# Patient Record
Sex: MALE | Race: WHITE | NOT HISPANIC OR LATINO | Employment: FULL TIME | URBAN - METROPOLITAN AREA
[De-identification: names, ages, dates, MRNs, and addresses within clinical notes are randomized per-mention and may not be internally consistent; named-entity substitution may affect disease eponyms.]

---

## 2017-01-03 ENCOUNTER — PATIENT MESSAGE (OUTPATIENT)
Dept: NEUROLOGY | Facility: CLINIC | Age: 42
End: 2017-01-03

## 2017-01-03 ENCOUNTER — TELEPHONE (OUTPATIENT)
Dept: NEUROLOGY | Facility: CLINIC | Age: 42
End: 2017-01-03

## 2017-01-03 NOTE — TELEPHONE ENCOUNTER
Dory sent the following message:    Dr. Hernandez,     I have recently noticed that Adderall has not been carrying as far as is used to. I was curious to get your feelings on possibly prescribing Vyvanse and increasing the dose of Sildenafil. Also, I believe that the Specialty Pharmacy was supposed to send a new PA for my Copaxone. Has it been received?

## 2017-01-03 NOTE — TELEPHONE ENCOUNTER
Dory sent the following message in response to questions:    Usually in the mid to late evening, not to mention that I've noticed my focus has not been quite as good as it has been. I usually do feel like I'm getting enough rest. I have not tried anything else, in the past, as I usually do not take other medications, besides what Dr. Hernandez has prescribed. I have insurance through [employer] and the specialty pharmacy usually sends my copaxone through the mail.     Thanks,     Eladio

## 2017-01-04 ENCOUNTER — PATIENT MESSAGE (OUTPATIENT)
Dept: NEUROLOGY | Facility: CLINIC | Age: 42
End: 2017-01-04

## 2017-01-04 DIAGNOSIS — N52.9 ERECTILE DYSFUNCTION, UNSPECIFIED ERECTILE DYSFUNCTION TYPE: Primary | ICD-10-CM

## 2017-01-04 RX ORDER — SILDENAFIL 100 MG/1
100 TABLET, FILM COATED ORAL DAILY PRN
Qty: 15 TABLET | Refills: 3 | Status: SHIPPED | OUTPATIENT
Start: 2017-01-04 | End: 2017-09-08 | Stop reason: SDUPTHER

## 2017-01-04 NOTE — TELEPHONE ENCOUNTER
Sent 2nd [employer] message requesting patient update his insurance with [employer] so Copaxone PA can be started.

## 2017-01-10 ENCOUNTER — PATIENT MESSAGE (OUTPATIENT)
Dept: NEUROLOGY | Facility: CLINIC | Age: 42
End: 2017-01-10

## 2017-01-10 ENCOUNTER — TELEPHONE (OUTPATIENT)
Dept: PHARMACY | Facility: CLINIC | Age: 42
End: 2017-01-10

## 2017-01-12 ENCOUNTER — TELEPHONE (OUTPATIENT)
Dept: PHARMACY | Facility: CLINIC | Age: 42
End: 2017-01-12

## 2017-01-13 ENCOUNTER — DOCUMENTATION ONLY (OUTPATIENT)
Dept: NEUROLOGY | Facility: CLINIC | Age: 42
End: 2017-01-13

## 2017-01-16 ENCOUNTER — PATIENT MESSAGE (OUTPATIENT)
Dept: NEUROLOGY | Facility: CLINIC | Age: 42
End: 2017-01-16

## 2017-01-16 ENCOUNTER — DOCUMENTATION ONLY (OUTPATIENT)
Dept: NEUROLOGY | Facility: CLINIC | Age: 42
End: 2017-01-16

## 2017-01-18 ENCOUNTER — TELEPHONE (OUTPATIENT)
Dept: PHARMACY | Facility: CLINIC | Age: 42
End: 2017-01-18

## 2017-01-19 ENCOUNTER — TELEPHONE (OUTPATIENT)
Dept: PHARMACY | Facility: CLINIC | Age: 42
End: 2017-01-19

## 2017-01-19 NOTE — TELEPHONE ENCOUNTER
patient called back in response to voicemail and setup shipment.he verifed he has not started any new medication.he has not developed any new drug allergies or medical conditions. he schedule his ship date for 1-23-17 for a 1-24-17 delivery. he verified his address on file is correct and would like his medication to go there.He did miss one dose about two weeks ago so i transferred the call to a  clinical pharmacist. he verified understanding of the refill process and has no additional questions at the time_ 1-19-17.

## 2017-02-15 ENCOUNTER — TELEPHONE (OUTPATIENT)
Dept: PHARMACY | Facility: CLINIC | Age: 42
End: 2017-02-15

## 2017-03-15 ENCOUNTER — TELEPHONE (OUTPATIENT)
Dept: PHARMACY | Facility: CLINIC | Age: 42
End: 2017-03-15

## 2017-03-16 DIAGNOSIS — G35 MULTIPLE SCLEROSIS: Primary | ICD-10-CM

## 2017-03-16 RX ORDER — GLATIRAMER 40 MG/ML
INJECTION, SOLUTION SUBCUTANEOUS
Qty: 36 ML | Refills: 1 | Status: SHIPPED | OUTPATIENT
Start: 2017-03-16 | End: 2017-06-27 | Stop reason: SDUPTHER

## 2017-03-17 ENCOUNTER — TELEPHONE (OUTPATIENT)
Dept: PHARMACY | Facility: CLINIC | Age: 42
End: 2017-03-17

## 2017-03-23 ENCOUNTER — OFFICE VISIT (OUTPATIENT)
Dept: NEUROLOGY | Facility: CLINIC | Age: 42
End: 2017-03-23
Payer: COMMERCIAL

## 2017-03-23 ENCOUNTER — DOCUMENTATION ONLY (OUTPATIENT)
Dept: NEUROLOGY | Facility: CLINIC | Age: 42
End: 2017-03-23

## 2017-03-23 VITALS
HEIGHT: 70 IN | HEART RATE: 84 BPM | BODY MASS INDEX: 35.65 KG/M2 | DIASTOLIC BLOOD PRESSURE: 85 MMHG | WEIGHT: 249 LBS | SYSTOLIC BLOOD PRESSURE: 134 MMHG

## 2017-03-23 DIAGNOSIS — Z71.89 COUNSELING REGARDING GOALS OF CARE: ICD-10-CM

## 2017-03-23 DIAGNOSIS — R53.83 FATIGUE, UNSPECIFIED TYPE: ICD-10-CM

## 2017-03-23 DIAGNOSIS — G35 MULTIPLE SCLEROSIS: Primary | ICD-10-CM

## 2017-03-23 DIAGNOSIS — F32.A DEPRESSION, UNSPECIFIED DEPRESSION TYPE: ICD-10-CM

## 2017-03-23 DIAGNOSIS — Z29.89 PROPHYLACTIC IMMUNOTHERAPY: ICD-10-CM

## 2017-03-23 DIAGNOSIS — R41.840 POOR CONCENTRATION: ICD-10-CM

## 2017-03-23 PROCEDURE — 99215 OFFICE O/P EST HI 40 MIN: CPT | Mod: S$GLB,,, | Performed by: CLINICAL NURSE SPECIALIST

## 2017-03-23 PROCEDURE — 99999 PR PBB SHADOW E&M-EST. PATIENT-LVL III: CPT | Mod: PBBFAC,,, | Performed by: CLINICAL NURSE SPECIALIST

## 2017-03-23 RX ORDER — MODAFINIL 100 MG/1
TABLET ORAL
Qty: 60 TABLET | Refills: 5 | Status: SHIPPED | OUTPATIENT
Start: 2017-03-23 | End: 2019-03-27

## 2017-03-23 NOTE — Clinical Note
Xenia, I am referring Dory to you for counseling. He lost his father in June and is having a really hard time dealing with everything. His wife has encouraged him to get some help, and he is open to it. 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

## 2017-03-23 NOTE — PROGRESS NOTES
"Subjective:       Patient ID: Dory Raza is a 41 y.o. male who presents today for a routine clinic visit for MS. He was last seen in September 2016. The history has been provided by the patient.     MS HPI:  · DMT: Copaxone 40mg three times a week  · Side effects from DMT? No  · Taking vitamin D3 as recommended? Yes -  Dose: 6000 units daily  · He denies any new clinical symptoms.   · He has had some life stressors with his father passing in June 2016.   · He has L'hermitte's sign some days. When he has it, it is more prominent in the morning and tapers off throughout the day.     SOCIAL HISTORY  Social History   Substance Use Topics    Smoking status: Former Smoker     Types: Cigarettes     Quit date: 2/18/2010    Smokeless tobacco: Never Used    Alcohol use 0.0 oz/week     0 Standard drinks or equivalent per week      Comment: socially     Living arrangements - the patient lives with his wife and 3 daughters, ages 16, 11, and 10.   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     MS ROS:  · Fatigue: Yes - He has tried Co-Q-10. He still feels like his focus is poor. He gets tired around 8pm at night.   · Sleep Disturbance: No  · Bladder Dysfunction: No  · Bowel Dysfunction: No  · Spasticity: No  · Visual Symptoms: Yes - He has a floater in his field of vision. It is small, crescent-shaped. He only sees it when he looks up and to the left. He sometimes has some focus issues when he blinks.   · Cognitive: Yes - He has to keep to do lists and alarms on his phone for reminders. He forgot his daughter at the bus stop recently.   · Mood Disorder: Yes - He has had a tough time handling the death of his father.   · Gait Disturbance: No. Very infrequently, he has to "check his steps."   · Falls: No  · Hand Dysfunction: No  · Pain: No  · Sexual Dysfunction: Viagra is helpful.   · Skin Breakdown: No  · Tremors: No  · Dysphagia:  No  · Dysarthria:  No  · Heat sensitivity:  Yes - He notices more sensitivity to " hot water when he runs his hands under water.   · Any un-met adaptive needs? No  · Copay Assist?  Yes - $0 for Copaxone        Objective:        1. 25 foot timed walk: 3.09 seconds today without assist     Neurologic Exam     Mental Status   Oriented to person, place, and time.   Attention: normal. Concentration: normal.   Speech: speech is normal   Level of consciousness: alert  Knowledge: good.   Normal comprehension.     Cranial Nerves     CN III, IV, VI   Pupils are equal, round, and reactive to light.  Extraocular motions are normal.   Right pupil: Shape: regular. Reactivity: brisk.   Left pupil: Shape: regular. Reactivity: brisk.   CN III: no CN III palsy  CN VI: no CN VI palsy  Nystagmus: none     CN V   Right facial sensation deficit: none  Left facial sensation deficit: none    CN VII   Right facial weakness: none  Left facial weakness: none    CN VIII   Hearing: intact    CN IX, X   Palate: symmetric    CN XI   Right sternocleidomastoid strength: normal  Left sternocleidomastoid strength: normal  Right trapezius strength: normal  Left trapezius strength: normal    CN XII   Tongue deviation: none    Motor Exam   Muscle bulk: normal  Overall muscle tone: normal  Right arm tone: normal  Left arm tone: normal  Right arm pronator drift: absent  Left arm pronator drift: absent  Right leg tone: normal  Left leg tone: normal    Strength   Right neck flexion: 5/5  Left neck flexion: 5/5  Right neck extension: 5/5  Left neck extension: 5/5  Right deltoid: 5/5  Left deltoid: 5/5  Right biceps: 5/5  Left biceps: 5/5  Right triceps: 5/5  Left triceps: 5/5  Right wrist flexion: 5/5  Left wrist flexion: 5/5  Right wrist extension: 5/5  Left wrist extension: 5/5  Right interossei: 5/5  Left interossei: 5/5  Right iliopsoas: 5/5  Left iliopsoas: 5/5  Right quadriceps: 5/5  Left quadriceps: 5/5  Right hamstrin/5  Left hamstrin/5  Right anterior tibial: 5/5  Left anterior tibial: 5/5  Right gastroc: 5/5  Left  gastroc: 5/5    Sensory Exam   Right arm vibration: normal  Left arm vibration: normal  Right leg vibration: normal  Left leg vibration: normal    Gait, Coordination, and Reflexes     Gait  Gait: normal    Coordination   Romberg: negative  Finger to nose coordination: normal  Tandem walking coordination: normal    Tremor   Resting tremor: absent    Reflexes   Right brachioradialis: 2+  Left brachioradialis: 2+  Right biceps: 2+  Left biceps: 2+  Right patellar: 2+  Left patellar: 2+  Right achilles: 2+  Left achilles: 2+  Right plantar: normal  Left plantar: normal            Labs:     Lab Results   Component Value Date    [id number] 60 03/28/2016    [id number] 70 09/25/2015         Diagnosis/Assessment/Plan:    1. Multiple Sclerosis  · Assessment: Dory is clinically stable today on Copaxone. He is struggling with impaired focus, and he is experiencing depression since the loss of his father. I suspect that some of the focus issues are related to mood. He is open to counseling.   · Imaging: MRI brain in September 2017.   · Disease Modifying Therapies: Continue Copaxone. Patient is concerned that Copaxone may be causing weight gain, and we have seen this with other patients. We briefly discussed other DMT options, including interferons. He would like to focus on diet and exercise for now, and we will revisit again at 6 month follow-up. Continue Vitamin D. Will check level today and make recommendation on dose if needed.     2. MS Symptom Assessment / Management  · Fatigue/Cognition: Will start Provigil 100mg in the morning at 100mg at noon if needed to increase focus and concentration.   · Visual Symptoms: He has floater, which is not new.   · Mood Disorder: I have referred the patient for counseling with Xenia Miranda LCSW.   · Sexual Dysfunction: Continue Viagra.     Over 50% of this 40 minute visit was spent in direct face to face counseling of the patient about MS and the management of his symptoms. The  patient agrees with the plan of care. He will follow up with Dr. Hernandez in 6 months after brain MRI.         There are no diagnoses linked to this encounter.

## 2017-03-24 PROBLEM — G35 MULTIPLE SCLEROSIS: Status: ACTIVE | Noted: 2017-03-24

## 2017-03-24 RX ORDER — GLATIRAMER ACETATE 40 MG/ML
INJECTION, SOLUTION SUBCUTANEOUS
Qty: 36 ML | Refills: 0 | Status: SHIPPED | OUTPATIENT
Start: 2017-03-24 | End: 2017-11-09 | Stop reason: SDUPTHER

## 2017-04-04 ENCOUNTER — TELEPHONE (OUTPATIENT)
Dept: NEUROLOGY | Facility: CLINIC | Age: 42
End: 2017-04-04

## 2017-04-04 NOTE — TELEPHONE ENCOUNTER
Pt phoned back and was approved for 5 EAP sessions by Abhijit.  Certification # [id number].  I am looking for guidance on billing/documenting, etc and will then follow up with pt to schedule.

## 2017-04-04 NOTE — TELEPHONE ENCOUNTER
----- Message from RUSSEL Moreau, CNS sent at 3/24/2017  1:31 PM CDT -----  Xenia, I am referring Dory to you for counseling. He lost his father in June and is having a really hard time dealing with everything. His wife has encouraged him to get some help, and he is open to it. 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

## 2017-04-04 NOTE — TELEPHONE ENCOUNTER
Spoke with pt by phone regarding referral for counseling.  He is still interested in services.  I explained the [employer] Assistance [employer] benefit to pt, in the event he would like to take advantage of the 5 free counseling sessions provided by the plan.  Explained that I may not be covered under that plan, and so he's free to work with another therapist or call me back to schedule through his health insurance benefit.  Pt was appreciative of the information and will call [employer] regarding the EAP benefit, then follow up with this writer.

## 2017-04-10 ENCOUNTER — TELEPHONE (OUTPATIENT)
Dept: PHARMACY | Facility: CLINIC | Age: 42
End: 2017-04-10

## 2017-04-10 NOTE — TELEPHONE ENCOUNTER
Followed up with patient regarding EAP benefit and advised that he will need to see a therapist within Outpatient Psychiatry.  Pt will consider this option vs seeing this writer through his insurance.

## 2017-05-11 ENCOUNTER — TELEPHONE (OUTPATIENT)
Dept: PHARMACY | Facility: CLINIC | Age: 42
End: 2017-05-11

## 2017-06-06 ENCOUNTER — TELEPHONE (OUTPATIENT)
Dept: PHARMACY | Facility: CLINIC | Age: 42
End: 2017-06-06

## 2017-06-06 NOTE — TELEPHONE ENCOUNTER
Spoke to pt for Copaxone refill. Verified 2 patient identifiers. Reviewed general adherence. Patient has 5 injections remaining and injects every MWF. No missed doses. No new medications, allergies, health conditions or side effects reported at this time. Address confirmed. Will ship 6/12 for 6/13 delivery via [employer]. $0 Copay. Patient voiced understanding.    Mirian Hicks, Pharm.D  Specialty Pharmacy Clinical Pharmacist  [facility]  Phone: [phone]

## 2017-06-27 ENCOUNTER — OFFICE VISIT (OUTPATIENT)
Dept: OPTOMETRY | Facility: CLINIC | Age: 42
End: 2017-06-27
Payer: COMMERCIAL

## 2017-06-27 DIAGNOSIS — H10.13 ACUTE ATOPIC CONJUNCTIVITIS OF BOTH EYES: Primary | ICD-10-CM

## 2017-06-27 PROCEDURE — 92002 INTRM OPH EXAM NEW PATIENT: CPT | Mod: S$GLB,,, | Performed by: OPTOMETRIST

## 2017-06-27 PROCEDURE — 99999 PR PBB SHADOW E&M-EST. PATIENT-LVL II: CPT | Mod: PBBFAC,,, | Performed by: OPTOMETRIST

## 2017-06-27 RX ORDER — NEOMYCIN SULFATE, POLYMYXIN B SULFATE AND DEXAMETHASONE 3.5; 10000; 1 MG/ML; [USP'U]/ML; MG/ML
SUSPENSION/ DROPS OPHTHALMIC
Qty: 5 ML | Refills: 0 | Status: SHIPPED | OUTPATIENT
Start: 2017-06-27 | End: 2017-07-04

## 2017-06-27 NOTE — PROGRESS NOTES
HPI     Eye Problem    Additional comments: OS>OD itching , red,  edema chemosis FBS x Monday//           Comments   OS>OD itching , red,  edema chemosis , pt has pic, crusty, edema, FBS x   Monday//  FBS Mon does not feel now did have pain with that// no pain   now//    Agree above  Itching with fbs and conj chemosis yesterday during the day  Today still w/ redness in conj, OS >OD  No fever / sore throat, no illness, no exposure         Last edited by MIGUEL ÁNGEL Melo, OD on 6/27/2017 10:01 AM. (History)        ROS     Positive for: Eyes    Negative for: Constitutional, Gastrointestinal, Neurological, Skin,   Genitourinary, Musculoskeletal, HENT, Endocrine, Cardiovascular,   Respiratory, Psychiatric, Allergic/Imm, Heme/Lymph    Last edited by MIGUEL ÁNGEL Melo, OD on 6/27/2017 10:01 AM. (History)        Assessment /Plan     For exam results, see Encounter Report.    Acute atopic conjunctivitis of both eyes  -     neomycin-polymyxin-dexamethasone (MAXITROL) 3.5mg/mL-10,000 unit/mL-0.1 % DrpS; I gtt OU qid x 4 days, then bid x 3 days  Dispense: 5 mL; Refill: 0      ? Etiology allergic vs acute viral conj OS>OD  Rx maxitrol as directed  Add ATs tid+ for comfort  Call 48 hours if not improved or worsening, prn  Hand washing / hygiene discussed

## 2017-07-03 ENCOUNTER — TELEPHONE (OUTPATIENT)
Dept: PHARMACY | Facility: CLINIC | Age: 42
End: 2017-07-03

## 2017-07-31 ENCOUNTER — TELEPHONE (OUTPATIENT)
Dept: PHARMACY | Facility: CLINIC | Age: 42
End: 2017-07-31

## 2017-08-25 ENCOUNTER — TELEPHONE (OUTPATIENT)
Dept: PHARMACY | Facility: CLINIC | Age: 42
End: 2017-08-25

## 2017-09-01 ENCOUNTER — HOSPITAL ENCOUNTER (OUTPATIENT)
Dept: RADIOLOGY | Facility: HOSPITAL | Age: 42
Discharge: HOME | End: 2017-09-01
Attending: CLINICAL NURSE SPECIALIST
Payer: COMMERCIAL

## 2017-09-01 DIAGNOSIS — G35 MULTIPLE SCLEROSIS: ICD-10-CM

## 2017-09-01 PROCEDURE — A9585 GADOBUTROL INJECTION: HCPCS | Mod: PO | Performed by: CLINICAL NURSE SPECIALIST

## 2017-09-01 PROCEDURE — 70553 MRI BRAIN STEM W/O & W/DYE: CPT | Mod: TC,PO

## 2017-09-01 PROCEDURE — 70553 MRI BRAIN STEM W/O & W/DYE: CPT | Mod: 26,,, | Performed by: RADIOLOGY

## 2017-09-01 PROCEDURE — 25500020 PHARM REV CODE 255: Mod: PO | Performed by: CLINICAL NURSE SPECIALIST

## 2017-09-01 RX ORDER — GADOBUTROL 604.72 MG/ML
10 INJECTION INTRAVENOUS
Status: COMPLETED | OUTPATIENT
Start: 2017-09-01 | End: 2017-09-01

## 2017-09-01 RX ADMIN — GADOBUTROL 10 ML: 604.72 INJECTION INTRAVENOUS at 09:09

## 2017-09-08 ENCOUNTER — OFFICE VISIT (OUTPATIENT)
Dept: NEUROLOGY | Facility: CLINIC | Age: 42
End: 2017-09-08
Payer: COMMERCIAL

## 2017-09-08 VITALS
BODY MASS INDEX: 36.08 KG/M2 | WEIGHT: 252 LBS | HEIGHT: 70 IN | SYSTOLIC BLOOD PRESSURE: 132 MMHG | HEART RATE: 80 BPM | DIASTOLIC BLOOD PRESSURE: 83 MMHG

## 2017-09-08 DIAGNOSIS — G47.33 OSA (OBSTRUCTIVE SLEEP APNEA): Primary | ICD-10-CM

## 2017-09-08 DIAGNOSIS — Z29.89 PROPHYLACTIC IMMUNOTHERAPY: ICD-10-CM

## 2017-09-08 DIAGNOSIS — R90.89 ABNORMAL FINDING ON MRI OF BRAIN: ICD-10-CM

## 2017-09-08 DIAGNOSIS — Z71.89 COUNSELING REGARDING GOALS OF CARE: ICD-10-CM

## 2017-09-08 DIAGNOSIS — N52.9 ERECTILE DYSFUNCTION, UNSPECIFIED ERECTILE DYSFUNCTION TYPE: ICD-10-CM

## 2017-09-08 DIAGNOSIS — G35 MS (MULTIPLE SCLEROSIS): ICD-10-CM

## 2017-09-08 PROCEDURE — 99999 PR PBB SHADOW E&M-EST. PATIENT-LVL II: CPT | Mod: PBBFAC,,, | Performed by: PSYCHIATRY & NEUROLOGY

## 2017-09-08 PROCEDURE — 99215 OFFICE O/P EST HI 40 MIN: CPT | Mod: S$GLB,,, | Performed by: PSYCHIATRY & NEUROLOGY

## 2017-09-08 PROCEDURE — 3008F BODY MASS INDEX DOCD: CPT | Mod: S$GLB,,, | Performed by: PSYCHIATRY & NEUROLOGY

## 2017-09-08 RX ORDER — SILDENAFIL 100 MG/1
100 TABLET, FILM COATED ORAL DAILY PRN
Qty: 15 TABLET | Refills: 3 | Status: SHIPPED | OUTPATIENT
Start: 2017-09-08 | End: 2018-09-17 | Stop reason: SDUPTHER

## 2017-09-08 NOTE — Clinical Note
Kindly give pt a call; he's decided not to do EAP program; ready for counseling with you; would be great for next newly diagnosed group as well; thanks

## 2017-09-08 NOTE — PROGRESS NOTES
"Subjective:       Patient ID: Dory Raza is a 42 y.o. male who presents today for a routine clinic visit for MS.    MS HPI:  · DMT: Copaxone  · Side effects from DMT? No  · Taking vitamin D3 as recommended? Yes -  Dose: 6,000 IU daily  · Overall stable;   · Pt states that his wife expresses concern about his snoring; she also has concern about swallowing issues, but the patient does not feel concerned about this.  Pt states is wife says that pt is "edgy".       SOCIAL HISTORY  Social History   Substance Use Topics    Smoking status: Former Smoker     Types: Cigarettes     Quit date: 2/18/2010    Smokeless tobacco: Never Used    Alcohol use 0.0 oz/week      Comment: socially     Living arrangements - the patient live with his wife and 3 daughters.  Employment  [employer] Nurse    MS ROS:  · Fatigue: Yes - he has been very busy lately;  Has intermittent fatigue; takes Modafinil 100mg BID, and does not feel it does much;   · Sleep Disturbance: Yes - lots of snoring lately;  Admits to weight gain;   · Bladder Dysfunction: No  · Bowel Dysfunction: No  · Spasticity: No  · Visual Symptoms: No  · Cognitive: No  · Mood Disorder: Yes - he plans to see Xenia Miranda LCSW; defers any medication for mood; feels he is just overly stressed;   · Gait Disturbance: No  · Falls: No  · Hand Dysfunction: No  · Pain: No  · Sexual Dysfunction: Needs refill on Viagra  · Skin Breakdown: No  · Tremors: No  · Dysphagia:  Yes - rare  · Dysarthria:  No    Objective:    25 foot timed walk: 3.1 seconds without assist;   Neurologic Exam      MENTAL STATUS: grossly intact  CRANIAL NERVE EXAM: There is no internuclear ophthalmoplegia. Extraocular   muscles are intact.  No facial   asymmetry.There is no dysarthria.   MOTOR EXAM: Normal bulk and tone throughout UE and LE bilaterally. Rapid sequential movements are normal. Strength is 5/5 in all groups   in the lower extremities and upper extremities.   REFLEXES: Symmetric and 2+ throughout " in all 4 extremities.   SENSORY EXAM: Normal to vibration t/o  COORDINATION: Normal finger-to-nose exam.   GAIT: Narrow based and stable.      Imaging:   MRI Brain Sept 2017 stable; no new or active lesions    Labs:     Lab Results   Component Value Date    [id number] 51 03/23/2017    [id number] 60 03/28/2016    [id number] 70 09/25/2015       Diagnosis/Assessment/Plan:    1. Multiple Sclerosis  · Assessment: Pt is clinically and radiographically stable on Copaxone  · Imaging: MRI brain planned Sept 2018  · Disease Modifying Therapies:  Continue Copaxone and high dose D3    2. MS Symptom Assessment / Management  · Fatigue: continue Provigil  · Sleep Disturbance: sleep study; advised weight loss; continue Provigil;   · Sexual dysfunction: Viagra refilled  · Mood: he will see Xenia ZAMBRANOW for counseling  · No other changes to regimen described in ROS above    Over 50% of this 40 minute visit was spent in direct face to face counseling of the patient about his MS and managmement of his various sx    F/u 6 mo AP        GRICELDA (obstructive sleep apnea)  -     Polysomnogram (CPAP will be added if patient meets diagnostic criteria.); Future    Erectile dysfunction, unspecified erectile dysfunction type  -     sildenafil (VIAGRA) 100 MG tablet; Take 1 tablet (100 mg total) by mouth daily as needed for Erectile Dysfunction.  Dispense: 15 tablet; Refill: 3

## 2017-09-12 ENCOUNTER — DOCUMENTATION ONLY (OUTPATIENT)
Dept: NEUROLOGY | Facility: CLINIC | Age: 42
End: 2017-09-12

## 2017-09-14 ENCOUNTER — TELEPHONE (OUTPATIENT)
Dept: SLEEP MEDICINE | Facility: OTHER | Age: 42
End: 2017-09-14

## 2017-09-19 ENCOUNTER — TELEPHONE (OUTPATIENT)
Dept: PHARMACY | Facility: CLINIC | Age: 42
End: 2017-09-19

## 2017-10-04 ENCOUNTER — TELEPHONE (OUTPATIENT)
Dept: NEUROLOGY | Facility: CLINIC | Age: 42
End: 2017-10-04

## 2017-10-04 NOTE — TELEPHONE ENCOUNTER
Phoned pt to follow up on counseling referral and support group.  Left voicemail and sent portal message.

## 2017-10-09 ENCOUNTER — TELEPHONE (OUTPATIENT)
Dept: NEUROLOGY | Facility: CLINIC | Age: 42
End: 2017-10-09

## 2017-10-09 ENCOUNTER — PATIENT MESSAGE (OUTPATIENT)
Dept: NEUROLOGY | Facility: CLINIC | Age: 42
End: 2017-10-09

## 2017-10-09 NOTE — TELEPHONE ENCOUNTER
Followed up with pt and scheduled for consult on 10/11/17.  Discussed the newly diagnosed support group, too and added him to the wait list.

## 2017-10-11 ENCOUNTER — INITIAL CONSULT (OUTPATIENT)
Dept: NEUROLOGY | Facility: CLINIC | Age: 42
End: 2017-10-11
Payer: COMMERCIAL

## 2017-10-11 DIAGNOSIS — F43.23 PERSISTENT ADJUSTMENT DISORDER WITH MIXED ANXIETY AND DEPRESSED MOOD: Primary | ICD-10-CM

## 2017-10-11 PROCEDURE — 90791 PSYCH DIAGNOSTIC EVALUATION: CPT | Mod: S$GLB,,, | Performed by: SOCIAL WORKER
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
## 2017-10-13 ENCOUNTER — TELEPHONE (OUTPATIENT)
Dept: PHARMACY | Facility: CLINIC | Age: 42
End: 2017-10-13

## 2017-10-16 ENCOUNTER — HOSPITAL ENCOUNTER (OUTPATIENT)
Dept: SLEEP MEDICINE | Facility: OTHER | Age: 42
Discharge: HOME | End: 2017-10-16
Attending: PSYCHIATRY & NEUROLOGY
Payer: COMMERCIAL

## 2017-10-16 DIAGNOSIS — G47.33 OSA (OBSTRUCTIVE SLEEP APNEA): ICD-10-CM

## 2017-10-16 DIAGNOSIS — R06.81 CENTRAL APNEA: ICD-10-CM

## 2017-10-16 PROCEDURE — 95811 POLYSOM 6/>YRS CPAP 4/> PARM: CPT

## 2017-10-16 PROCEDURE — 95811 POLYSOM 6/>YRS CPAP 4/> PARM: CPT | Mod: 26,,, | Performed by: PSYCHIATRY & NEUROLOGY

## 2017-10-17 ENCOUNTER — OFFICE VISIT (OUTPATIENT)
Dept: NEUROLOGY | Facility: CLINIC | Age: 42
End: 2017-10-17
Payer: COMMERCIAL

## 2017-10-17 DIAGNOSIS — F43.23 PERSISTENT ADJUSTMENT DISORDER WITH MIXED ANXIETY AND DEPRESSED MOOD: Primary | ICD-10-CM

## 2017-10-17 PROCEDURE — 90837 PSYTX W PT 60 MINUTES: CPT | Mod: S$GLB,,, | Performed by: SOCIAL WORKER

## 2017-10-17 NOTE — PROGRESS NOTES
"Individual Psychotherapy (PhD/LCSW)    10/17/2017    Site:  [facility]         Therapeutic Intervention: Met with patient.  Outpatient - Insight oriented psychotherapy 60 min - CPT code 68534 and Outpatient - Supportive psychotherapy 60 min - CPT Code 21274    Chief complaint/reason for encounter: depression, interpersonal and sexual problems     Interval history and content of current session: Eladio reports he's been doing fairly well since we last met.  Completed sleep study last night and commented, "they should call it an awake study".  He's waiting for his results.  Shared that he was able to spend some time with his wife over the past week and did speak with her about couples counseling but she "wants me to focus on me right now".  Eladio shared his goal for our sessions is to "cope with this (MS) better" and communicate more effectively with his wife.  He also wants to work harder at making time for himself (fishing, exercise, etc) and his children.  He repeatedly shares that he doesn't want to regret not making the most of this time in the event that he does suffer disability in the future.    Eladio abruptly became tearful when speaking about his father's death, so we spent some time exploring his grief then returned to his goals.  Eladio identifies many barriers to achieving his goals, especially those that involve better self-care.  He shares he doesn't have time, but when we concretely explores his weekly schedule he admits that he could incorporate exercise or outings.  It seems the greater barrier for him is twofold: his sense of guilt as "I should be taking care of others first" and his concern that his wife will be angry with him even though she has encouraged his self-care.  Eladio describes a long history of feeling guilty and experiencing low self-worth though he's not sure from where this originated.      Treatment plan:  · Target symptoms: depression, adjustment, grief  · Why chosen therapy is " appropriate versus another modality: relevant to diagnosis  · Outcome monitoring methods: self-report, feedback from family, checklist/rating scale  · Therapeutic intervention type: insight oriented psychotherapy, supportive psychotherapy    Risk parameters:  Patient reports no suicidal ideation  Patient reports no homicidal ideation  Patient reports no self-injurious behavior  Patient reports no violent behavior    Verbal deficits: None    Patient's response to intervention:  The patient's response to intervention is accepting.    Progress toward goals and other mental status changes:  The patient's progress toward goals is limited.    Diagnosis:     ICD-10-CM ICD-9-CM   1. Persistent adjustment disorder with mixed anxiety and depressed mood F43.23 309.28   Rule out Major Depressive Disorder vs Persistent Depressive Disorder     Plan:  individual psychotherapy and consult psychiatrist for medication evaluation   Eladio will explore gym membership as a first step toward resuming exercise routine.    Return to clinic: 1 week    Length of Service (minutes): 60

## 2017-10-17 NOTE — PROGRESS NOTES
"All night sleep study was performed on Dory Raza on 10/16/2017.  Procedure and CPAP was explained prior to start of study; questions and concerns addressed.    EKG appeared to be NSR.  Low sat 77%.    Patient did meet criteria for PAP treatment.  Before CPAP, some snoring noted and most significant respiratory events observed during REM.  During Treatment portion of study, heated humidity, C-Flex and chin strap was used with medium Eson nasal mask.  CPAP 4 - 14 cmH2O was explored; patient appeared to tolerate mask and pressures well.  No c/o discomfort nor difficulty from patient during study.    Soon after starting PAP, pulse ox signal was lost.  The pulse ox probe was changed; the pulse ox cord and black connector on patient sensor box as well as the sensor box was changed; switched the headbox for different one; study stopped and resumed; and the computer was restarted in attempt to remedy the problem.  No success in regaining pulse ox signal so patient was placed in a different room to continue study.    Questional optimal pressure due to continued arousals and ? mouth leaks, but side REM observed on CPAP 14; supine REM not observed.  Patient reaction to PAP - "I don't like it. Its uncomfortable."    Reminder sheet was given to patient at the end of study.  "

## 2017-10-25 ENCOUNTER — OFFICE VISIT (OUTPATIENT)
Dept: NEUROLOGY | Facility: CLINIC | Age: 42
End: 2017-10-25
Payer: COMMERCIAL

## 2017-10-25 DIAGNOSIS — F43.23 PERSISTENT ADJUSTMENT DISORDER WITH MIXED ANXIETY AND DEPRESSED MOOD: Primary | ICD-10-CM

## 2017-10-25 PROCEDURE — 90837 PSYTX W PT 60 MINUTES: CPT | Mod: S$GLB,,, | Performed by: SOCIAL WORKER

## 2017-10-25 NOTE — PROGRESS NOTES
"Individual Psychotherapy (PhD/LCSW)    10/25/2017    Site:  [facility]         Therapeutic Intervention: Met with patient.  Outpatient - Insight oriented psychotherapy 60 min - CPT code 06963 and Outpatient - Supportive psychotherapy 60 min - CPT Code 73332    Chief complaint/reason for encounter: depression, interpersonal and sexual problems     Interval history and content of current session:   Eladio reports that he and his wife did complete homework of looking into gyms over the past week.  They have a few more to visit and will then make a decision.  Eladio felt a sense of accomplishment at following through with this task.  Eladio continues to report a sense of guilt when he considers "putting myself first" to engage in exercise or hobbies.  Suggested that using the language of putting oneself first may be a bit too polarizing as it suggests, to Eladio, that he is abandoning his duties as a father or .  Instead, we discussed adding exercise and hobbies as activities that would not only boost his health/well-being but would also 1) leave him feeling less stressed and more available to his family and 2) allow him to model healthy behaviors to his children.  He acknowledged that this might be a healthier way for him to think about these activities and he will continue to pursue this goal.  We focused additional session time on communication challenges that he and his wife experience.  Specifically, per Eladios' perspective, his accepting blame in order to avoid conflict and his wife's habit of holding grudges.  Provided eduction on active listening and will continue to explore these dynamics in future sessions.      Treatment plan:  · Target symptoms: depression, adjustment, grief  · Why chosen therapy is appropriate versus another modality: relevant to diagnosis  · Outcome monitoring methods: self-report, feedback from family, checklist/rating scale  · Therapeutic intervention type: insight oriented " psychotherapy, supportive psychotherapy    Risk parameters:  Patient reports no suicidal ideation  Patient reports no homicidal ideation  Patient reports no self-injurious behavior  Patient reports no violent behavior    Verbal deficits: None    Patient's response to intervention:  The patient's response to intervention is accepting.    Progress toward goals and other mental status changes:  The patient's progress toward goals is limited.    Diagnosis:     ICD-10-CM ICD-9-CM   1. Persistent adjustment disorder with mixed anxiety and depressed mood F43.23 309.28   Rule out Major Depressive Disorder vs Persistent Depressive Disorder     Plan:  individual psychotherapy and consult psychiatrist for medication evaluation   Eladio will choose a gym to enroll in, so that he can resume is exercise routine.      Return to clinic: 1 week    Length of Service (minutes): 60

## 2017-10-31 ENCOUNTER — PATIENT MESSAGE (OUTPATIENT)
Dept: NEUROLOGY | Facility: CLINIC | Age: 42
End: 2017-10-31

## 2017-11-09 ENCOUNTER — TELEPHONE (OUTPATIENT)
Dept: PHARMACY | Facility: CLINIC | Age: 42
End: 2017-11-09

## 2017-11-09 ENCOUNTER — OFFICE VISIT (OUTPATIENT)
Dept: NEUROLOGY | Facility: CLINIC | Age: 42
End: 2017-11-09
Payer: COMMERCIAL

## 2017-11-09 DIAGNOSIS — F43.23 PERSISTENT ADJUSTMENT DISORDER WITH MIXED ANXIETY AND DEPRESSED MOOD: Primary | ICD-10-CM

## 2017-11-09 PROCEDURE — 90837 PSYTX W PT 60 MINUTES: CPT | Mod: S$GLB,,, | Performed by: SOCIAL WORKER

## 2017-11-09 NOTE — PROGRESS NOTES
"Individual Psychotherapy (PhD/LCSW)    11/9/2017    Site:  [facility]         Therapeutic Intervention: Met with patient.  Outpatient - Insight oriented psychotherapy 60 min - CPT code 24712 and Outpatient - Supportive psychotherapy 60 min - CPT Code 67456    Chief complaint/reason for encounter: depression, anxiety, interpersonal and sexual problems     Interval history and content of current session:   Eladio presented to session reporting things have been "good".  Describes relationship with wife as "going well" right now and states "when it's good it's really good".  However, with further exploration of Eladio revealed distressing worry and guilt around his MS diagnosis and the perceived burden he has placed/will place on his family.  Eladio admits that MS does not greatly impact his life, presently, but he seems convinced that it will disable him in the future, so he worries about his family's financial stability and how they will feel about caring for him.  He also has distorted thoughts about his children's health, sharing as an example that his middle child has low vitamin D and his initial reaction was one of guilt, fear and sadness because "vitamin D plays a role in MS and if she get's MS it's because of me".  Similarly, Eladio expresses guilt over the purchase of his current home (done after his MS diagnosis) because his family may not be able to afford the home if he becomes disabled.  He was tearful during this discussion.    Provided education on the interaction between beliefs/thoughts, emotions and behaviors.  Introduced Eladio to worksheet on cognitive distortions and a thought record.  Explored some of Eladio's frequent cognitive distortions (personalization, catastrophizing, jumping to conclusions, disqualifying the positive) and used the thought record to examine the incident that occurred when he learned of his daughter's low vitamin D level.  Eladio expressed understanding of the concepts and will " continue to use the chart until our next session.      Treatment plan:  · Target symptoms: depression, anxiety , adjustment  · Why chosen therapy is appropriate versus another modality: relevant to diagnosis  · Outcome monitoring methods: self-report, feedback from family, checklist/rating scale  · Therapeutic intervention type: insight oriented psychotherapy, supportive psychotherapy, cognitive therapy    Risk parameters:  Patient reports no suicidal ideation  Patient reports no homicidal ideation  Patient reports no self-injurious behavior  Patient reports no violent behavior    Verbal deficits: None    Patient's response to intervention:  The patient's response to intervention is accepting.    Progress toward goals and other mental status changes:  The patient's progress toward goals is fair .    Diagnosis:     ICD-10-CM ICD-9-CM   1. Persistent adjustment disorder with mixed anxiety and depressed mood F43.23 309.28     Plan:  individual psychotherapy and consult psychiatrist for medication evaluation (considering referral)  Eladio will choose a gym to enroll in, so that he can resume his exercise routine.    Eladio will use thought record to track/examine cognitive distortions.      Return to clinic: 1 week    Length of Service (minutes): 60

## 2017-11-10 RX ORDER — GLATIRAMER ACETATE 40 MG/ML
INJECTION, SOLUTION SUBCUTANEOUS
Qty: 36 ML | Refills: 0 | Status: SHIPPED | OUTPATIENT
Start: 2017-11-10 | End: 2018-01-30 | Stop reason: SDUPTHER

## 2017-11-15 ENCOUNTER — PATIENT MESSAGE (OUTPATIENT)
Dept: NEUROLOGY | Facility: CLINIC | Age: 42
End: 2017-11-15

## 2017-11-20 ENCOUNTER — PATIENT MESSAGE (OUTPATIENT)
Dept: NEUROLOGY | Facility: CLINIC | Age: 42
End: 2017-11-20

## 2017-11-20 DIAGNOSIS — G47.33 OSA (OBSTRUCTIVE SLEEP APNEA): Primary | ICD-10-CM

## 2017-11-21 NOTE — TELEPHONE ENCOUNTER
Spoke to patient and informed him that sleep study shows GRICELDA.   Pt states that he questions validity of the study due to lots of technical difficulties.  He's reluctant to go for CPAP titration, but open to referral to sleep medicine clinic.  Will order/schedule.

## 2017-11-30 ENCOUNTER — PATIENT MESSAGE (OUTPATIENT)
Dept: NEUROLOGY | Facility: CLINIC | Age: 42
End: 2017-11-30

## 2017-12-05 ENCOUNTER — TELEPHONE (OUTPATIENT)
Dept: PHARMACY | Facility: CLINIC | Age: 42
End: 2017-12-05

## 2017-12-06 ENCOUNTER — OFFICE VISIT (OUTPATIENT)
Dept: NEUROLOGY | Facility: CLINIC | Age: 42
End: 2017-12-06
Payer: COMMERCIAL

## 2017-12-06 DIAGNOSIS — F43.23 PERSISTENT ADJUSTMENT DISORDER WITH MIXED ANXIETY AND DEPRESSED MOOD: Primary | ICD-10-CM

## 2017-12-06 PROCEDURE — 90837 PSYTX W PT 60 MINUTES: CPT | Mod: S$GLB,,, | Performed by: SOCIAL WORKER

## 2017-12-06 NOTE — PROGRESS NOTES
"Individual Psychotherapy (PhD/LCSW)    12/6/2017    Site:  [facility]         Therapeutic Intervention: Met with patient.  Outpatient - Insight oriented psychotherapy 60 min - CPT code 72258 and Outpatient - Supportive psychotherapy 60 min - CPT Code 46254    Chief complaint/reason for encounter: depression (guilt, low self-worth)    Interval history and content of current session:   Eladio arrived to session casually dressed.  He states things at home continue to be well and he and his wife and children have been getting along.  He was accepted into graduate school program at [employer] and has a promotion meeting with his employer, [employer].  Eladio initially states that he hasn't had to use his thought record or list of unhelpful thoughts because "none of those thoughts have come up".  However, when asked what he'd like to focus on today he shared that he's experiencing some guilt around starting graduate school.  Explored the unhelpful thoughts that center around "being selfish" and "puting my family in further debt".  Challenged these thoughts with other facts/counter-thoughts: increased income for family once he's finished the program, he has the support of his family, gives him more career options/mobility, sets a positive example for his children re: work ethic, pursuing passions.  Eladio labels himself as analytical and so has interest in getting to the root of his low self-worth and persistent sense of guilt.  However, upon further exploration it was difficult for him to identify any significant events from childhood that contributed even though he clearly remembers experiencing these thoughts/feelings as a child.  I encouraged use of the thought record and challenging unhelpful thoughts and we will continue to explore these issues in future sessions.    Treatment plan:  · Target symptoms: depression, anxiety , adjustment  · Why chosen therapy is appropriate versus another modality: relevant to " diagnosis  · Outcome monitoring methods: self-report, checklist/rating scale  · Therapeutic intervention type: insight oriented psychotherapy, supportive psychotherapy, cognitive therapy    Risk parameters:  Patient reports no suicidal ideation  Patient reports no homicidal ideation  Patient reports no self-injurious behavior  Patient reports no violent behavior    Verbal deficits: None    Patient's response to intervention:  The patient's response to intervention is accepting.    Progress toward goals and other mental status changes:  The patient's progress toward goals is good.    Diagnosis:     ICD-10-CM ICD-9-CM   1. Persistent adjustment disorder with mixed anxiety and depressed mood F43.23 309.28        Plan:  individual psychotherapy   Eladio will choose a gym to enroll in, so that he can resume his exercise routine.    Eladio will use thought record to track/examine cognitive distortions.      Return to clinic: 2 weeks    Length of Service (minutes): 60

## 2017-12-19 ENCOUNTER — OFFICE VISIT (OUTPATIENT)
Dept: NEUROLOGY | Facility: CLINIC | Age: 42
End: 2017-12-19
Payer: COMMERCIAL

## 2017-12-19 DIAGNOSIS — F43.21 GRIEF: ICD-10-CM

## 2017-12-19 DIAGNOSIS — F43.23 PERSISTENT ADJUSTMENT DISORDER WITH MIXED ANXIETY AND DEPRESSED MOOD: Primary | ICD-10-CM

## 2017-12-19 PROCEDURE — 90837 PSYTX W PT 60 MINUTES: CPT | Mod: S$GLB,,, | Performed by: SOCIAL WORKER

## 2017-12-19 NOTE — PROGRESS NOTES
"Individual Psychotherapy (PhD/LCSW)    12/19/2017    Site:  [facility]         Therapeutic Intervention: Met with patient.  Outpatient - Insight oriented psychotherapy 60 min - CPT code 20339 and Outpatient - Supportive psychotherapy 60 min - CPT Code 48863    Chief complaint/reason for encounter: depression (guilt, low self-worth)    Interval history and content of current session:   Eladio arrived a few minutes early to session, casually dressed.  Denies any stressors at home.  Starts graduate school in January and is no longer experiencing the guilt that he felt last week.      Eladio arrived to session casually dressed.  He states things at home continue to be well and he and his wife and children have been getting along.  Focused today on pt's persistent sense of low self-worth and tendency to feel guilty.  He shared that he's thought about this since our last session and remains unsure of the root cause.  He shared more about his childhood, today, and through this discussion he recalled feeling that he was never good enough or worthy of his father's love and attention.  He felt this despite the fact that his father never belittled him or held him to unattainable standards, but rather because his father was emotionally guarded and because Eladio blamed himself for this distance; "it's because I was not like him".  Eladio was tearful as he talked about his father and how their relationship deepened as they both aged and as his father sought help for war-related emotional difficulties.  By the end of the conversation Eladio was able to verbalize that perhaps his low-self worth was not actually his fault but was a result of this dynamic from his childhood.  He was also able to identify times where his father clearly expressed his appreciation for and admiration of Eladio.      Treatment plan:  · Target symptoms: depression, grief  · Why chosen therapy is appropriate versus another modality: relevant to " diagnosis  · Outcome monitoring methods: self-report, checklist/rating scale  · Therapeutic intervention type: insight oriented psychotherapy, supportive psychotherapy    Risk parameters:  Patient reports no suicidal ideation  Patient reports no homicidal ideation  Patient reports no self-injurious behavior  Patient reports no violent behavior    Verbal deficits: None    Patient's response to intervention:  The patient's response to intervention is accepting.    Progress toward goals and other mental status changes:  The patient's progress toward goals is good.    Diagnosis:     ICD-10-CM ICD-9-CM   1. Persistent adjustment disorder with mixed anxiety and depressed mood F43.23 309.28   2. Grief F43.20 309.0        Plan:  individual psychotherapy     Return to clinic: 2 weeks    Length of Service (minutes): 60

## 2018-01-02 ENCOUNTER — TELEPHONE (OUTPATIENT)
Dept: PHARMACY | Facility: CLINIC | Age: 43
End: 2018-01-02

## 2018-01-31 RX ORDER — GLATIRAMER ACETATE 40 MG/ML
INJECTION, SOLUTION SUBCUTANEOUS
Qty: 36 ML | Refills: 0 | Status: SHIPPED | OUTPATIENT
Start: 2018-01-31 | End: 2018-04-23 | Stop reason: SDUPTHER

## 2018-02-01 ENCOUNTER — TELEPHONE (OUTPATIENT)
Dept: PHARMACY | Facility: CLINIC | Age: 43
End: 2018-02-01

## 2018-02-02 ENCOUNTER — TELEPHONE (OUTPATIENT)
Dept: PHARMACY | Facility: CLINIC | Age: 43
End: 2018-02-02

## 2018-02-02 NOTE — TELEPHONE ENCOUNTER
DOCUMENTATION ONLY:  Prior authorization for Copaxone approved from 02/01/2018 to 01/31/2019    Case Id: [id number]    Co-pay: $250    Patient Assistance is already on file. Copay is $0  JLP

## 2018-02-02 NOTE — TELEPHONE ENCOUNTER
Spoke to patient for Copaxone refill. Verified 2 patient identifiers. Reviewed general adherence. Patient has 4 injections remaining and injects every MWF.    No missed doses. No new medications, allergies, health conditions or side effects reported at this time. Address confirmed. Will ship Copaxone on 2/7 for 2/8 delivery via [employer]. $0 Copay- 004. Patient voiced understanding.    Mirian Hicks, Pharm.D  Specialty Pharmacy Clinical Pharmacist  [facility]  Phone: [phone]

## 2018-02-28 ENCOUNTER — TELEPHONE (OUTPATIENT)
Dept: PHARMACY | Facility: CLINIC | Age: 43
End: 2018-02-28

## 2018-03-07 ENCOUNTER — PATIENT MESSAGE (OUTPATIENT)
Dept: NEUROLOGY | Facility: CLINIC | Age: 43
End: 2018-03-07

## 2018-03-26 ENCOUNTER — TELEPHONE (OUTPATIENT)
Dept: PHARMACY | Facility: CLINIC | Age: 43
End: 2018-03-26

## 2018-03-27 ENCOUNTER — PATIENT MESSAGE (OUTPATIENT)
Dept: NEUROLOGY | Facility: CLINIC | Age: 43
End: 2018-03-27

## 2018-04-09 ENCOUNTER — PROCEDURE VISIT (OUTPATIENT)
Dept: OPHTHALMOLOGY | Facility: CLINIC | Age: 43
End: 2018-04-09
Payer: COMMERCIAL

## 2018-04-09 VITALS — HEART RATE: 89 BPM | DIASTOLIC BLOOD PRESSURE: 89 MMHG | SYSTOLIC BLOOD PRESSURE: 146 MMHG

## 2018-04-09 DIAGNOSIS — H01.009 BLEPHARITIS, UNSPECIFIED LATERALITY, UNSPECIFIED TYPE: ICD-10-CM

## 2018-04-09 DIAGNOSIS — H57.9 LESION OF EYE: Primary | ICD-10-CM

## 2018-04-09 PROCEDURE — 92285 EXTERNAL OCULAR PHOTOGRAPHY: CPT | Mod: S$GLB,,, | Performed by: OPHTHALMOLOGY

## 2018-04-09 PROCEDURE — 11440 EXC FACE-MM B9+MARG 0.5 CM/<: CPT | Mod: S$GLB,,, | Performed by: OPHTHALMOLOGY

## 2018-04-09 PROCEDURE — 88304 TISSUE EXAM BY PATHOLOGIST: CPT | Performed by: PATHOLOGY

## 2018-04-09 PROCEDURE — 92014 COMPRE OPH EXAM EST PT 1/>: CPT | Mod: 57,S$GLB,, | Performed by: OPHTHALMOLOGY

## 2018-04-09 RX ORDER — NEOMYCIN SULFATE, POLYMYXIN B SULFATE AND DEXAMETHASONE 3.5; 10000; 1 MG/ML; [USP'U]/ML; MG/ML
1 SUSPENSION/ DROPS OPHTHALMIC
Qty: 5 ML | Refills: 0 | Status: SHIPPED | OUTPATIENT
Start: 2018-04-09 | End: 2018-04-18

## 2018-04-09 NOTE — PROGRESS NOTES
HPI     Eye Problem    Additional comments: Lesion removal OU           Comments   Mr. Hendricks is here today for a lesion removal OU He states he is doing well,   no problems or complaints. He is not using any GTTS or SUSHANT He denies any   eye pain or irritation.        Last edited by Cecy Matta, PCT on 4/9/2018  2:11 PM. (History)            Assessment /Plan     For exam results, see Encounter Report.    Lesion of eye  -     External/Slit Lamp Photography    Blepharitis, unspecified laterality, unspecified type      Patient here for evaluation of eyelid skin lesions, both eyes. No ocular history, does not recall having a dilated examination.     Skin lesions are causing irritation. Patient has family history of skin cancer, mother and grandfather.     Patient's grandfather had wet AMD, fundus examination today wnl    Recommend removal of lesions and sending for pathology.     Procedure Note    Attending: Melvi Meza  Resident: Amari Sim  Pre-op Dx: Eyelid lesions bilateral   Post-op Dx: same  Local: 2% lidocaine with epinephrine with 0.5% marcaine and 4% NaHCO3 and vitrase  Specimens: lid lesions (>15), right and left upper and lower eyelids  Complications: None  Blood Loss: minimal    The patient was prepped and draped in the usual sterile manner for ophthalmic plastic surgery.  A corneal shield was placed in the right palpebral fissure. 1 cc of local anesthesia was given to the right and left eyelids. Surgical forceps were used to elevate the lesions and sharp Gwen scissors were used to excise the lesion from its base. This was repeated multiple times on the right upper and lower eyelids (>10). The corneal shield was removed and the above steps were repeated in their entirety for the left eye. The tissue was sent to pathology.  High-temp cautery was used to obtain hemostasis. The corneal shield was removed. Maxitrol ointment was applied  to the wound. The patient tolerated the procedure well. There were no  complications.     Post-operative instructions were given to the patient.     Return PRN    I have reviewed and concur with the resident's history, physical, assessment, and plan.  I have personally interviewed and examined the patient.

## 2018-04-18 ENCOUNTER — OFFICE VISIT (OUTPATIENT)
Dept: NEUROLOGY | Facility: CLINIC | Age: 43
End: 2018-04-18
Payer: COMMERCIAL

## 2018-04-18 ENCOUNTER — LAB VISIT (OUTPATIENT)
Dept: LAB | Facility: HOSPITAL | Age: 43
End: 2018-04-18
Attending: PSYCHIATRY & NEUROLOGY
Payer: COMMERCIAL

## 2018-04-18 VITALS
HEART RATE: 94 BPM | BODY MASS INDEX: 36.22 KG/M2 | HEIGHT: 70 IN | WEIGHT: 253 LBS | DIASTOLIC BLOOD PRESSURE: 80 MMHG | SYSTOLIC BLOOD PRESSURE: 139 MMHG

## 2018-04-18 DIAGNOSIS — E55.9 VITAMIN D DEFICIENCY: ICD-10-CM

## 2018-04-18 DIAGNOSIS — G35 MS (MULTIPLE SCLEROSIS): Primary | ICD-10-CM

## 2018-04-18 DIAGNOSIS — Z29.89 PROPHYLACTIC IMMUNOTHERAPY: ICD-10-CM

## 2018-04-18 DIAGNOSIS — Z71.89 COUNSELING REGARDING GOALS OF CARE: ICD-10-CM

## 2018-04-18 DIAGNOSIS — G35 MS (MULTIPLE SCLEROSIS): ICD-10-CM

## 2018-04-18 LAB — 25(OH)D3+25(OH)D2 SERPL-MCNC: 61 NG/ML

## 2018-04-18 PROCEDURE — 99999 PR PBB SHADOW E&M-EST. PATIENT-LVL III: CPT | Mod: PBBFAC,,, | Performed by: PSYCHIATRY & NEUROLOGY

## 2018-04-18 PROCEDURE — 82306 VITAMIN D 25 HYDROXY: CPT

## 2018-04-18 PROCEDURE — 36415 COLL VENOUS BLD VENIPUNCTURE: CPT

## 2018-04-18 PROCEDURE — 99215 OFFICE O/P EST HI 40 MIN: CPT | Mod: S$GLB,,, | Performed by: PSYCHIATRY & NEUROLOGY

## 2018-04-18 NOTE — PROGRESS NOTES
"Subjective:       Patient ID: Dory Raza is a 43 y.o. male who presents today for a routine clinic visit for MS.      MS HPI:  · DMT: Copaxone  · Side effects from DMT? No  · Taking vitamin D3 as recommended? Yes - 6,000 IU/day    · He had sleep study, but does not think that the exam was accurate b/c he was so uncomfortable and could not sleep; we agreed that he'd see a sleep MD, but he has not been able to do this yet; he plans on it; states snoring is better;   · Having issues with vision focus at times--about to see ophtho    SOCIAL HISTORY  Social History   Substance Use Topics    Smoking status: Former Smoker     Types: Cigarettes     Quit date: 2/18/2010    Smokeless tobacco: Never Used    Alcohol use 0.0 oz/week      Comment: socially     Living arrangements - the patient lives with their family.  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;  · Sleep Disturbance: snoring is slightly better as in HPI  · Bladder Dysfunction: No  · Bowel Dysfunction: No  · Spasticity: No  · Visual Symptoms: Yes - as in HPI  · Cognitive: Yes - some issues with attention; back in school to become a NP  · Mood Disorder: Yes - pt feels okay; states his wife feels "he's edgy"; planning to see Xenia again soon;   · Gait Disturbance: No  · Falls: No  · Hand Dysfunction: No  · Pain: No  · Sexual Dysfunction: stable on Viagra  · Skin Breakdown: No  · Tremors: No  · Dysphagia:  No  · Dysarthria:  No  · Heat sensitivity:  He's not sure, but thinks so;   · Any un-met adaptive needs? No  · Copay Assist?  Yes - $0      Objective:        25 foot timed walk: 3.1seconds without assist; was 3.1s without assist  Neurologic Exam      MENTAL STATUS: grossly intact  CRANIAL NERVE EXAM: There is no internuclear ophthalmoplegia. Extraocular   muscles are intact.  No facial   asymmetry.There is no dysarthria.   MOTOR EXAM: " Normal bulk and tone throughout UE and LE bilaterally. Rapid sequential movements are normal. Strength is 5/5 in all groups   in the lower extremities and upper extremities.   REFLEXES: Symmetric and 2+ throughout in all 4 extremities.   SENSORY EXAM: Normal to vibration t/o  COORDINATION: Normal finger-to-nose exam.   GAIT: Narrow based and stable.      Imaging:     Results for orders placed during the hospital encounter of 09/01/17   MRI Brain W WO Contrast    Impression      A solitary focus of flair signal abnormality is noted perpendicular to the posterior horn of the right lateral ventricle near the centrum semi-ovale with no detrimental change since the prior. This solitary lesion has a characteristic location and orientation for multiple sclerosis and a cervical spine focus was noted on a 9/8/15. No diffusion positivity or enhancement is noted on this exam          Electronically signed by: Ivan Su MD  Date:     09/01/17  Time:    10:23      Results for orders placed during the hospital encounter of 09/08/15   MRI Cervical Spine W WO Cont    Impression Stable lesions in the right periventricular white matter and cervical cord without evidence for postcontrast enhancement.  Given patient's clinical history and location of these lesions findings could represent demyelinating disease such as multiple   sclerosis.  Overall no significant change compared to 10/3/2014.  No new enhancing plaques are seen to suggest active disease.  ______________________________________     Electronically signed by resident: Nicholas El  Date:     09/09/15  Time:    13:31          As the supervising and teaching physician, I personally reviewed the images and resident's interpretation and I agree with the findings.        Electronically signed by: GUDELIA LONDONO MD  Date:     09/09/15  Time:    17:22      No results found for this or any previous visit.      Labs:     Lab Results   Component Value Date    [id number] 61  04/18/2018    [id number] 51 03/23/2017    [id number] 60 03/28/2016     No results found for: JCVINDEX, JCVANTIBODY  No results found for: AG0UOQAB, ABSOLUTECD3, WU1VLSAE, ABSOLUTECD8, LX5TUWAF, ABSOLUTECD4, LABCD48  No results found for: WBC, RBC, HGB, HCT, MCV, MCH, MCHC, RDW, PLT, MPV, GRAN, LYMPH, MONO, EOS, BASO, EOSINOPHIL, BASOPHIL  Sodium   Date Value Ref Range Status   12/14/2010 140 136 - 145 mMol/l Final     Potassium   Date Value Ref Range Status   12/14/2010 4.0 3.5 - 5.1 mMol/l Final     Chloride   Date Value Ref Range Status   12/14/2010 104 95 - 110 mMol/l Final     CO2   Date Value Ref Range Status   12/14/2010 24 23.0 - 29.0 mEq/L Final     Glucose   Date Value Ref Range Status   12/14/2010 90 70 - 110 mg/dl Final     BUN, Bld   Date Value Ref Range Status   12/14/2010 14 6 - 20 mg/dl Final     Creatinine   Date Value Ref Range Status   12/14/2010 0.9 0.5 - 1.4 mg/dl Final     Calcium   Date Value Ref Range Status   12/14/2010 9.7 8.7 - 10.5 mg/dl Final     Total Protein   Date Value Ref Range Status   01/14/2011 7.0 6.0 - 8.4 g/dL Final     Albumin   Date Value Ref Range Status   01/14/2011 4.4 3.5 - 5.2 g/dl Final     Total Bilirubin   Date Value Ref Range Status   01/14/2011 1.1 (H) 0.1 - 1.0 mg/dl Final     Comment:     For infants and newborns, interpretation of results should be based  on gestational age, weight and in agreement with clinical  observations.  .  Premature Infant recommended reference ranges:  Up to 24 hours.............<8.0 mg/dl  Up to 48 hours............<12.0 mg/dl  3-5 days..................<15.0 mg/dl  6-29 days.................<15.0 mg/dl     Alkaline Phosphatase   Date Value Ref Range Status   01/14/2011 87 55 - 135 U/L Final     AST   Date Value Ref Range Status   01/14/2011 23 10 - 40 U/L Final     ALT   Date Value Ref Range Status   01/14/2011 48 (H) 10 - 44 U/L Final     Anion Gap   Date Value Ref Range Status   12/14/2010 17 10 - 20 mmol/L Final     eGFR if     Date Value Ref Range Status   12/14/2010 >60 >60 mL/min Final     Comment:     Estimated glomerular filtration rate (eGFR) is normalized to an  average body surface area of 1.73 square meters.  The calculation  used to obtain the eGFR is the adjusted MDRD equation, which factors  patient sex, age, race, and creatinine result.  Since race is unknown  in our information system, the eGFR values for -American  and Non--American patients are given for each creatinine  result.     eGFR if non    Date Value Ref Range Status   12/14/2010 >60 >60 mL/min Final       Diagnosis/Assessment/Plan:    1. Multiple Sclerosis  · Assessment: Pt is clinically stable on Copaxone and high dose D3  · Imaging: annual MRI brain planned Sept 2018  · Disease Modifying Therapies: continue Copaxone and high dose D3    2. MS Symptom Assessment / Management  · No other changes to regimen described in ROS above      F/u 6 months with Katrin Sesay CNS    Over 50% of this 40 minute visit was spent in direct face to face counseling of the patient about MS, DMT considerations, and MS symptom management.         MS (multiple sclerosis)  -     Vitamin D; Future  -     MRI Brain W WO Contrast; Future; Expected date: 04/18/2018    Vitamin D deficiency  -     Vitamin D; Future

## 2018-04-24 RX ORDER — GLATIRAMER 40 MG/ML
40 INJECTION, SOLUTION SUBCUTANEOUS
Qty: 36 ML | Refills: 0 | Status: SHIPPED | OUTPATIENT
Start: 2018-05-07 | End: 2018-07-17

## 2018-05-08 ENCOUNTER — TELEPHONE (OUTPATIENT)
Dept: OPHTHALMOLOGY | Facility: CLINIC | Age: 43
End: 2018-05-08

## 2018-05-28 ENCOUNTER — TELEPHONE (OUTPATIENT)
Dept: PHARMACY | Facility: CLINIC | Age: 43
End: 2018-05-28

## 2018-06-21 ENCOUNTER — TELEPHONE (OUTPATIENT)
Dept: PHARMACY | Facility: CLINIC | Age: 43
End: 2018-06-21

## 2018-07-17 DIAGNOSIS — G35 MULTIPLE SCLEROSIS: Primary | ICD-10-CM

## 2018-07-17 RX ORDER — GLATIRAMER 40 MG/ML
40 INJECTION, SOLUTION SUBCUTANEOUS
Qty: 36 ML | Refills: 1 | Status: SHIPPED | OUTPATIENT
Start: 2018-07-18 | End: 2019-01-03

## 2018-07-19 ENCOUNTER — TELEPHONE (OUTPATIENT)
Dept: PHARMACY | Facility: CLINIC | Age: 43
End: 2018-07-19

## 2018-07-26 ENCOUNTER — PATIENT MESSAGE (OUTPATIENT)
Dept: ADMINISTRATIVE | Facility: OTHER | Age: 43
End: 2018-07-26

## 2018-08-21 ENCOUNTER — TELEPHONE (OUTPATIENT)
Dept: PHARMACY | Facility: CLINIC | Age: 43
End: 2018-08-21

## 2018-09-12 ENCOUNTER — TELEPHONE (OUTPATIENT)
Dept: PHARMACY | Facility: CLINIC | Age: 43
End: 2018-09-12

## 2018-09-17 DIAGNOSIS — N52.9 ERECTILE DYSFUNCTION, UNSPECIFIED ERECTILE DYSFUNCTION TYPE: ICD-10-CM

## 2018-09-17 RX ORDER — SILDENAFIL 100 MG/1
100 TABLET, FILM COATED ORAL DAILY PRN
Qty: 15 TABLET | Refills: 2 | Status: SHIPPED | OUTPATIENT
Start: 2018-09-17 | End: 2018-11-29 | Stop reason: SDUPTHER

## 2018-09-18 ENCOUNTER — HOSPITAL ENCOUNTER (OUTPATIENT)
Dept: RADIOLOGY | Facility: HOSPITAL | Age: 43
Discharge: HOME | End: 2018-09-18
Attending: PSYCHIATRY & NEUROLOGY
Payer: COMMERCIAL

## 2018-09-18 DIAGNOSIS — G35 MS (MULTIPLE SCLEROSIS): ICD-10-CM

## 2018-09-18 PROCEDURE — 70553 MRI BRAIN STEM W/O & W/DYE: CPT | Mod: 26,,, | Performed by: RADIOLOGY

## 2018-09-18 PROCEDURE — 70553 MRI BRAIN STEM W/O & W/DYE: CPT | Mod: TC,PO

## 2018-09-18 PROCEDURE — A9585 GADOBUTROL INJECTION: HCPCS | Mod: PO | Performed by: PSYCHIATRY & NEUROLOGY

## 2018-09-18 PROCEDURE — 25500020 PHARM REV CODE 255: Mod: PO | Performed by: PSYCHIATRY & NEUROLOGY

## 2018-09-18 RX ORDER — GADOBUTROL 604.72 MG/ML
10 INJECTION INTRAVENOUS
Status: COMPLETED | OUTPATIENT
Start: 2018-09-18 | End: 2018-09-18

## 2018-09-18 RX ADMIN — GADOBUTROL 10 ML: 604.72 INJECTION INTRAVENOUS at 10:09

## 2018-09-20 ENCOUNTER — PATIENT MESSAGE (OUTPATIENT)
Dept: NEUROLOGY | Facility: CLINIC | Age: 43
End: 2018-09-20

## 2018-09-27 ENCOUNTER — OFFICE VISIT (OUTPATIENT)
Dept: NEUROLOGY | Facility: CLINIC | Age: 43
End: 2018-09-27
Payer: COMMERCIAL

## 2018-09-27 VITALS
WEIGHT: 241.31 LBS | SYSTOLIC BLOOD PRESSURE: 139 MMHG | HEIGHT: 70 IN | DIASTOLIC BLOOD PRESSURE: 85 MMHG | HEART RATE: 73 BPM | BODY MASS INDEX: 34.55 KG/M2

## 2018-09-27 DIAGNOSIS — Z71.89 COUNSELING REGARDING GOALS OF CARE: ICD-10-CM

## 2018-09-27 DIAGNOSIS — G35 MULTIPLE SCLEROSIS: Primary | ICD-10-CM

## 2018-09-27 DIAGNOSIS — Z29.89 PROPHYLACTIC IMMUNOTHERAPY: ICD-10-CM

## 2018-09-27 DIAGNOSIS — R53.83 FATIGUE, UNSPECIFIED TYPE: ICD-10-CM

## 2018-09-27 PROCEDURE — 99214 OFFICE O/P EST MOD 30 MIN: CPT | Mod: S$GLB,,, | Performed by: CLINICAL NURSE SPECIALIST

## 2018-09-27 PROCEDURE — 99999 PR PBB SHADOW E&M-EST. PATIENT-LVL III: CPT | Mod: PBBFAC,,, | Performed by: CLINICAL NURSE SPECIALIST

## 2018-09-27 PROCEDURE — 3008F BODY MASS INDEX DOCD: CPT | Mod: CPTII,S$GLB,, | Performed by: CLINICAL NURSE SPECIALIST

## 2018-09-27 NOTE — Clinical Note
Dory is struggling with fatigue and lack of attention and focus, especially in the late afternoon and evening. 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 He has been on Adderall 40mg in the past, has taken Co-Q-10, and is currently taking Modafinil, which he does not feel is helpful. He asked about Vyvanse. It seems to be that he has tried many of the other options. Are you opposed to trying this in him?

## 2018-09-27 NOTE — PROGRESS NOTES
"Subjective:       Patient ID: Dory Raza is a 43 y.o. male who presents today for a routine clinic visit for MS.  The history has been provided by the patient. He was last seen by Dr. Hernandez in 2018.     MS HPI:  · DMT: Copaxone   · Side effects from DMT? No  · Taking vitamin D3 as recommended? Yes -  Dose: 5000 units   · He did a sleep study, and he reports that it was "the most uncomfortable experience of my entire life."   · He has lost 10-12lbs, and his wife reports that his snoring has gone down considerably. He is exercising a little, but is more focused on watching what he eats.   · His energy level is about the same. It is worse later in the evening and into the night. He is having trouble with focus in the evening. His focus is "very short-lived." He was on Adderall for a while, but the effect tapered off. He did take up to 40mg at some point.     SOCIAL HISTORY  Social History     Tobacco Use    Smoking status: Former Smoker     Types: Cigarettes     Last attempt to quit: 2010     Years since quittin.6    Smokeless tobacco: Never Used   Substance Use Topics    Alcohol use: Yes     Alcohol/week: 0.0 oz     Comment: socially    Drug use: No     Living arrangements - the patient lives with his family.  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   · Sleep Disturbance: Snoring is improved.   · Bladder Dysfunction: No  · Bowel Dysfunction: No  · Spasticity: No; minor fasciculations, but nothing major   · Visual Symptoms: Yes - He is not sure if he has age-related changes or not, but he has to hold papers farther away when reading now. He needs an appt with his eye doctor.   · Cognitive: Yes - As above, he has some issues with attention. He plans things meticulously. He feels like he has a harder time thinking of words sometimes, and his vocabulary is not as " "expansive as it used to be.   · Mood Disorder: Yes - He denies depression or anxiety. His wife says he is "edgy" from time to time.   · Gait Disturbance: No  · Falls: No  · Hand Dysfunction: No  · Pain: No  · Sexual Dysfunction: Viagra helps.   · Skin Breakdown: No  · Tremors: No  · Dysphagia:  No  · Dysarthria:  No  · Heat sensitivity:  He is not heat sensitive.   · Any un-met adaptive needs? No  · Copay Assist?  Yes - $0          Objective:        1. 25 foot timed walk: 2.95 seconds today without assist; was 3.1 seconds at last visit without assist   Neurologic Exam     Mental Status   Oriented to person, place, and time.   Attention: normal. Concentration: normal.   Speech: speech is normal   Level of consciousness: alert  Knowledge: good.   Normal comprehension.     Cranial Nerves     CN II   Visual acuity: (20/25 OD, 20/20 OS with glasses )    CN III, IV, VI   Pupils are equal, round, and reactive to light.  Extraocular motions are normal.   Right pupil: Shape: regular. Reactivity: brisk.   Left pupil: Shape: regular. Reactivity: brisk.   CN III: no CN III palsy  CN VI: no CN VI palsy  Nystagmus: none     CN V   Right facial sensation deficit: none  Left facial sensation deficit: none    CN VII   Right facial weakness: none  Left facial weakness: none    CN VIII   Hearing: intact    CN IX, X   Palate: symmetric    CN XI   Right sternocleidomastoid strength: normal  Left sternocleidomastoid strength: normal  Right trapezius strength: normal  Left trapezius strength: normal    CN XII   Tongue deviation: none    Motor Exam   Muscle bulk: normal  Overall muscle tone: normal  Right arm tone: normal  Left arm tone: normal  Right leg tone: normal  Left leg tone: normal    Strength   Right neck flexion: 5/5  Left neck flexion: 5/5  Right neck extension: 5/5  Left neck extension: 5/5  Right deltoid: 5/5  Left deltoid: 5/5  Right biceps: 5/5  Left biceps: 5/5  Right triceps: 5/5  Left triceps: 5/5  Right wrist flexion: " 5/5  Left wrist flexion: 5/5  Right wrist extension: 5/5  Left wrist extension: 5/5  Right interossei: 5/5  Left interossei: 5/5  Right iliopsoas: 5/5  Left iliopsoas: 5/5  Right quadriceps: 5/5  Left quadriceps: 5/5  Right hamstrin/5  Left hamstrin/5  Right anterior tibial: 5/5  Left anterior tibial: 5/5  Right gastroc: 5/5  Left gastroc: 5/5    Sensory Exam   Right arm vibration: normal  Left arm vibration: normal  Right leg vibration: normal  Left leg vibration: normal    Gait, Coordination, and Reflexes     Gait  Gait: normal    Coordination   Romberg: negative  Finger to nose coordination: normal  Heel to shin coordination: normal  Tandem walking coordination: normal    Tremor   Resting tremor: absent    Reflexes   Right brachioradialis: 2+  Left brachioradialis: 2+  Right biceps: 2+  Left biceps: 2+  Right triceps: 2+  Left triceps: 2+  Right patellar: 2+  Left patellar: 2+  Right achilles: 2+  Left achilles: 2+  Right plantar: normal  Left plantar: normal  He is able to walk on toes and heels and hop on each foot ten times.   Normal rapid sequential movements in upper and lower extremities.          Imaging:     Results for orders placed during the hospital encounter of 18   MRI Brain W WO Contrast    Impression 1. There is a stable appearance of the brain with a single right parietal white matter lesion without associated enhancement.  There are no additional regions of signal abnormality in the brain.  There is no pathologic enhancement.  There is no acute infarction.  The single lesion is nonspecific but consistent with the provided diagnosis of multiple sclerosis and is unchanged.      Electronically signed by: Abdirashid Barnes MD  Date:    2018  Time:    11:02     Images reviewed with the patient.   Labs:     Lab Results   Component Value Date    [id number] 61 2018    [id number] 51 2017    [id number] 60 2016       Diagnosis/Assessment/Plan:    1. Multiple  Sclerosis  · Assessment: Eladio is radiologically and clinically stable today on Copaxone.   · Imaging: MRI brain in September 2019  · Disease Modifying Therapies: Continue Copaxone and Vitamin D. Will check Vitamin D level at next visit.     2. MS Symptom Assessment / Management  · Fatigue: Will discuss change in Rx with Dr. Hernandez. He is interested in Vyvanse.   · Sleep Disturbance: Encourage continued weight loss, which may continue to reduce snoring.   · Cognitive: Will monitor.   · Sexual Dysfunction: Continue Viagra as needed.      Over 50% of this 30 minute visit was spent in direct face to face counseling of the patient about MS, DMT considerations, and MS symptom management. The patient agrees with the plan of care. I will see him back in 6 months.     Katrin Sesay, AGCNS-BC, MSCN        There are no diagnoses linked to this encounter.

## 2018-09-28 ENCOUNTER — PATIENT MESSAGE (OUTPATIENT)
Dept: NEUROLOGY | Facility: CLINIC | Age: 43
End: 2018-09-28

## 2018-09-28 ENCOUNTER — TELEPHONE (OUTPATIENT)
Dept: NEUROLOGY | Facility: CLINIC | Age: 43
End: 2018-09-28

## 2018-09-28 DIAGNOSIS — G35 MULTIPLE SCLEROSIS: Primary | ICD-10-CM

## 2018-09-28 DIAGNOSIS — R53.83 FATIGUE, UNSPECIFIED TYPE: ICD-10-CM

## 2018-09-28 DIAGNOSIS — Z86.59 HISTORY OF ATTENTION DEFICIT DISORDER: ICD-10-CM

## 2018-09-28 RX ORDER — LISDEXAMFETAMINE DIMESYLATE 30 MG/1
30 CAPSULE ORAL EVERY MORNING
Qty: 30 CAPSULE | Refills: 0 | Status: SHIPPED | OUTPATIENT
Start: 2018-09-28 | End: 2018-11-02 | Stop reason: SDUPTHER

## 2018-09-28 NOTE — TELEPHONE ENCOUNTER
----- Message from Ale Hernandez MD sent at 9/27/2018  5:37 PM CDT -----  No; can try  ----- Message -----  From: RUSSEL Moreau, CNS  Sent: 9/27/2018  11:23 AM  To: Ale Hernandez MD    Dory is struggling with fatigue and lack of attention and focus, especially in the late afternoon and evening. 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 He has been on Adderall 40mg in the past, has taken Co-Q-10, and is currently taking Modafinil, which he does not feel is helpful. He asked about Vyvanse. It seems to be that he has tried many of the other options. Are you opposed to trying this in him?

## 2018-10-09 ENCOUNTER — TELEPHONE (OUTPATIENT)
Dept: PHARMACY | Facility: CLINIC | Age: 43
End: 2018-10-09

## 2018-11-02 DIAGNOSIS — Z86.59 HISTORY OF ATTENTION DEFICIT DISORDER: ICD-10-CM

## 2018-11-02 DIAGNOSIS — R53.83 FATIGUE, UNSPECIFIED TYPE: ICD-10-CM

## 2018-11-02 DIAGNOSIS — G35 MULTIPLE SCLEROSIS: ICD-10-CM

## 2018-11-02 RX ORDER — LISDEXAMFETAMINE DIMESYLATE 30 MG/1
30 CAPSULE ORAL EVERY MORNING
Qty: 90 CAPSULE | Refills: 0 | Status: SHIPPED | OUTPATIENT
Start: 2018-11-02 | End: 2019-02-20 | Stop reason: SDUPTHER

## 2018-11-08 ENCOUNTER — TELEPHONE (OUTPATIENT)
Dept: PHARMACY | Facility: CLINIC | Age: 43
End: 2018-11-08
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
## 2018-11-29 DIAGNOSIS — N52.9 ERECTILE DYSFUNCTION, UNSPECIFIED ERECTILE DYSFUNCTION TYPE: ICD-10-CM

## 2018-11-30 RX ORDER — SILDENAFIL 100 MG/1
100 TABLET, FILM COATED ORAL DAILY PRN
Qty: 15 TABLET | Refills: 2 | Status: SHIPPED | OUTPATIENT
Start: 2018-11-30 | End: 2019-10-01 | Stop reason: SDUPTHER

## 2018-12-04 ENCOUNTER — TELEPHONE (OUTPATIENT)
Dept: PHARMACY | Facility: CLINIC | Age: 43
End: 2018-12-04

## 2019-01-03 DIAGNOSIS — G35 MULTIPLE SCLEROSIS: ICD-10-CM

## 2019-01-03 RX ORDER — GLATIRAMER 40 MG/ML
40 INJECTION, SOLUTION SUBCUTANEOUS
Qty: 36 ML | Refills: 1 | Status: CANCELLED | OUTPATIENT
Start: 2019-01-04

## 2019-01-07 ENCOUNTER — TELEPHONE (OUTPATIENT)
Dept: PHARMACY | Facility: CLINIC | Age: 44
End: 2019-01-07

## 2019-01-07 DIAGNOSIS — G35 MULTIPLE SCLEROSIS: ICD-10-CM

## 2019-01-07 RX ORDER — GLATIRAMER 40 MG/ML
40 INJECTION, SOLUTION SUBCUTANEOUS
Qty: 36 ML | Refills: 1 | Status: SHIPPED | OUTPATIENT
Start: 2019-01-07 | End: 2019-06-24

## 2019-01-14 NOTE — TELEPHONE ENCOUNTER
DOCUMENTATION ONLY:  Prior Authorization for Glatiramer approved from 01/07/19 to 01/06/20    Case Id: [id number]    Co-pay: $0    Patient Assistance IS NOT required  GALDINO
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
## 2019-02-07 ENCOUNTER — TELEPHONE (OUTPATIENT)
Dept: PHARMACY | Facility: CLINIC | Age: 44
End: 2019-02-07

## 2019-02-07 NOTE — TELEPHONE ENCOUNTER
Patient reached regard specialty medication refill for Copaxone 40mg/mL $0/004- Patient scheduled to have medication ship out on Tues 2/12 to arrive on Wed 2/13 address confirmed. Patient informed no new medications, conditions or allergies since last talked to [facility]. Patient have about 4 injections remaining & no missed dose. Patient declined questions for the clinical pharmacist. Patient voiced understanding.

## 2019-02-20 DIAGNOSIS — R53.83 FATIGUE, UNSPECIFIED TYPE: ICD-10-CM

## 2019-02-20 DIAGNOSIS — Z86.59 HISTORY OF ATTENTION DEFICIT DISORDER: ICD-10-CM

## 2019-02-20 DIAGNOSIS — G35 MULTIPLE SCLEROSIS: ICD-10-CM

## 2019-02-21 RX ORDER — LISDEXAMFETAMINE DIMESYLATE 30 MG/1
30 CAPSULE ORAL EVERY MORNING
Qty: 90 CAPSULE | Refills: 0 | Status: SHIPPED | OUTPATIENT
Start: 2019-02-21 | End: 2019-07-01 | Stop reason: SDUPTHER

## 2019-03-06 ENCOUNTER — TELEPHONE (OUTPATIENT)
Dept: PHARMACY | Facility: CLINIC | Age: 44
End: 2019-03-06

## 2019-03-15 ENCOUNTER — TELEPHONE (OUTPATIENT)
Dept: NEUROLOGY | Facility: CLINIC | Age: 44
End: 2019-03-15

## 2019-03-15 NOTE — TELEPHONE ENCOUNTER
----- Message from Shikha Jaquez sent at 3/15/2019 11:46 AM CDT -----  Contact: Pt  Needs Advice    Reason for call: Pt states he was informed by the pharmacy that a Vyvanse is needing a prior auth or medication change. Would like to discuss this matter        Communication Preference: # [phone]    Additional Information:

## 2019-03-27 ENCOUNTER — OFFICE VISIT (OUTPATIENT)
Dept: NEUROLOGY | Facility: CLINIC | Age: 44
End: 2019-03-27
Payer: COMMERCIAL

## 2019-03-27 ENCOUNTER — OCCUPATIONAL HEALTH (OUTPATIENT)
Dept: URGENT CARE | Facility: CLINIC | Age: 44
End: 2019-03-27

## 2019-03-27 ENCOUNTER — LAB VISIT (OUTPATIENT)
Dept: LAB | Facility: HOSPITAL | Age: 44
End: 2019-03-27
Payer: COMMERCIAL

## 2019-03-27 VITALS
DIASTOLIC BLOOD PRESSURE: 85 MMHG | HEIGHT: 70 IN | WEIGHT: 235.56 LBS | HEART RATE: 80 BPM | SYSTOLIC BLOOD PRESSURE: 129 MMHG | BODY MASS INDEX: 33.72 KG/M2

## 2019-03-27 DIAGNOSIS — Z71.89 COUNSELING REGARDING GOALS OF CARE: ICD-10-CM

## 2019-03-27 DIAGNOSIS — R53.83 FATIGUE, UNSPECIFIED TYPE: ICD-10-CM

## 2019-03-27 DIAGNOSIS — G35 MULTIPLE SCLEROSIS: ICD-10-CM

## 2019-03-27 DIAGNOSIS — G35 MULTIPLE SCLEROSIS: Primary | ICD-10-CM

## 2019-03-27 DIAGNOSIS — Z02.1 PRE-EMPLOYMENT EXAMINATION: Primary | ICD-10-CM

## 2019-03-27 DIAGNOSIS — Z29.89 PROPHYLACTIC IMMUNOTHERAPY: ICD-10-CM

## 2019-03-27 LAB — 25(OH)D3+25(OH)D2 SERPL-MCNC: 80 NG/ML (ref 30–96)

## 2019-03-27 PROCEDURE — 92552 AUDIOGRAM OCC MED: ICD-10-PCS | Mod: S$GLB,,, | Performed by: NURSE PRACTITIONER

## 2019-03-27 PROCEDURE — 99002 N95 MASK FIT: ICD-10-PCS | Mod: S$GLB,,, | Performed by: NURSE PRACTITIONER

## 2019-03-27 PROCEDURE — 99214 PR OFFICE/OUTPT VISIT, EST, LEVL IV, 30-39 MIN: ICD-10-PCS | Mod: S$GLB,,, | Performed by: CLINICAL NURSE SPECIALIST

## 2019-03-27 PROCEDURE — 92552 PURE TONE AUDIOMETRY AIR: CPT | Mod: S$GLB,,, | Performed by: NURSE PRACTITIONER

## 2019-03-27 PROCEDURE — 3008F BODY MASS INDEX DOCD: CPT | Mod: CPTII,S$GLB,, | Performed by: CLINICAL NURSE SPECIALIST

## 2019-03-27 PROCEDURE — 99214 OFFICE O/P EST MOD 30 MIN: CPT | Mod: S$GLB,,, | Performed by: CLINICAL NURSE SPECIALIST

## 2019-03-27 PROCEDURE — 99080 OSHA QUESTIONNAIRE: ICD-10-PCS | Mod: S$GLB,,, | Performed by: NURSE PRACTITIONER

## 2019-03-27 PROCEDURE — 99080 SPECIAL REPORTS OR FORMS: CPT | Mod: S$GLB,,, | Performed by: NURSE PRACTITIONER

## 2019-03-27 PROCEDURE — 82306 VITAMIN D 25 HYDROXY: CPT

## 2019-03-27 PROCEDURE — 36415 COLL VENOUS BLD VENIPUNCTURE: CPT

## 2019-03-27 PROCEDURE — 99999 PR PBB SHADOW E&M-EST. PATIENT-LVL III: CPT | Mod: PBBFAC,,, | Performed by: CLINICAL NURSE SPECIALIST

## 2019-03-27 PROCEDURE — 3008F PR BODY MASS INDEX (BMI) DOCUMENTED: ICD-10-PCS | Mod: CPTII,S$GLB,, | Performed by: CLINICAL NURSE SPECIALIST

## 2019-03-27 PROCEDURE — 99002 DEVICE HANDLING PHYS/QHP: CPT | Mod: S$GLB,,, | Performed by: NURSE PRACTITIONER

## 2019-03-27 PROCEDURE — 99999 PR PBB SHADOW E&M-EST. PATIENT-LVL III: ICD-10-PCS | Mod: PBBFAC,,, | Performed by: CLINICAL NURSE SPECIALIST

## 2019-03-27 NOTE — PROGRESS NOTES
Subjective:       Patient ID: Dory Raza is a 43 y.o. male who presents today for a fit-in clinic visit for MS.  The history has been provided by the patient. He was last seen in 2018.     MS HPI:  · DMT: glatiramer 40mg three times a week   · Side effects from DMT? No   Taking vitamin D3 as recommended? Yes -  Dose: 5000 units daily   He denies any new or different symptoms.   He is doing modified keto/intermittent fasting diet. He has lost 6 lbs since the last visit.       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 He has noticed some age-related changes.   · Cognitive: Yes - Stable. He is more on track if he takes Vyvanse. He has no issues with concentration while at work.   · Mood Disorder: No - He denies depression or anxiety.   · Gait Disturbance: No; stumbles from time to time--not new   · Falls: No  · Hand Dysfunction: No  · Pain: No  · Sexual Dysfunction: Viagra helps.    · Skin Breakdown: No  · Tremors: No  · Dysphagia:  No  · Dysarthria:  No  · Heat sensitivity:  He is not heat sensitive.   · Any un-met adaptive needs? No  · Copay Assist?  Yes - $0       Objective:        1. 25 foot timed walk: 2.76 seconds today without assist; was 2.95 seconds at last visit without assist   Neurologic Exam     Mental Status   Oriented to person, place, and time.    Attention: normal. Concentration: normal.   Speech: speech is normal   Level of consciousness: alert  Knowledge: good.   Normal comprehension.     Cranial Nerves     CN II   Visual acuity: normal with correction    CN III, IV, VI   Pupils are equal, round, and reactive to light.  Extraocular motions are normal.   Right pupil: Shape: regular. Reactivity: brisk.   Left pupil: Shape: regular. Reactivity: brisk.   CN III: no CN III palsy  CN VI: no CN VI palsy  Nystagmus: none     CN V   Right facial sensation deficit: none  Left facial sensation deficit: none    CN VII   Right facial weakness: none  Left facial weakness: none    CN VIII   Hearing: intact    CN IX, X   Palate: symmetric    CN XI   Right sternocleidomastoid strength: normal  Left sternocleidomastoid strength: normal  Right trapezius strength: normal  Left trapezius strength: normal    CN XII   Tongue deviation: none    Motor Exam   Muscle bulk: normal  Overall muscle tone: normal  Right arm tone: normal  Left arm tone: normal  Right leg tone: normal  Left leg tone: normal    Strength   Right neck flexion: 5/5  Left neck flexion: 5/5  Right neck extension: 5/5  Left neck extension: 5/5  Right deltoid: 5/5  Left deltoid: 5/5  Right biceps: 5/5  Left biceps: 5/5  Right triceps: 5/5  Left triceps: 5/5  Right wrist flexion: 5/5  Left wrist flexion: 5/5  Right wrist extension: 5/5  Left wrist extension: 5/5  Right interossei: 5/5  Left interossei: 5/5  Right iliopsoas: 5/5  Left iliopsoas: 5/5  Right quadriceps: 5/5  Left quadriceps: 5/5  Right hamstrin/5  Left hamstrin/5  Right anterior tibial: 5/5  Left anterior tibial: 5/5  Right gastroc: 5/5  Left gastroc: 5/5    Sensory Exam   Right arm vibration: normal  Left arm vibration: normal  Right leg vibration: normal  Left leg vibration: normal    Gait, Coordination, and Reflexes     Gait  Gait: normal    Coordination   Romberg: negative  Finger to nose coordination: normal  Heel to shin coordination:  normal  Tandem walking coordination: normal    Tremor   Resting tremor: absent    Reflexes   Right brachioradialis: 2+  Left brachioradialis: 2+  Right biceps: 2+  Left biceps: 2+  Right triceps: 2+  Left triceps: 2+  Right patellar: 2+  Left patellar: 2+  Right achilles: 2+  Left achilles: 2+  Right plantar: normal  Left plantar: normal  He is able to walk on toes and heels and hop on each foot ten times.   Normal rapid sequential movements in upper and lower extremities.          Imaging:     No new imaging to review today.   Labs:     Lab Results   Component Value Date    [id number] 61 04/18/2018    [id number] 51 03/23/2017    [id number] 60 03/28/2016     Diagnosis/Assessment/Plan:    1. Multiple Sclerosis  · Assessment: Eladio is clinically stable on Copaxone.   · Imaging: MRI brain without contrast in September 2019   · Disease Modifying Therapies: Continue Copaxone and Vitamin D. Will check Vitamin D level today.     2. MS Symptom Assessment / Management  · Fatigue: Continue Vyvanse.     Over 50% of this 25 minute visit was spent in direct face to face counseling of the patient about MS, DMT considerations, and MS symptom management. The patient agrees with the plan of care. He will follow up with Dr. Hernandez in 6 months.     Katrin Sesay, [facility] CenterNS-BC, MSCN        There are no diagnoses linked to this encounter.

## 2019-04-02 ENCOUNTER — TELEPHONE (OUTPATIENT)
Dept: PHARMACY | Facility: CLINIC | Age: 44
End: 2019-04-02

## 2019-04-29 ENCOUNTER — TELEPHONE (OUTPATIENT)
Dept: PHARMACY | Facility: CLINIC | Age: 44
End: 2019-04-29

## 2019-05-28 ENCOUNTER — TELEPHONE (OUTPATIENT)
Dept: PHARMACY | Facility: CLINIC | Age: 44
End: 2019-05-28

## 2019-06-12 ENCOUNTER — OFFICE VISIT (OUTPATIENT)
Dept: OPTOMETRY | Facility: CLINIC | Age: 44
End: 2019-06-12
Payer: COMMERCIAL

## 2019-06-12 DIAGNOSIS — H52.4 MYOPIA WITH ASTIGMATISM AND PRESBYOPIA, BILATERAL: ICD-10-CM

## 2019-06-12 DIAGNOSIS — H52.13 MYOPIA WITH ASTIGMATISM AND PRESBYOPIA, BILATERAL: ICD-10-CM

## 2019-06-12 DIAGNOSIS — H52.203 MYOPIA WITH ASTIGMATISM AND PRESBYOPIA, BILATERAL: ICD-10-CM

## 2019-06-12 DIAGNOSIS — H43.393 VITREOUS FLOATERS, BILATERAL: ICD-10-CM

## 2019-06-12 DIAGNOSIS — G35 MULTIPLE SCLEROSIS: Primary | ICD-10-CM

## 2019-06-12 PROCEDURE — 92014 PR EYE EXAM, EST PATIENT,COMPREHESV: ICD-10-PCS | Mod: S$GLB,,, | Performed by: OPTOMETRIST

## 2019-06-12 PROCEDURE — 92014 COMPRE OPH EXAM EST PT 1/>: CPT | Mod: S$GLB,,, | Performed by: OPTOMETRIST

## 2019-06-12 PROCEDURE — 92015 DETERMINE REFRACTIVE STATE: CPT | Mod: S$GLB,,, | Performed by: OPTOMETRIST

## 2019-06-12 PROCEDURE — 99999 PR PBB SHADOW E&M-EST. PATIENT-LVL III: CPT | Mod: PBBFAC,,, | Performed by: OPTOMETRIST

## 2019-06-12 PROCEDURE — 92015 PR REFRACTION: ICD-10-PCS | Mod: S$GLB,,, | Performed by: OPTOMETRIST

## 2019-06-12 PROCEDURE — 99999 PR PBB SHADOW E&M-EST. PATIENT-LVL III: ICD-10-PCS | Mod: PBBFAC,,, | Performed by: OPTOMETRIST

## 2019-06-12 NOTE — PROGRESS NOTES
HPI     HPI    Pt here today for yearly eye exam.      Would patient like a refraction today? Yes. Pt has noticed near vision is   blurry with current  glasses. Pt states current rx is 2 years. Pt states   has hard time focusing with current glasses.      (-)drops  (-)flashes  (+) hx of floaters  (-)diplopia     (-)Diabetes     OCULAR HISTORY  Last Eye Exam: 2 years  (-)eye surgery              Last edited by Matthew Starks on 6/12/2019  1:56 PM. (History)        ROS     Positive for: Eyes    Negative for: Constitutional, Gastrointestinal, Neurological, Skin,   Genitourinary, Musculoskeletal, HENT, Endocrine, Cardiovascular,   Respiratory, Psychiatric, Allergic/Imm, Heme/Lymph    Last edited by MIGUEL ÁNGEL Melo, OD on 6/12/2019  2:08 PM. (History)        Assessment /Plan     For exam results, see Encounter Report.    Multiple sclerosis    Vitreous floaters, bilateral    Myopia with astigmatism and presbyopia, bilateral      1. No ocular manifestations  2. RD precautions given  3. Updated specs rx, gave copy, fill prn    Discussed and educated patient on current findings /plan.  RTC 1 year, prn if any changes / issues

## 2019-06-20 ENCOUNTER — TELEPHONE (OUTPATIENT)
Dept: PHARMACY | Facility: CLINIC | Age: 44
End: 2019-06-20

## 2019-06-24 DIAGNOSIS — G35 MULTIPLE SCLEROSIS: ICD-10-CM

## 2019-06-24 RX ORDER — GLATIRAMER 40 MG/ML
40 INJECTION, SOLUTION SUBCUTANEOUS
Qty: 36 ML | Refills: 1 | Status: CANCELLED | OUTPATIENT
Start: 2019-06-24

## 2019-07-01 DIAGNOSIS — G35 MULTIPLE SCLEROSIS: ICD-10-CM

## 2019-07-01 DIAGNOSIS — Z86.59 HISTORY OF ATTENTION DEFICIT DISORDER: ICD-10-CM

## 2019-07-01 DIAGNOSIS — R53.83 FATIGUE, UNSPECIFIED TYPE: ICD-10-CM

## 2019-07-02 RX ORDER — LISDEXAMFETAMINE DIMESYLATE 30 MG/1
30 CAPSULE ORAL EVERY MORNING
Qty: 90 CAPSULE | Refills: 0 | Status: SHIPPED | OUTPATIENT
Start: 2019-07-02 | End: 2019-12-09 | Stop reason: SDUPTHER

## 2019-07-05 DIAGNOSIS — G35 MULTIPLE SCLEROSIS: ICD-10-CM

## 2019-07-05 RX ORDER — GLATIRAMER 40 MG/ML
40 INJECTION, SOLUTION SUBCUTANEOUS
Qty: 36 ML | Refills: 1 | Status: SHIPPED | OUTPATIENT
Start: 2019-07-05 | End: 2020-01-02

## 2019-07-05 RX ORDER — GLATIRAMER 40 MG/ML
40 INJECTION, SOLUTION SUBCUTANEOUS
Qty: 36 ML | Refills: 1 | Status: CANCELLED | OUTPATIENT
Start: 2019-07-05

## 2019-08-05 ENCOUNTER — TELEPHONE (OUTPATIENT)
Dept: PHARMACY | Facility: CLINIC | Age: 44
End: 2019-08-05

## 2019-09-05 ENCOUNTER — HOSPITAL ENCOUNTER (OUTPATIENT)
Dept: RADIOLOGY | Facility: HOSPITAL | Age: 44
Discharge: HOME | End: 2019-09-05
Attending: CLINICAL NURSE SPECIALIST
Payer: COMMERCIAL

## 2019-09-05 DIAGNOSIS — G35 MULTIPLE SCLEROSIS: ICD-10-CM

## 2019-09-05 PROCEDURE — 70551 MRI BRAIN STEM W/O DYE: CPT | Mod: 26,,, | Performed by: RADIOLOGY

## 2019-09-05 PROCEDURE — 70551 MRI BRAIN STEM W/O DYE: CPT | Mod: TC,PO

## 2019-09-05 PROCEDURE — 70551 MRI BRAIN DEMYELINATING WITHOUT CONTRAST: ICD-10-PCS | Mod: 26,,, | Performed by: RADIOLOGY

## 2019-09-09 ENCOUNTER — TELEPHONE (OUTPATIENT)
Dept: PHARMACY | Facility: CLINIC | Age: 44
End: 2019-09-09

## 2019-09-24 ENCOUNTER — OFFICE VISIT (OUTPATIENT)
Dept: NEUROLOGY | Facility: CLINIC | Age: 44
End: 2019-09-24
Payer: COMMERCIAL

## 2019-09-24 VITALS
HEART RATE: 76 BPM | SYSTOLIC BLOOD PRESSURE: 131 MMHG | WEIGHT: 234.38 LBS | DIASTOLIC BLOOD PRESSURE: 84 MMHG | HEIGHT: 70 IN | BODY MASS INDEX: 33.55 KG/M2

## 2019-09-24 DIAGNOSIS — G35 MULTIPLE SCLEROSIS: Primary | ICD-10-CM

## 2019-09-24 DIAGNOSIS — R90.89 ABNORMAL FINDING ON MRI OF BRAIN: ICD-10-CM

## 2019-09-24 DIAGNOSIS — Z71.89 COUNSELING REGARDING GOALS OF CARE: ICD-10-CM

## 2019-09-24 DIAGNOSIS — Z29.89 PROPHYLACTIC IMMUNOTHERAPY: ICD-10-CM

## 2019-09-24 PROCEDURE — 3008F BODY MASS INDEX DOCD: CPT | Mod: CPTII,S$GLB,, | Performed by: PSYCHIATRY & NEUROLOGY

## 2019-09-24 PROCEDURE — 99999 PR PBB SHADOW E&M-EST. PATIENT-LVL III: CPT | Mod: PBBFAC,,, | Performed by: PSYCHIATRY & NEUROLOGY

## 2019-09-24 PROCEDURE — 99999 PR PBB SHADOW E&M-EST. PATIENT-LVL III: ICD-10-PCS | Mod: PBBFAC,,, | Performed by: PSYCHIATRY & NEUROLOGY

## 2019-09-24 PROCEDURE — 99214 OFFICE O/P EST MOD 30 MIN: CPT | Mod: S$GLB,,, | Performed by: PSYCHIATRY & NEUROLOGY

## 2019-09-24 PROCEDURE — 3008F PR BODY MASS INDEX (BMI) DOCUMENTED: ICD-10-PCS | Mod: CPTII,S$GLB,, | Performed by: PSYCHIATRY & NEUROLOGY

## 2019-09-24 PROCEDURE — 99214 PR OFFICE/OUTPT VISIT, EST, LEVL IV, 30-39 MIN: ICD-10-PCS | Mod: S$GLB,,, | Performed by: PSYCHIATRY & NEUROLOGY

## 2019-09-24 NOTE — PROGRESS NOTES
Subjective:       Patient ID: Dory Raza is a 44 y.o. male who presents today for a routine clinic visit for MS.      MS HPI:  · DMT: Glatiramer TIW  · Side effects from DMT? No  · Taking vitamin D3 as recommended? Yes -  6,000 IU/day   · Overall feeling stable;     SOCIAL HISTORY  Social History     Tobacco Use    Smoking status: Former Smoker     Types: Cigarettes     Last attempt to quit: 2010     Years since quittin.6    Smokeless tobacco: Never Used   Substance Use Topics    Alcohol use: Yes     Alcohol/week: 0.0 standard drinks     Comment: socially    Drug use: No     Living arrangements - the patient lives with their family.  Employment : full time flight nurse; in school full time [employer] finishing undergrad; plans to go to NP school.       MS REVIEW OF SYMPTOMS 2019   Do you feel abnormally tired on most days? No   Do you feel you generally sleep well? Yes   Do you have difficulty controlling your bladder?  No   Do you have difficulty controlling your bowels?  No   Do you have frequent muscle cramps, tightness or spasms in your limbs?  No   Do you have new visual symptoms?  No   Do you have worsening difficulty with your memory or thinking? No  Feels vocabulary is not what it used to be   Do you have worsening symptoms of anxiety or depression?  No   For patients who walk, Do you have more difficulty walking?  No   Have you fallen since your last visit?  No   For patients who use wheelchairs: Do you have any skin wounds or breakdown? Not Applicable   Do you have difficulty using your hands?  No   Do you have shooting or burning pain? No   Do you have difficulty with sexual function?  Yes   If you are sexually active, are you using birth control? Y/N  N/A Yes   Do you often choke when swallowing liquids or solid food?  No   Do you experience worsening symptoms when overheated? No   Do you need any new equipment such as a wheelchair, walker or shower chair? No   Do you receive co-pay  financial assistance for your principal MS medicine? Yes   Would you be interested in participating in an MS research trial in the future? Yes   Do you feel you have adequate family/friend support?  Yes   Do you have health insurance?   Yes   Are you currently employed? Yes   Do you receive SSDI/SSI?  Not Applicable   Do you use marijuana or cannabis products? No   Have you been diagnosed with a urinary tract infection since your last visit here? No   Have you been diagnosed with a respiratory tract infection since your last visit here? No   Have you been to the emergency room since your last visit here? No   Have you been hospitalized since your last visit here?  No     FSS SCORE & INTERPRETATION 9/22/2019   FSS SCORE  11   FSS SCORE INTERPRETATION May not be suffering from fatigue     MS ZOEY-D SCORE & INTERPRETATION 9/22/2019   ZOEY-D SCORE  8   ZOEY-D INTERPRETATION  No indication of Depression     MS OG-7 SCORE & INTERPRETATION 9/22/2019   OG-7 SCORE  1   OG-7 SCORE INTERPRETATION Normal     PEQ MS MOS PAIN EFFECTS SCORE & INTERPRETATION 9/22/2019   PES SCORE 6   PES SCORE INTERPRETATION Scores can range from 6-30.  Items are scaled so that higher scores indicate a greater impact of pain on a patients mood and behavior.     PEQ MS SEXUAL SATISFACTION SCORE & INTERPRETATION 9/22/2019   SSS SCORE  14   SSS SCORE INTERPRETATION Scores can range from 4-24.  Higher scores indicate greater problems with sexual satisfaction.     MS BLADDER CONTROL SCORE & INTERPRETATION 9/22/2019   BLCS SCORE 0   BLCS SCORE INTERPRETATION  Scores can range from 0-22, with higher scores indicating greater bladder control problems.     MS BOWEL CONTROL SCORE & INTERPRETATION 9/22/2019   BWCS SCORE 0   BWCS SCORE INTERPRETATION Scores can range from 0-26, with higher scores indicating greater bowel control problems.     PEQ MS IMPACT OF VISUAL IMPAIRMENT SCORE & INTERPRETATION 9/22/2019   GREGORIO SCALE SCORE  0   GREGORIO SCORE INTERPRETATION  Scores can range from 0-15, with higher scores indicating greater impact of visual problems on daily activites.     MS PDQ SCORE & INTERPRETATION 9/22/2019   PDQ RETROSPECTIVE MEMORY SUBSCALE 1   PDQ ATTENTION/CONCENTRATION SUBSCALE 8   PDQ PROSPECTIVE MEMORY SUBSCALE 1   PDQ PLANNING/ORGANIZATION SUBSCALE 3   PDQ TOTAL SCORE 13   PDQ SCORE INTERPRETATION Scores can range from 0-80, with higher scores indicating greater perceived cognitive impairment.     MSSS SCORE & INTERPRETATION 9/22/2019   MSSS TANGIBLE SUPPORT SUBSCALE 68.75   MSSS EMOTIONAL/INFORMATIONAL SUPPORT SUBSCALE 75   MSSS AFFECTIONATE SUPPORT SUBSCALE 75   MSSS POSITIVE SOCIAL INTERACTION SUBSCALE 75   MSSS TOTAL SCORE 73.44   MSSS SCORE INTERPRETATION Scores can range from 0-100, with higher scores indicating greater perceived support.         Objective:      25 foot timed walk: 3.3s without assist  Neurologic Exam   MENTAL STATUS: grossly intact  CRANIAL NERVE EXAM: There is no internuclear ophthalmoplegia. Extraocular   muscles are intact.  No facial   asymmetry.There is no dysarthria.   MOTOR EXAM: Normal bulk and tone throughout UE and LE bilaterally. Rapid sequential movements are normal. Strength is 5/5 in all groups   in the lower extremities and upper extremities.   REFLEXES: Symmetric and 2+ throughout in all 4 extremities.   SENSORY EXAM: Normal to vibration t/o  COORDINATION: Normal finger-to-nose exam.   GAIT: Narrow based and stable.            Imaging:     Results for orders placed during the hospital encounter of 09/05/19   MRI Brain Demyelinating Without Contrast    Impression A white matter focus perpendicular to the right lateral ventricle and the right parietal lobe unchanged since the prior in this patient with a given history of multiple sclerosis.  No convincing detrimental changes are noted since the prior examination.      Electronically signed by: Ivan Su MD  Date:    09/05/2019  Time:    10:38       Labs:     Lab  Results   Component Value Date    IMVQXNGX62SL 80 03/27/2019    [id number] 61 04/18/2018    [id number] 51 03/23/2017       Sodium   Date Value Ref Range Status   12/14/2010 140 136 - 145 mMol/l Final     Potassium   Date Value Ref Range Status   12/14/2010 4.0 3.5 - 5.1 mMol/l Final     Chloride   Date Value Ref Range Status   12/14/2010 104 95 - 110 mMol/l Final     CO2   Date Value Ref Range Status   12/14/2010 24 23.0 - 29.0 mEq/L Final     Glucose   Date Value Ref Range Status   12/14/2010 90 70 - 110 mg/dl Final     BUN, Bld   Date Value Ref Range Status   12/14/2010 14 6 - 20 mg/dl Final     Creatinine   Date Value Ref Range Status   12/14/2010 0.9 0.5 - 1.4 mg/dl Final     Calcium   Date Value Ref Range Status   12/14/2010 9.7 8.7 - 10.5 mg/dl Final     Total Protein   Date Value Ref Range Status   01/14/2011 7.0 6.0 - 8.4 g/dL Final     Albumin   Date Value Ref Range Status   01/14/2011 4.4 3.5 - 5.2 g/dl Final     Total Bilirubin   Date Value Ref Range Status   01/14/2011 1.1 (H) 0.1 - 1.0 mg/dl Final     Comment:     For infants and newborns, interpretation of results should be based  on gestational age, weight and in agreement with clinical  observations.  .  Premature Infant recommended reference ranges:  Up to 24 hours.............<8.0 mg/dl  Up to 48 hours............<12.0 mg/dl  3-5 days..................<15.0 mg/dl  6-29 days.................<15.0 mg/dl     Alkaline Phosphatase   Date Value Ref Range Status   01/14/2011 87 55 - 135 U/L Final     AST   Date Value Ref Range Status   01/14/2011 23 10 - 40 U/L Final     ALT   Date Value Ref Range Status   01/14/2011 48 (H) 10 - 44 U/L Final     Anion Gap   Date Value Ref Range Status   12/14/2010 17 10 - 20 mmol/L Final     eGFR if    Date Value Ref Range Status   12/14/2010 >60 >60 mL/min Final     Comment:     Estimated glomerular filtration rate (eGFR) is normalized to an  average body surface area of 1.73 square meters.  The  calculation  used to obtain the eGFR is the adjusted MDRD equation, which factors  patient sex, age, race, and creatinine result.  Since race is unknown  in our information system, the eGFR values for -American  and Non--American patients are given for each creatinine  result.     eGFR if non    Date Value Ref Range Status   12/14/2010 >60 >60 mL/min Final         Diagnosis/Assessment/Plan:    1. Multiple Sclerosis  · Assessment: MING  · Imaging: annual MRI planned Sept 2020; Mitesh Barnhart preferred;   · Disease Modifying Therapies: continue glatiramer and vit D    2. MS Symptom Assessment / Management  · Pt has no MS related sx    F/u 6 mo Katrin Peskin CNS     Our visit today lasted 25 minutes, and 100% of this time was spent face to face with the patient. Over 50% of this visit included discussion of the treatment plan/medication changes/symptom management/exam findings/imaging results/coordination of care. The patient agrees with the plan of care.         Problem List Items Addressed This Visit        1 - High    Multiple sclerosis - Primary    Current Assessment & Plan     Continue Copaxone and vit D            Unprioritized    Prophylactic immunotherapy      Other Visit Diagnoses     Abnormal finding on MRI of brain        Counseling regarding goals of care

## 2019-10-01 DIAGNOSIS — N52.9 ERECTILE DYSFUNCTION, UNSPECIFIED ERECTILE DYSFUNCTION TYPE: ICD-10-CM

## 2019-10-01 RX ORDER — SILDENAFIL 100 MG/1
100 TABLET, FILM COATED ORAL DAILY PRN
Qty: 6 TABLET | Refills: 7 | Status: SHIPPED | OUTPATIENT
Start: 2019-10-01 | End: 2020-07-14

## 2019-10-16 ENCOUNTER — TELEPHONE (OUTPATIENT)
Dept: PHARMACY | Facility: CLINIC | Age: 44
End: 2019-10-16

## 2019-10-16 NOTE — TELEPHONE ENCOUNTER
Refill and follow-up call for Copaxone. Verified to ship on Monday 10/21 for delivery on Tuesday 10/22. $0.00 copay at 004.     Darin Blair, PharmD  Clinical Pharmacist  [facility]  P: [phone]

## 2019-11-13 ENCOUNTER — TELEPHONE (OUTPATIENT)
Dept: PHARMACY | Facility: CLINIC | Age: 44
End: 2019-11-13

## 2019-12-09 ENCOUNTER — PATIENT MESSAGE (OUTPATIENT)
Dept: NEUROLOGY | Facility: CLINIC | Age: 44
End: 2019-12-09

## 2019-12-09 DIAGNOSIS — R53.83 FATIGUE, UNSPECIFIED TYPE: ICD-10-CM

## 2019-12-09 DIAGNOSIS — G35 MULTIPLE SCLEROSIS: ICD-10-CM

## 2019-12-09 DIAGNOSIS — Z86.59 HISTORY OF ATTENTION DEFICIT DISORDER: ICD-10-CM

## 2019-12-09 RX ORDER — LISDEXAMFETAMINE DIMESYLATE 30 MG/1
30 CAPSULE ORAL EVERY MORNING
Qty: 90 CAPSULE | Refills: 0 | Status: SHIPPED | OUTPATIENT
Start: 2019-12-09 | End: 2020-03-24 | Stop reason: SDUPTHER

## 2019-12-12 ENCOUNTER — TELEPHONE (OUTPATIENT)
Dept: PHARMACY | Facility: CLINIC | Age: 44
End: 2019-12-12

## 2020-01-02 DIAGNOSIS — G35 MULTIPLE SCLEROSIS: ICD-10-CM

## 2020-01-06 RX ORDER — GLATIRAMER 40 MG/ML
40 INJECTION, SOLUTION SUBCUTANEOUS
Qty: 36 ML | Refills: 1 | Status: SHIPPED | OUTPATIENT
Start: 2020-01-06 | End: 2020-07-24 | Stop reason: SDUPTHER

## 2020-01-24 ENCOUNTER — TELEPHONE (OUTPATIENT)
Dept: PHARMACY | Facility: CLINIC | Age: 45
End: 2020-01-24

## 2020-01-27 NOTE — TELEPHONE ENCOUNTER
Refill readiness for Copaxone confirmed with patient; name/ confirmed; no missed doses; no new medications; no side effects noted; address confirmed for 2/3 shipment and  delivery. $0 copay. No sharps container needed. Patient states he has 5 doses remaining, not including today and he is injecting MWF    Calin Corbett, Pharm.D.  Pharmacy Resident, PGY-1   [facility]

## 2020-02-24 ENCOUNTER — TELEPHONE (OUTPATIENT)
Dept: PHARMACY | Facility: CLINIC | Age: 45
End: 2020-02-24

## 2020-03-23 ENCOUNTER — PATIENT MESSAGE (OUTPATIENT)
Dept: NEUROLOGY | Facility: CLINIC | Age: 45
End: 2020-03-23

## 2020-03-24 ENCOUNTER — OFFICE VISIT (OUTPATIENT)
Dept: NEUROLOGY | Facility: CLINIC | Age: 45
End: 2020-03-24
Payer: COMMERCIAL

## 2020-03-24 ENCOUNTER — TELEPHONE (OUTPATIENT)
Dept: NEUROLOGY | Facility: CLINIC | Age: 45
End: 2020-03-24

## 2020-03-24 DIAGNOSIS — Z29.89 PROPHYLACTIC IMMUNOTHERAPY: ICD-10-CM

## 2020-03-24 DIAGNOSIS — G35 MULTIPLE SCLEROSIS: Primary | ICD-10-CM

## 2020-03-24 DIAGNOSIS — Z71.89 COUNSELING REGARDING GOALS OF CARE: ICD-10-CM

## 2020-03-24 DIAGNOSIS — R53.83 FATIGUE, UNSPECIFIED TYPE: ICD-10-CM

## 2020-03-24 DIAGNOSIS — Z86.59 HISTORY OF ATTENTION DEFICIT DISORDER: ICD-10-CM

## 2020-03-24 PROCEDURE — 99213 OFFICE O/P EST LOW 20 MIN: CPT | Mod: 95,,, | Performed by: CLINICAL NURSE SPECIALIST

## 2020-03-24 PROCEDURE — 99213 PR OFFICE/OUTPT VISIT, EST, LEVL III, 20-29 MIN: ICD-10-PCS | Mod: 95,,, | Performed by: CLINICAL NURSE SPECIALIST

## 2020-03-24 RX ORDER — LISDEXAMFETAMINE DIMESYLATE 30 MG/1
30 CAPSULE ORAL EVERY MORNING
Qty: 30 CAPSULE | Refills: 0 | Status: SHIPPED | OUTPATIENT
Start: 2020-03-24 | End: 2020-04-23

## 2020-03-24 RX ORDER — LISDEXAMFETAMINE DIMESYLATE 30 MG/1
30 CAPSULE ORAL EVERY MORNING
Qty: 30 CAPSULE | Refills: 0 | Status: SHIPPED | OUTPATIENT
Start: 2020-04-23 | End: 2020-05-23

## 2020-03-24 RX ORDER — LISDEXAMFETAMINE DIMESYLATE 30 MG/1
30 CAPSULE ORAL EVERY MORNING
Qty: 30 CAPSULE | Refills: 0 | Status: SHIPPED | OUTPATIENT
Start: 2020-05-23 | End: 2020-09-30 | Stop reason: SDUPTHER

## 2020-03-24 NOTE — Clinical Note
Yessica,he needs MRI brain and c-spine in 6 months, along with Vit D. I will see him for a visit a few days later. Please schedule.

## 2020-03-24 NOTE — TELEPHONE ENCOUNTER
----- Message from RUSSEL Moreau, CNS sent at 3/24/2020 10:26 AM CDT -----  Yessica,he needs MRI brain and c-spine in 6 months, along with Vit D. I will see him for a visit a few days later. Please schedule.

## 2020-03-26 ENCOUNTER — TELEPHONE (OUTPATIENT)
Dept: PHARMACY | Facility: CLINIC | Age: 45
End: 2020-03-26

## 2020-03-26 NOTE — TELEPHONE ENCOUNTER
Refill call regarding Copaxone at [facility]. Will prepare for shipment with consent of patient on  to arrive . Copay 0.00. Patient has not started any new medications including OTC drugs. Patient has not had any medication/ dose or instruction changes. No new allergies or side effects reported with this shipment. Medication is being taken as prescribed by physician and properly stored. Two patient identifiers:  and Address verified. Next injection 3/27.Patient injects on MWF and has enough medication  on hand until 4/3. No sharps needed with this fill.

## 2020-04-14 ENCOUNTER — TELEPHONE (OUTPATIENT)
Dept: PHARMACY | Facility: CLINIC | Age: 45
End: 2020-04-14

## 2020-04-14 NOTE — TELEPHONE ENCOUNTER
"Patient is doing very well overall with his MS and Copaxone therapy. He states that he has no noticeable symptoms of MS, stating that "I'm not sure what they would be if they happened". Explained that MS symptoms can manifest differently from patient to patient, being either in the form of pain and spasms in their extremities and trunk. Symptoms can also be mental, with patients describing "mind fog" or other mental issues. Patient states that the only physical symptoms that he has noticed was when he tucks his head to his chin he will sometimes have some tingling on the outside of his legs. As for mental, patient does say that he notices that his vocabulary is not quite as expansive as it used to be. 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 Otherwise he does not notice any issues that affect his day to day. He has no pain associated with his MS (0/10). He states that he usually administers with his autoinjector and he does not have any injection site reactions. He has not started any new medications. He rates his QoL a 8/10. Will pend next follow up in 6 months.    Chin Dai, PharmD  Clinical Pharmacist  [facility]  P: [phone]    "

## 2020-04-21 DIAGNOSIS — Z01.84 ANTIBODY RESPONSE EXAMINATION: ICD-10-CM

## 2020-04-23 ENCOUNTER — TELEPHONE (OUTPATIENT)
Dept: PHARMACY | Facility: CLINIC | Age: 45
End: 2020-04-23

## 2020-04-23 NOTE — TELEPHONE ENCOUNTER
Refill call regarding Copaxone from [facility]. Patient reached and informed of copay of $0 @004. Patients next dose is scheduled for 5/4 shipping out 4/30 for 5/1 arrival with patients consent.

## 2020-04-28 ENCOUNTER — LAB VISIT (OUTPATIENT)
Dept: LAB | Facility: HOSPITAL | Age: 45
End: 2020-04-28
Attending: INTERNAL MEDICINE
Payer: COMMERCIAL

## 2020-04-28 DIAGNOSIS — Z01.84 ANTIBODY RESPONSE EXAMINATION: ICD-10-CM

## 2020-04-28 LAB — SARS-COV-2 IGG SERPL QL IA: NEGATIVE

## 2020-04-28 PROCEDURE — 86769 SARS-COV-2 COVID-19 ANTIBODY: CPT

## 2020-04-28 PROCEDURE — 36415 COLL VENOUS BLD VENIPUNCTURE: CPT | Mod: PO

## 2020-05-25 ENCOUNTER — TELEPHONE (OUTPATIENT)
Dept: PHARMACY | Facility: CLINIC | Age: 45
End: 2020-05-25

## 2020-05-25 NOTE — TELEPHONE ENCOUNTER
Contacted patient to see if he was ready to refill Copaxone 40 mg/mL #12/28. The patient injects every M-W-F, he has 4 doses on hand he injected his dose today.  Ship 06/01 for 06/02 delivery.  Verified address.  Copay $0.00 in 004.  Patient has not started any new medications including OTC drugs. Patient has not had any medication/ dose or instruction changes. No new allergies or side effects reported with this shipment. Medication is being taken as prescribed by physician and properly stored.  Declined [facility] . -JAVIER

## 2020-06-26 ENCOUNTER — TELEPHONE (OUTPATIENT)
Dept: PHARMACY | Facility: CLINIC | Age: 45
End: 2020-06-26

## 2020-06-26 NOTE — TELEPHONE ENCOUNTER
Refill call regarding Copaxone at [facility]. Will prepare for shipment with consent of patient on  to arrive 7/3. Copay 0.00. Patient has not started any new medications including OTC drugs. Patient has not had any medication/ dose or instruction changes. No new allergies or side effects reported with this shipment. Medication is being taken as prescribed by physician and properly stored. Two patient identifiers:  and Address verified. Patient has no questions or concerns for [facility]. Patient have 4 injections on hand with no sharps needed.

## 2020-07-24 ENCOUNTER — TELEPHONE (OUTPATIENT)
Dept: PHARMACY | Facility: CLINIC | Age: 45
End: 2020-07-24

## 2020-07-24 DIAGNOSIS — G35 MULTIPLE SCLEROSIS: ICD-10-CM

## 2020-07-24 RX ORDER — GLATIRAMER 40 MG/ML
40 INJECTION, SOLUTION SUBCUTANEOUS
Qty: 36 ML | Refills: 1 | Status: SHIPPED | OUTPATIENT
Start: 2020-07-24 | End: 2021-02-05 | Stop reason: SDUPTHER

## 2020-07-24 NOTE — TELEPHONE ENCOUNTER
Sent doctor office a refill authorization request for Copaxone. Will follow up with patient tomorrow to get a count on medication. This will give doctor office time to authorize refill.

## 2020-08-18 ENCOUNTER — OCCUPATIONAL HEALTH (OUTPATIENT)
Dept: URGENT CARE | Facility: CLINIC | Age: 45
End: 2020-08-18

## 2020-08-18 DIAGNOSIS — Z02.1 PRE-EMPLOYMENT EXAMINATION: Primary | ICD-10-CM

## 2020-08-18 PROCEDURE — 92552 AUDIOGRAM OCC MED: ICD-10-PCS | Mod: S$GLB,,, | Performed by: NURSE PRACTITIONER

## 2020-08-18 PROCEDURE — 92552 PURE TONE AUDIOMETRY AIR: CPT | Mod: S$GLB,,, | Performed by: NURSE PRACTITIONER

## 2020-08-25 ENCOUNTER — TELEPHONE (OUTPATIENT)
Dept: PHARMACY | Facility: CLINIC | Age: 45
End: 2020-08-25

## 2020-09-04 ENCOUNTER — TELEPHONE (OUTPATIENT)
Dept: PHARMACY | Facility: CLINIC | Age: 45
End: 2020-09-04

## 2020-09-24 ENCOUNTER — HOSPITAL ENCOUNTER (OUTPATIENT)
Dept: RADIOLOGY | Facility: HOSPITAL | Age: 45
Discharge: HOME | End: 2020-09-24
Attending: CLINICAL NURSE SPECIALIST
Payer: COMMERCIAL

## 2020-09-24 DIAGNOSIS — G35 MULTIPLE SCLEROSIS: ICD-10-CM

## 2020-09-24 PROCEDURE — 72141 MRI NECK SPINE W/O DYE: CPT | Mod: 26,,, | Performed by: RADIOLOGY

## 2020-09-24 PROCEDURE — 70551 MRI BRAIN STEM W/O DYE: CPT | Mod: 26,,, | Performed by: RADIOLOGY

## 2020-09-24 PROCEDURE — 70551 MRI BRAIN STEM W/O DYE: CPT | Mod: TC,PO

## 2020-09-24 PROCEDURE — 72141 MRI NECK SPINE W/O DYE: CPT | Mod: TC,PO

## 2020-09-24 PROCEDURE — 70551 MRI BRAIN DEMYELINATING WITHOUT CONTRAST: ICD-10-PCS | Mod: 26,,, | Performed by: RADIOLOGY

## 2020-09-24 PROCEDURE — 72141 MRI CERVICAL SPINE DEMYELINATING WITHOUT CONTRAST: ICD-10-PCS | Mod: 26,,, | Performed by: RADIOLOGY

## 2020-09-30 ENCOUNTER — PATIENT MESSAGE (OUTPATIENT)
Dept: NEUROLOGY | Facility: CLINIC | Age: 45
End: 2020-09-30

## 2020-09-30 ENCOUNTER — OFFICE VISIT (OUTPATIENT)
Dept: NEUROLOGY | Facility: CLINIC | Age: 45
End: 2020-09-30
Payer: COMMERCIAL

## 2020-09-30 VITALS
DIASTOLIC BLOOD PRESSURE: 86 MMHG | BODY MASS INDEX: 35.62 KG/M2 | WEIGHT: 248.81 LBS | TEMPERATURE: 97 F | HEIGHT: 70 IN | SYSTOLIC BLOOD PRESSURE: 137 MMHG | HEART RATE: 89 BPM

## 2020-09-30 DIAGNOSIS — Z86.59 HISTORY OF ATTENTION DEFICIT DISORDER: ICD-10-CM

## 2020-09-30 DIAGNOSIS — Z29.89 PROPHYLACTIC IMMUNOTHERAPY: ICD-10-CM

## 2020-09-30 DIAGNOSIS — G35 MULTIPLE SCLEROSIS: Primary | ICD-10-CM

## 2020-09-30 DIAGNOSIS — R53.83 FATIGUE, UNSPECIFIED TYPE: ICD-10-CM

## 2020-09-30 DIAGNOSIS — Z71.89 COUNSELING REGARDING GOALS OF CARE: ICD-10-CM

## 2020-09-30 PROCEDURE — 3008F PR BODY MASS INDEX (BMI) DOCUMENTED: ICD-10-PCS | Mod: CPTII,S$GLB,, | Performed by: CLINICAL NURSE SPECIALIST

## 2020-09-30 PROCEDURE — 99999 PR PBB SHADOW E&M-EST. PATIENT-LVL III: ICD-10-PCS | Mod: PBBFAC,,, | Performed by: CLINICAL NURSE SPECIALIST

## 2020-09-30 PROCEDURE — 3008F BODY MASS INDEX DOCD: CPT | Mod: CPTII,S$GLB,, | Performed by: CLINICAL NURSE SPECIALIST

## 2020-09-30 PROCEDURE — 99214 OFFICE O/P EST MOD 30 MIN: CPT | Mod: S$GLB,,, | Performed by: CLINICAL NURSE SPECIALIST

## 2020-09-30 PROCEDURE — 99999 PR PBB SHADOW E&M-EST. PATIENT-LVL III: CPT | Mod: PBBFAC,,, | Performed by: CLINICAL NURSE SPECIALIST

## 2020-09-30 PROCEDURE — 99214 PR OFFICE/OUTPT VISIT, EST, LEVL IV, 30-39 MIN: ICD-10-PCS | Mod: S$GLB,,, | Performed by: CLINICAL NURSE SPECIALIST

## 2020-09-30 RX ORDER — LISDEXAMFETAMINE DIMESYLATE 30 MG/1
30 CAPSULE ORAL EVERY MORNING
Qty: 30 CAPSULE | Refills: 0 | Status: SHIPPED | OUTPATIENT
Start: 2020-10-30 | End: 2020-11-28

## 2020-09-30 RX ORDER — LISDEXAMFETAMINE DIMESYLATE 30 MG/1
30 CAPSULE ORAL EVERY MORNING
Qty: 29 CAPSULE | Refills: 0 | Status: SHIPPED | OUTPATIENT
Start: 2020-11-29 | End: 2020-12-22 | Stop reason: SDUPTHER

## 2020-09-30 RX ORDER — LISDEXAMFETAMINE DIMESYLATE 30 MG/1
30 CAPSULE ORAL EVERY MORNING
Qty: 30 CAPSULE | Refills: 0 | Status: SHIPPED | OUTPATIENT
Start: 2020-09-30 | End: 2020-10-29

## 2020-09-30 NOTE — PROGRESS NOTES
Subjective:          Patient ID: Dory Raza is a 45 y.o. male who presents today for a routine clinic visit for MS.  He was last seen in March 2020 in a virtual visit. The history has been provided by the patient.     MS HPI:  · DMT: glatiramer acetate 40mg three times a week   · Side effects from DMT? No  · Taking vitamin D3 as recommended? Yes -  Dose: 5000 units M-F and 10,000 units on weekends   · He denies any significant new or different symptoms. He feels like he may have a harder time finding words than he used to. He used to have an expansive vocabulary, so this is more noticeable to him.   · He is not exercising as much as he would like. He thinks he may have gained a few pounds.     Medications:  Current Outpatient Medications   Medication Sig    cholecalciferol, vitamin D3, (VITAMIN D3) 2,000 unit Cap Take 1 capsule by mouth once daily. Take 6000 IU daily.    glatiramer (COPAXONE, GLATOPA) 40 mg/mL injection INJECT 40 MG INTO THE SKIN THREE TIMES A WEEK.    lisdexamfetamine (VYVANSE) 30 MG capsule Take 1 capsule (30 mg total) by mouth every morning.    multivitamin capsule Take 1 capsule by mouth once daily.    sildenafiL (VIAGRA) 100 MG tablet TAKE 1 TABLET (100 MG TOTAL) BY MOUTH DAILY AS NEEDED FOR ERECTILE DYSFUNCTION.       SOCIAL HISTORY  Social History     Tobacco Use    Smoking status: Former Smoker     Types: Cigarettes     Quit date: 2/18/2010     Years since quitting: 10.6    Smokeless tobacco: Never Used   Substance Use Topics    Alcohol use: Yes     Alcohol/week: 0.0 standard drinks     Comment: socially    Drug use: No     Living arrangements - the patient lives with his family. 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    ROS:  REVIEW OF SYMPTOMS 9/29/2020   Do you feel abnormally tired on most days? No--Vyvanse is helpful   Do you feel you generally sleep well? Yes   Do you have difficulty  controlling your bladder?  No--denies UTIs    Do you have difficulty controlling your bowels?  No   Do you have frequent muscle cramps, tightness or spasms in your limbs?  No   Do you have new visual symptoms?  No--wears glasses    Do you have worsening difficulty with your memory or thinking? No--vocabulary, as above   Do you have worsening symptoms of anxiety or depression?  No   For patients who walk, Do you have more difficulty walking?  Not Applicable   Have you fallen since your last visit?  No   For patients who use wheelchairs: Do you have any skin wounds or breakdown? Not Applicable   Do you have difficulty using your hands?  No; has a weird pain at the left elbow with flexion or extension; he does not feel like his strength is affected    Do you have shooting or burning pain? No   Do you have difficulty with sexual function?  Yes   If you are sexually active, are you using birth control? Y/N  N/A Not Applicable   Do you often choke when swallowing liquids or solid food?  No   Do you experience worsening symptoms when overheated? No   Do you need any new equipment such as a wheelchair, walker or shower chair? No   Do you receive co-pay financial assistance for your principal MS medicine? Yes   Would you be interested in participating in an MS research trial in the future? Yes   For patients on Gilenya, Tecfidera, Aubagio, Rituxan, Ocrevus, Tysabri, Lemtrada or Methotrexate, are you aware that you should NOT receive live virus vaccines?  Not Applicable   Do you feel you have adequate family/friend support?  Yes   Do you have health insurance?   Yes   Are you currently employed? Yes   Do you receive SSDI/SSI?  Not Applicable   Do you use marijuana or cannabis products? No   Have you been diagnosed with a urinary tract infection since your last visit here? No   Have you been diagnosed with a respiratory tract infection since your last visit here? No   Have you been to the emergency room since your last visit  here? No   Have you been hospitalized since your last visit here?  No            Objective:        1. 25 foot timed walk: 3.4; was 3.3 seconds in 2019    Neurologic Exam     Mental Status   Oriented to person, place, and time.   Attention: normal. Concentration: normal.   Speech: speech is normal   Level of consciousness: alert  Knowledge: good.   Normal comprehension.     Cranial Nerves     CN III, IV, VI   Pupils are equal, round, and reactive to light.  Extraocular motions are normal.   Right pupil: Shape: regular. Reactivity: brisk.   Left pupil: Shape: regular. Reactivity: brisk.   CN III: no CN III palsy  CN VI: no CN VI palsy  Nystagmus: none     CN V   Right facial sensation deficit: none  Left facial sensation deficit: none    CN VII   Right facial weakness: none  Left facial weakness: none    CN VIII   Hearing: intact    CN IX, X   Palate: symmetric    CN XI   Right sternocleidomastoid strength: normal  Left sternocleidomastoid strength: normal  Right trapezius strength: normal  Left trapezius strength: normal    CN XII   Tongue deviation: none    Motor Exam   Muscle bulk: normal  Overall muscle tone: normal  Right arm tone: normal  Left arm tone: normal  Right leg tone: normal  Left leg tone: normal    Strength   Right neck flexion: 5/5  Left neck flexion: 5/5  Right neck extension: 5/5  Left neck extension: 5/5  Right deltoid: 5/5  Left deltoid: 5/5  Right biceps: 5/5  Left biceps: 5/5  Right triceps: 5/5  Left triceps: 5/5  Right wrist flexion: 5/5  Left wrist flexion: 5/5  Right wrist extension: 5/5  Left wrist extension: 5/5  Right interossei: 5/5  Left interossei: 5/5  Right iliopsoas: 5/5  Left iliopsoas: 5/5  Right quadriceps: 5/5  Left quadriceps: 5/5  Right hamstrin/5  Left hamstrin/5  Right anterior tibial: 5/5  Left anterior tibial: 5/5  Right gastroc: 5/5  Left gastroc: 5/5    Sensory Exam   Right arm vibration: normal  Left arm vibration: normal  Right leg vibration: decreased from  toes (perhaps slight decrease in toes)  Left leg vibration: decreased from toes    Gait, Coordination, and Reflexes     Gait  Gait: normal    Coordination   Romberg: negative  Finger to nose coordination: normal  Heel to shin coordination: normal  Tandem walking coordination: normal    Tremor   Resting tremor: absent    Reflexes   Right brachioradialis: 2+  Left brachioradialis: 2+  Right biceps: 2+  Left biceps: 2+  Right triceps: 2+  Left triceps: 2+  Right patellar: 2+  Left patellar: 2+  Right achilles: 2+  Left achilles: 2+  He is able to hop on each foot ten times.   Normal rapid sequential movements in upper and lower extremities.      Imaging:     Results for orders placed during the hospital encounter of 09/24/20   MRI Brain Demyelinating Without Contrast    Impression 1. There is a stable single focus of FLAIR and T2 hyperintense signal located along the posterior right lateral ventricle periventricular white matter.  This has been present on multiple prior studies and is unchanged.  There are no new regions of signal abnormality in the brain.  There is no restricted diffusion.  Again, the single lesion is somewhat nonspecific but is consistent with the provided diagnosis of multiple sclerosis.  There is no evidence of interval progression of disease or active demyelination.      Electronically signed by: Abdirashid Barnes MD  Date:    09/24/2020  Time:    10:34     Results for orders placed during the hospital encounter of 09/24/20   MRI Cervical Spine Demyelinating Without Contrast    Impression 1. The axial images are motion degraded.  This somewhat limits evaluation of foraminal stenosis.  Otherwise, there is no fracture or malalignment.  There is no spinal stenosis or cord compression.  2. There are stable regions of abnormal signal intensity in the cord.  Again, the largest lesion is seen in the posterior central cord at the level of C3-4.  There is a 2nd punctate focus of abnormal signal in the  right posterior cord at the level of inferior C3.  These findings are consistent with the provided diagnosis of multiple sclerosis without obvious interval progression.  3. There is multilevel degenerative change.  There is however no significant spinal stenosis at any level.  4. There is mild-to-moderate left foraminal narrowing at the C3-4 level.  There is stable moderate left foraminal stenosis at the C4-5 level.  5. There may be mild bilateral foraminal narrowing at the C6-7 level.  Please see above discussion.      Electronically signed by: Abdirashid Barnes MD  Date:    09/24/2020  Time:    10:45   Images reviewed with the patient.   Labs:     Lab Results   Component Value Date    AKXOJECZ09LB 48 09/24/2020    [id number] 80 03/27/2019    [id number] 61 04/18/2018     Sodium   Date Value Ref Range Status   12/14/2010 140 136 - 145 mMol/l Final     Potassium   Date Value Ref Range Status   12/14/2010 4.0 3.5 - 5.1 mMol/l Final     Chloride   Date Value Ref Range Status   12/14/2010 104 95 - 110 mMol/l Final     CO2   Date Value Ref Range Status   12/14/2010 24 23.0 - 29.0 mEq/L Final     Glucose   Date Value Ref Range Status   12/14/2010 90 70 - 110 mg/dl Final     BUN, Bld   Date Value Ref Range Status   12/14/2010 14 6 - 20 mg/dl Final     Creatinine   Date Value Ref Range Status   12/14/2010 0.9 0.5 - 1.4 mg/dl Final     Calcium   Date Value Ref Range Status   12/14/2010 9.7 8.7 - 10.5 mg/dl Final     Total Protein   Date Value Ref Range Status   01/14/2011 7.0 6.0 - 8.4 g/dL Final     Albumin   Date Value Ref Range Status   01/14/2011 4.4 3.5 - 5.2 g/dl Final     Total Bilirubin   Date Value Ref Range Status   01/14/2011 1.1 (H) 0.1 - 1.0 mg/dl Final     Comment:     For infants and newborns, interpretation of results should be based  on gestational age, weight and in agreement with clinical  observations.  .  Premature Infant recommended reference ranges:  Up to 24 hours.............<8.0 mg/dl  Up to 48  hours............<12.0 mg/dl  3-5 days..................<15.0 mg/dl  6-29 days.................<15.0 mg/dl     Alkaline Phosphatase   Date Value Ref Range Status   01/14/2011 87 55 - 135 U/L Final     AST   Date Value Ref Range Status   01/14/2011 23 10 - 40 U/L Final     ALT   Date Value Ref Range Status   01/14/2011 48 (H) 10 - 44 U/L Final     Anion Gap   Date Value Ref Range Status   12/14/2010 17 10 - 20 mmol/L Final     eGFR if    Date Value Ref Range Status   12/14/2010 >60 >60 mL/min Final     Comment:     Estimated glomerular filtration rate (eGFR) is normalized to an  average body surface area of 1.73 square meters.  The calculation  used to obtain the eGFR is the adjusted MDRD equation, which factors  patient sex, age, race, and creatinine result.  Since race is unknown  in our information system, the eGFR values for -American  and Non--American patients are given for each creatinine  result.     eGFR if non    Date Value Ref Range Status   12/14/2010 >60 >60 mL/min Final     MS Impression and Plan:     NEURO MULTIPLE SCLEROSIS IMPRESSION:   MS Status:     Number of relapses in the past year?:  0    Clinical Progression:  Clinically Stable    MRI Progression:  Stable  Plan:     DMT:  No change in management    DMT comment:  Continue glatiramer and Vitamin D.    Symptom Management:  Implement change in symptom management ( )    Implement Change in Symptom Management:  Fatigue (Vyvanse prescriptions for October, November, and December sent to pharmacy.)      Our visit today lasted 30 minutes, and 100% of this time was spent face to face with the patient. Over 50% of this visit included discussion of the treatment plan/medications/symptom management/exam findings/imaging results/coordination of care. The patient agrees with the plan of care.    RUSSEL Cowan, CNS     Problem List Items Addressed This Visit        Neurologic Problems    Multiple sclerosis -  Primary    Relevant Medications    lisdexamfetamine (VYVANSE) 30 MG capsule (Start on 11/29/2020)    lisdexamfetamine (VYVANSE) 30 MG capsule (Start on 10/30/2020)    lisdexamfetamine (VYVANSE) 30 MG capsule       Other    Prophylactic immunotherapy      Other Visit Diagnoses     Counseling regarding goals of care        Fatigue, unspecified type        Relevant Medications    lisdexamfetamine (VYVANSE) 30 MG capsule (Start on 11/29/2020)    lisdexamfetamine (VYVANSE) 30 MG capsule (Start on 10/30/2020)    lisdexamfetamine (VYVANSE) 30 MG capsule    History of attention deficit disorder        Relevant Medications    lisdexamfetamine (VYVANSE) 30 MG capsule (Start on 11/29/2020)    lisdexamfetamine (VYVANSE) 30 MG capsule (Start on 10/30/2020)    lisdexamfetamine (VYVANSE) 30 MG capsule

## 2020-10-05 ENCOUNTER — TELEPHONE (OUTPATIENT)
Dept: PHARMACY | Facility: CLINIC | Age: 45
End: 2020-10-05

## 2020-10-14 ENCOUNTER — TELEPHONE (OUTPATIENT)
Dept: PHARMACY | Facility: CLINIC | Age: 45
End: 2020-10-14

## 2020-10-14 NOTE — TELEPHONE ENCOUNTER
Clinical follow-up re-assessment for Copaxone completed on 10/14. Reviewed medical conditions, allergies, and medications with no updates. NO CI or Dis identified. Reviewed goals of therapy. Patient declined consult on Copaxone. Copaxone 40 mg TIW (MWF) is appropriate for MS diagnosis. He is in no pain and rates QoL an 8/10. He has not missed any days of work or planned activities due to MS. No ER/UC visits in the past month. No missed doses or side effects. He uses a calendar for an alarm as an adherence tool. He states his wife and his daughters are his support system with MS. Reports correct administration, storage, and disposal. Rotating injection sites between arms, thighs, hips, and stomach. No MS symptoms or relapses or exacerbations. No other questions or concerns. Encouraged to call [facility] with any questions or when beginning a new medication. Therapy appropriate to continue. Re-assessment in 180 days. CONFIRMED-NAME AND     MRI : no Changes  Routine lab monitoring not required.

## 2020-11-02 ENCOUNTER — PATIENT MESSAGE (OUTPATIENT)
Dept: PSYCHIATRY | Facility: CLINIC | Age: 45
End: 2020-11-02

## 2020-11-07 ENCOUNTER — TELEPHONE (OUTPATIENT)
Dept: PHARMACY | Facility: CLINIC | Age: 45
End: 2020-11-07

## 2020-11-07 NOTE — TELEPHONE ENCOUNTER
Refill call regarding Copaxone from [facility]. Shipping out Copaxone on  for  arrival with patients consent. Copay of $0 @ 004. Address and  confirmed.

## 2020-11-09 ENCOUNTER — SPECIALTY PHARMACY (OUTPATIENT)
Dept: PHARMACY | Facility: CLINIC | Age: 45
End: 2020-11-09

## 2020-11-11 ENCOUNTER — SPECIALTY PHARMACY (OUTPATIENT)
Dept: PHARMACY | Facility: CLINIC | Age: 45
End: 2020-11-11

## 2020-12-04 ENCOUNTER — SPECIALTY PHARMACY (OUTPATIENT)
Dept: PHARMACY | Facility: CLINIC | Age: 45
End: 2020-12-04

## 2020-12-04 NOTE — TELEPHONE ENCOUNTER
Specialty Pharmacy - Refill Coordination    Specialty Medication Orders Linked to Encounter      Most Recent Value   Medication #1  glatiramer (COPAXONE, GLATOPA) 40 mg/mL injection (Order#[id number], Rx#[id number])          Refill Questions - Documented Responses      Most Recent Value   Relationship to patient of person spoken to?  Self   HIPAA/medical authority confirmed?  Yes   Any changes in contact preferences or allowed representatives?  No   Has the patient had any insurance changes?  No   Has the patient had any changes to specialty medication, dose, or instructions?  No   Has the patient started taking any new medications, herbals, or supplements?  No   Has the patient been diagnosed with any new medical conditions?  No   Does the patient have any new allergies to medications or foods?  No   Does the patient have any concerns about side effects?  No   Can the patient store medication/sharps container properly (at the correct temperature, away from children/pets, etc.)?  Yes   Can the patient call emergency services (911) in the event of an emergency?  Yes   Does the patient have any concerns or questions about taking or administering this medication as prescribed?  No   How many doses did the patient miss in the past 4 weeks or since the last fill?  0   How many doses does the patient have on hand?  3   How many days does the patient report on hand quantity will last?  10   Does the number of doses/days supply remaining match pharmacy expected amounts?  Yes   Does the patient feel that this medication is effective?  No   During the past 4 weeks, has patient missed any activities due to condition or medication?  No   During the past 4 weeks, did patient have any of the following urgent care visits?  None   How will the patient receive the medication?  Mail   When does the patient need to receive the medication?  12/14/20   Shipping Address  Home   Address in [facility] confirmed and updated if  neccessary?  Yes   Expected Copay ($)  0   Is the patient able to afford the medication copay?  Yes   Payment Method  zero copay   Days supply of Refill  28   Would patient like to speak to a pharmacist?  No   Do you want to trigger an intervention?  No   Do you want to trigger an additional referral task?  No   Refill activity completed?  Yes   Refill activity plan  Refill scheduled   Shipment/Pickup Date:  12/10/20          Current Outpatient Medications   Medication Sig    cholecalciferol, vitamin D3, (VITAMIN D3) 2,000 unit Cap Take 1 capsule by mouth once daily. Take 6000 IU daily.    glatiramer (COPAXONE, GLATOPA) 40 mg/mL injection INJECT 40 MG INTO THE SKIN THREE TIMES A WEEK.    lisdexamfetamine (VYVANSE) 30 MG capsule Take 1 capsule (30 mg total) by mouth every morning.    multivitamin capsule Take 1 capsule by mouth once daily.    sildenafiL (VIAGRA) 100 MG tablet TAKE 1 TABLET (100 MG TOTAL) BY MOUTH DAILY AS NEEDED FOR ERECTILE DYSFUNCTION.   Last reviewed on 9/30/2020  1:16 PM by RUSSEL Moreau, CNS    Review of patient's allergies indicates:   Allergen Reactions    Proamatine [midodrine] Other (See Comments)     Vasectomy    Last reviewed on  10/14/2020 10:53 AM by Alex Zuniga      Tasks added this encounter   1/1/2021 - Refill Call (Auto Added)   Tasks due within next 3 months   No tasks due.     Tracie Hale  [facility]  [address] [place] [id number]  Phone: [phone]  Fax: [phone]

## 2020-12-22 ENCOUNTER — OFFICE VISIT (OUTPATIENT)
Dept: NEUROLOGY | Facility: CLINIC | Age: 45
End: 2020-12-22
Payer: COMMERCIAL

## 2020-12-22 ENCOUNTER — PATIENT MESSAGE (OUTPATIENT)
Dept: NEUROLOGY | Facility: CLINIC | Age: 45
End: 2020-12-22

## 2020-12-22 DIAGNOSIS — R53.83 FATIGUE, UNSPECIFIED TYPE: ICD-10-CM

## 2020-12-22 DIAGNOSIS — G35 MULTIPLE SCLEROSIS: Primary | ICD-10-CM

## 2020-12-22 DIAGNOSIS — Z86.59 HISTORY OF ATTENTION DEFICIT DISORDER: ICD-10-CM

## 2020-12-22 DIAGNOSIS — Z71.89 COUNSELING REGARDING GOALS OF CARE: ICD-10-CM

## 2020-12-22 PROCEDURE — 99212 OFFICE O/P EST SF 10 MIN: CPT | Mod: 95,,, | Performed by: CLINICAL NURSE SPECIALIST

## 2020-12-22 PROCEDURE — 99212 PR OFFICE/OUTPT VISIT, EST, LEVL II, 10-19 MIN: ICD-10-PCS | Mod: 95,,, | Performed by: CLINICAL NURSE SPECIALIST

## 2020-12-22 RX ORDER — LISDEXAMFETAMINE DIMESYLATE 30 MG/1
30 CAPSULE ORAL EVERY MORNING
Qty: 30 CAPSULE | Refills: 0 | Status: SHIPPED | OUTPATIENT
Start: 2021-01-28 | End: 2021-02-26

## 2020-12-22 RX ORDER — LISDEXAMFETAMINE DIMESYLATE 30 MG/1
30 CAPSULE ORAL EVERY MORNING
Qty: 30 CAPSULE | Refills: 0 | Status: SHIPPED | OUTPATIENT
Start: 2020-12-29 | End: 2021-01-27

## 2020-12-22 RX ORDER — LISDEXAMFETAMINE DIMESYLATE 30 MG/1
30 CAPSULE ORAL EVERY MORNING
Qty: 30 CAPSULE | Refills: 0 | Status: SHIPPED | OUTPATIENT
Start: 2021-02-27 | End: 2021-03-24

## 2020-12-22 NOTE — PROGRESS NOTES
Subjective:          Patient ID: Dory Raza is a 45 y.o. male who presents today for a routine virtual visit for his Vyvanse refill.     The patient location is: his workplace   The chief complaint leading to consultation is: MS/Vyvanse refill    Visit type: audiovisual    Face to Face time with patient: 5  10 minutes of total time spent on the encounter, which includes face to face time and non-face to face time preparing to see the patient (eg, review of tests), Obtaining and/or reviewing separately obtained history, Documenting clinical information in the electronic or other health record, Independently interpreting results (not separately reported) and communicating results to the patient/family/caregiver, or Care coordination (not separately reported).     Each patient to whom he or she provides medical services by telemedicine is:  (1) informed of the relationship between the physician and patient and the respective role of any other health care provider with respect to management of the patient; and (2) notified that he or she may decline to receive medical services by telemedicine and may withdraw from such care at any time.    MS HPI:  · DMT: glatiramer acetate  · Side effects from DMT? No  · Taking vitamin D3 as recommended? Yes   · He denies any new or different MS symptoms.   · He thinks he has left hip sciatica. He has been stretching and using TENS unit and Tylenol. He has had this in the past. He will let me know if he wants to do PT.   · He denies any negative side effects from the Vyvanse--no heart palpitations or insomnia.   · He feels like the Vyvanse dose is helpful in keeping him alert and not fatigued.   · He does take breaks from Vyvanse on off days if he can.  · He denies any issues with sleep. He denies any depression or anxiety.   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     Medications:  Current Outpatient Medications   Medication  Sig    cholecalciferol, vitamin D3, (VITAMIN D3) 2,000 unit Cap Take 1 capsule by mouth once daily. Take 6000 IU daily.    glatiramer (COPAXONE, GLATOPA) 40 mg/mL injection INJECT 40 MG INTO THE SKIN THREE TIMES A WEEK.    lisdexamfetamine (VYVANSE) 30 MG capsule Take 1 capsule (30 mg total) by mouth every morning.    multivitamin capsule Take 1 capsule by mouth once daily.    sildenafiL (VIAGRA) 100 MG tablet TAKE 1 TABLET (100 MG TOTAL) BY MOUTH DAILY AS NEEDED FOR ERECTILE DYSFUNCTION.       SOCIAL HISTORY  Social History     Tobacco Use    Smoking status: Former Smoker     Types: Cigarettes     Quit date: 2/18/2010     Years since quitting: 10.8    Smokeless tobacco: Never Used   Substance Use Topics    Alcohol use: Yes     Alcohol/week: 0.0 standard drinks     Comment: socially    Drug use: No       Living arrangements - the patient lives with his family.    ROS:  REVIEW OF SYMPTOMS 12/22/20   Do you feel abnormally tired on most days? No   Do you feel you generally sleep well? Yes--sleeping well at night    Do you have difficulty controlling your bladder?  No   Do you have difficulty controlling your bowels?  No   Do you have frequent muscle cramps, tightness or spasms in your limbs?  No   Do you have new visual symptoms?  No--wears glasses; sees eye doctor tomorrow    Do you have worsening difficulty with your memory or thinking? No--denies    Do you have worsening symptoms of anxiety or depression?  No   For patients who walk, Do you have more difficulty walking?  No   Have you fallen since your last visit?  No   For patients who use wheelchairs: Do you have any skin wounds or breakdown? Not Applicable   Do you have difficulty using your hands?  No   Do you have shooting or burning pain? No--sciatica    Do you have difficulty with sexual function?  Yes   If you are sexually active, are you using birth control? Y/N  N/A No   Do you often choke when swallowing liquids or solid food?  No   Do you  experience worsening symptoms when overheated? No       Do you receive co-pay financial assistance for your principal MS medicine? Yes           Do you feel you have adequate family/friend support?  Yes   Do you have health insurance?   Yes   Are you currently employed? Yes   Do you receive SSDI/SSI?  Not Applicable   Do you use marijuana or cannabis products? No   Have you been diagnosed with a urinary tract infection since your last visit here? No   Have you been diagnosed with a respiratory tract infection since your last visit here? No   Have you been to the emergency room since your last visit here? No   Have you been hospitalized since your last visit here?  No            Objective:        Neurologic Exam deferred today       Imaging:     Results for orders placed during the hospital encounter of 09/24/20   MRI Brain Demyelinating Without Contrast    Impression 1. There is a stable single focus of FLAIR and T2 hyperintense signal located along the posterior right lateral ventricle periventricular white matter.  This has been present on multiple prior studies and is unchanged.  There are no new regions of signal abnormality in the brain.  There is no restricted diffusion.  Again, the single lesion is somewhat nonspecific but is consistent with the provided diagnosis of multiple sclerosis.  There is no evidence of interval progression of disease or active demyelination.      Electronically signed by: Abdirashid Barnes MD  Date:    09/24/2020  Time:    10:34     Results for orders placed during the hospital encounter of 09/24/20   MRI Cervical Spine Demyelinating Without Contrast    Impression 1. The axial images are motion degraded.  This somewhat limits evaluation of foraminal stenosis.  Otherwise, there is no fracture or malalignment.  There is no spinal stenosis or cord compression.  2. There are stable regions of abnormal signal intensity in the cord.  Again, the largest lesion is seen in the posterior  central cord at the level of C3-4.  There is a 2nd punctate focus of abnormal signal in the right posterior cord at the level of inferior C3.  These findings are consistent with the provided diagnosis of multiple sclerosis without obvious interval progression.  3. There is multilevel degenerative change.  There is however no significant spinal stenosis at any level.  4. There is mild-to-moderate left foraminal narrowing at the C3-4 level.  There is stable moderate left foraminal stenosis at the C4-5 level.  5. There may be mild bilateral foraminal narrowing at the C6-7 level.  Please see above discussion.      Electronically signed by: Abdirashid Barnes MD  Date:    09/24/2020  Time:    10:45       Labs:     Lab Results   Component Value Date    [id number] 48 09/24/2020    [id number] 80 03/27/2019    [id number] 61 04/18/2018     Sodium   Date Value Ref Range Status   12/14/2010 140 136 - 145 mMol/l Final     Potassium   Date Value Ref Range Status   12/14/2010 4.0 3.5 - 5.1 mMol/l Final     Chloride   Date Value Ref Range Status   12/14/2010 104 95 - 110 mMol/l Final     CO2   Date Value Ref Range Status   12/14/2010 24 23.0 - 29.0 mEq/L Final     Glucose   Date Value Ref Range Status   12/14/2010 90 70 - 110 mg/dl Final     BUN   Date Value Ref Range Status   12/14/2010 14 6 - 20 mg/dl Final     Creatinine   Date Value Ref Range Status   12/14/2010 0.9 0.5 - 1.4 mg/dl Final     Calcium   Date Value Ref Range Status   12/14/2010 9.7 8.7 - 10.5 mg/dl Final     Total Protein   Date Value Ref Range Status   01/14/2011 7.0 6.0 - 8.4 g/dL Final     Albumin   Date Value Ref Range Status   01/14/2011 4.4 3.5 - 5.2 g/dl Final     Total Bilirubin   Date Value Ref Range Status   01/14/2011 1.1 (H) 0.1 - 1.0 mg/dl Final     Comment:     For infants and newborns, interpretation of results should be based  on gestational age, weight and in agreement with clinical  observations.  .  Premature Infant recommended reference  ranges:  Up to 24 hours.............<8.0 mg/dl  Up to 48 hours............<12.0 mg/dl  3-5 days..................<15.0 mg/dl  6-29 days.................<15.0 mg/dl     Alkaline Phosphatase   Date Value Ref Range Status   01/14/2011 87 55 - 135 U/L Final     AST   Date Value Ref Range Status   01/14/2011 23 10 - 40 U/L Final     ALT   Date Value Ref Range Status   01/14/2011 48 (H) 10 - 44 U/L Final     Anion Gap   Date Value Ref Range Status   12/14/2010 17 10 - 20 mmol/L Final     eGFR if    Date Value Ref Range Status   12/14/2010 >60 >60 mL/min Final     Comment:     Estimated glomerular filtration rate (eGFR) is normalized to an  average body surface area of 1.73 square meters.  The calculation  used to obtain the eGFR is the adjusted MDRD equation, which factors  patient sex, age, race, and creatinine result.  Since race is unknown  in our information system, the eGFR values for -American  and Non--American patients are given for each creatinine  result.     eGFR if non    Date Value Ref Range Status   12/14/2010 >60 >60 mL/min Final         Plan:      Vyvanse 30mg refilled for the next 3 months. Additional refills will be given during his March appt with Dr. Hernandez.       Katrin Sesay, APRN, CNS     Problem List Items Addressed This Visit        Neurologic Problems    Multiple sclerosis - Primary    Relevant Medications    lisdexamfetamine (VYVANSE) 30 MG capsule (Start on 2/27/2021)    lisdexamfetamine (VYVANSE) 30 MG capsule (Start on 1/28/2021)    lisdexamfetamine (VYVANSE) 30 MG capsule (Start on 12/29/2020)      Other Visit Diagnoses     Fatigue, unspecified type        Relevant Medications    lisdexamfetamine (VYVANSE) 30 MG capsule (Start on 2/27/2021)    lisdexamfetamine (VYVANSE) 30 MG capsule (Start on 1/28/2021)    lisdexamfetamine (VYVANSE) 30 MG capsule (Start on 12/29/2020)    History of attention deficit disorder        Relevant Medications     lisdexamfetamine (VYVANSE) 30 MG capsule (Start on 2/27/2021)    lisdexamfetamine (VYVANSE) 30 MG capsule (Start on 1/28/2021)    lisdexamfetamine (VYVANSE) 30 MG capsule (Start on 12/29/2020)

## 2020-12-23 ENCOUNTER — OFFICE VISIT (OUTPATIENT)
Dept: OPTOMETRY | Facility: CLINIC | Age: 45
End: 2020-12-23
Payer: COMMERCIAL

## 2020-12-23 DIAGNOSIS — H52.203 MYOPIA WITH ASTIGMATISM AND PRESBYOPIA, BILATERAL: ICD-10-CM

## 2020-12-23 DIAGNOSIS — Z01.00 EXAMINATION OF EYES AND VISION: Primary | ICD-10-CM

## 2020-12-23 DIAGNOSIS — H43.393 VITREOUS FLOATERS, BILATERAL: ICD-10-CM

## 2020-12-23 DIAGNOSIS — H52.4 MYOPIA WITH ASTIGMATISM AND PRESBYOPIA, BILATERAL: ICD-10-CM

## 2020-12-23 DIAGNOSIS — G35 MULTIPLE SCLEROSIS: ICD-10-CM

## 2020-12-23 DIAGNOSIS — H52.13 MYOPIA WITH ASTIGMATISM AND PRESBYOPIA, BILATERAL: ICD-10-CM

## 2020-12-23 PROCEDURE — 92015 PR REFRACTION: ICD-10-PCS | Mod: S$GLB,,, | Performed by: OPTOMETRIST

## 2020-12-23 PROCEDURE — 99999 PR PBB SHADOW E&M-EST. PATIENT-LVL III: CPT | Mod: PBBFAC,,, | Performed by: OPTOMETRIST

## 2020-12-23 PROCEDURE — 92015 DETERMINE REFRACTIVE STATE: CPT | Mod: S$GLB,,, | Performed by: OPTOMETRIST

## 2020-12-23 PROCEDURE — 1126F AMNT PAIN NOTED NONE PRSNT: CPT | Mod: S$GLB,,, | Performed by: OPTOMETRIST

## 2020-12-23 PROCEDURE — 92014 COMPRE OPH EXAM EST PT 1/>: CPT | Mod: S$GLB,,, | Performed by: OPTOMETRIST

## 2020-12-23 PROCEDURE — 99999 PR PBB SHADOW E&M-EST. PATIENT-LVL III: ICD-10-PCS | Mod: PBBFAC,,, | Performed by: OPTOMETRIST

## 2020-12-23 PROCEDURE — 92014 PR EYE EXAM, EST PATIENT,COMPREHESV: ICD-10-PCS | Mod: S$GLB,,, | Performed by: OPTOMETRIST

## 2020-12-23 PROCEDURE — 1126F PR PAIN SEVERITY QUANTIFIED, NO PAIN PRESENT: ICD-10-PCS | Mod: S$GLB,,, | Performed by: OPTOMETRIST

## 2020-12-23 NOTE — PATIENT INSTRUCTIONS
"DRY EYES -- BURNING OR IGNACIO SYMPTOMS:  Use Over The Counter artificial tears as needed for dry eye symptoms.   Some common brands include:  Systane, Optive, Refresh, and Thera-Tears.  These drops can be used as frequently as desired, but may be most helpful use during long periods of concentrated work.  For example, reading / working at the computer. Start with 3-4x per day.     Nighttime Ophthalmic gel or ointments are available: Refresh PM, Genteal, and Lacrilube.    Avoid drops that "get redness out" (Visine, Murine, Clear Eyes), as these may contain medication that could further irritate the eyes, especially with chronic use.    ALLERGY EYES -- ITCHING SYMPTOMS:  Over the counter medications include--Pataday, Zaditor, and Alaway.  Use as directed 1-2 drops daily for symptoms of itching / watering eyes.  These drops will not help for dry eye or exposure symptoms.    REDNESS RELIEF:  Lumify---is a good redness reliever that will not cause irritation if used chronically.         FLASHES / FLOATERS / POSTERIOR VITREOUS DETACHMENT    Call the clinic if you have any further changes in symptoms.  Including:  Increased numbers of floaters or flashing lights, dimness or darkness that moves through or stays constant in your vision, or any pain in the eye (s).    You may sometimes see small specks or clouds moving in your field of vision.  They are called FLOATERS.  You can often see them when looking at a plain background, like a blank wall or blue amanda.  Floaters are actually tiny clumps of gel or cells inside the VITREOUS, the clear jelly-like fluid that fills the inside of your eye.    While these objects look like they are in front of your eye, they are actually floating inside.  What you see are the shadows they cast on the RETINA, the nerve layer at the back of the eye that senses light and allows you to see.      POSTERIOR VITREOUS DETACHMENT    The appearance of new floaters may be alarming.  If you suddenly " develop new floaters, you should contact your eye care professional  right away.    The retina can tear if the shrinking vitreous pulls away from the wall of the eye.  This sometimes causes a small amount of bleeding in the eye that may appear as new floaters.    A torn retina is always a serious problem, since it can lead to a retinal detachment.  You should see your eye care professional as soon as possible if:     even one new floater appears suddenly;   you see sudden flashes of light;   you notice other symptoms, like the loss of side vision, or a curtain closes down in your vision        POSTERIOR VITREOUS DETACHMENT is more common for people who:     are nearsighted;   have had cataract surgery;   have had YAG laser surgery of the eye;   have had inflammation inside the eye;   are over age 60.      While some floaters may remain visible, many of them will fade over time and become less noticeable.  Even if you've had some floaters for years, you should have your eyes checked as soon as possible if you notice new ones.    FLASHING LIGHTS    When the vitreous gel rubs or pulls on the retina, you may see what look like flashing lights or lightning streaks.  These flashes can appear off and on for several weeks or months.      Some people experience flashes of light that appear as jagged lines or heat waves in both eyes, lasting 10-20 minutes.  These flashes are caused by a spasm of blood vessels in the brain, which is called a migraine.    If a headache follows these flashes, it's called a migraine headache.  If   no headache occurs, these flashes are called Ophthalmic or Ocular Migraine.

## 2020-12-23 NOTE — PROGRESS NOTES
HPI     Routine eye exam-dle-6/12/19    Pt complains of slight blurred vision at near. Needing updated glasses rx.   Denies any eye pain. Some floaters. No flashes.     Last edited by Genesis Eugene on 12/23/2020  1:39 PM. (History)        ROS     Positive for: Eyes    Negative for: Constitutional, Gastrointestinal, Neurological, Skin,   Genitourinary, Musculoskeletal, HENT, Endocrine, Cardiovascular,   Respiratory, Psychiatric, Allergic/Imm, Heme/Lymph    Last edited by MIGUEL ÁNGEL Melo, OD on 12/23/2020  2:13 PM. (History)        Assessment /Plan     For exam results, see Encounter Report.    Examination of eyes and vision    Myopia with astigmatism and presbyopia, bilateral    Vitreous floaters, bilateral    Multiple sclerosis      1. Ocular health exam OU  2. Updated specs rx, gave copy  Discussed presbyopia, near add vs svl and options  3. Mild OU, RD precautions given and reviewed. Patient knows to call/ message if any further changes in symptoms occur.  4. No new ocular manifestations    Discussed and educated patient on current findings /plan.  RTC 1 year, prn if any changes / issues

## 2021-01-04 ENCOUNTER — SPECIALTY PHARMACY (OUTPATIENT)
Dept: PHARMACY | Facility: CLINIC | Age: 46
End: 2021-01-04

## 2021-02-05 ENCOUNTER — PATIENT MESSAGE (OUTPATIENT)
Dept: NEUROLOGY | Facility: CLINIC | Age: 46
End: 2021-02-05

## 2021-02-08 DIAGNOSIS — G35 MULTIPLE SCLEROSIS: ICD-10-CM

## 2021-02-08 RX ORDER — GLATIRAMER 40 MG/ML
40 INJECTION, SOLUTION SUBCUTANEOUS
Qty: 36 ML | Refills: 1 | Status: SHIPPED | OUTPATIENT
Start: 2021-02-08 | End: 2021-07-27 | Stop reason: SDUPTHER

## 2021-02-09 ENCOUNTER — SPECIALTY PHARMACY (OUTPATIENT)
Dept: PHARMACY | Facility: CLINIC | Age: 46
End: 2021-02-09

## 2021-03-04 ENCOUNTER — PATIENT MESSAGE (OUTPATIENT)
Dept: PHARMACY | Facility: CLINIC | Age: 46
End: 2021-03-04

## 2021-03-09 ENCOUNTER — SPECIALTY PHARMACY (OUTPATIENT)
Dept: PHARMACY | Facility: CLINIC | Age: 46
End: 2021-03-09

## 2021-03-24 ENCOUNTER — OFFICE VISIT (OUTPATIENT)
Dept: NEUROLOGY | Facility: CLINIC | Age: 46
End: 2021-03-24
Payer: COMMERCIAL

## 2021-03-24 VITALS
BODY MASS INDEX: 35.9 KG/M2 | HEIGHT: 70 IN | HEART RATE: 76 BPM | DIASTOLIC BLOOD PRESSURE: 96 MMHG | TEMPERATURE: 98 F | SYSTOLIC BLOOD PRESSURE: 149 MMHG | WEIGHT: 250.75 LBS

## 2021-03-24 DIAGNOSIS — Z29.89 PROPHYLACTIC IMMUNOTHERAPY: ICD-10-CM

## 2021-03-24 DIAGNOSIS — G35 MULTIPLE SCLEROSIS: Primary | ICD-10-CM

## 2021-03-24 DIAGNOSIS — Z86.59 HISTORY OF ATTENTION DEFICIT DISORDER: ICD-10-CM

## 2021-03-24 DIAGNOSIS — N52.9 ERECTILE DYSFUNCTION, UNSPECIFIED ERECTILE DYSFUNCTION TYPE: ICD-10-CM

## 2021-03-24 DIAGNOSIS — Z71.89 COUNSELING REGARDING GOALS OF CARE: ICD-10-CM

## 2021-03-24 DIAGNOSIS — R53.83 FATIGUE, UNSPECIFIED TYPE: ICD-10-CM

## 2021-03-24 PROCEDURE — 99215 PR OFFICE/OUTPT VISIT, EST, LEVL V, 40-54 MIN: ICD-10-PCS | Mod: S$GLB,,, | Performed by: PSYCHIATRY & NEUROLOGY

## 2021-03-24 PROCEDURE — 99999 PR PBB SHADOW E&M-EST. PATIENT-LVL III: ICD-10-PCS | Mod: PBBFAC,,, | Performed by: PSYCHIATRY & NEUROLOGY

## 2021-03-24 PROCEDURE — 3008F BODY MASS INDEX DOCD: CPT | Mod: CPTII,S$GLB,, | Performed by: PSYCHIATRY & NEUROLOGY

## 2021-03-24 PROCEDURE — 1126F AMNT PAIN NOTED NONE PRSNT: CPT | Mod: S$GLB,,, | Performed by: PSYCHIATRY & NEUROLOGY

## 2021-03-24 PROCEDURE — 99215 OFFICE O/P EST HI 40 MIN: CPT | Mod: S$GLB,,, | Performed by: PSYCHIATRY & NEUROLOGY

## 2021-03-24 PROCEDURE — 3008F PR BODY MASS INDEX (BMI) DOCUMENTED: ICD-10-PCS | Mod: CPTII,S$GLB,, | Performed by: PSYCHIATRY & NEUROLOGY

## 2021-03-24 PROCEDURE — 99999 PR PBB SHADOW E&M-EST. PATIENT-LVL III: CPT | Mod: PBBFAC,,, | Performed by: PSYCHIATRY & NEUROLOGY

## 2021-03-24 PROCEDURE — 1126F PR PAIN SEVERITY QUANTIFIED, NO PAIN PRESENT: ICD-10-PCS | Mod: S$GLB,,, | Performed by: PSYCHIATRY & NEUROLOGY

## 2021-03-24 RX ORDER — LISDEXAMFETAMINE DIMESYLATE 40 MG/1
40 CAPSULE ORAL EVERY MORNING
Qty: 30 CAPSULE | Refills: 0 | Status: SHIPPED | OUTPATIENT
Start: 2021-05-28 | End: 2021-08-04 | Stop reason: SDUPTHER

## 2021-03-24 RX ORDER — LISDEXAMFETAMINE DIMESYLATE 40 MG/1
40 CAPSULE ORAL EVERY MORNING
Qty: 30 CAPSULE | Refills: 0 | Status: SHIPPED | OUTPATIENT
Start: 2021-04-28 | End: 2021-05-27

## 2021-03-24 RX ORDER — LISDEXAMFETAMINE DIMESYLATE 40 MG/1
40 CAPSULE ORAL EVERY MORNING
Qty: 30 CAPSULE | Refills: 0 | Status: SHIPPED | OUTPATIENT
Start: 2021-03-29 | End: 2021-04-27

## 2021-04-06 ENCOUNTER — SPECIALTY PHARMACY (OUTPATIENT)
Dept: PHARMACY | Facility: CLINIC | Age: 46
End: 2021-04-06

## 2021-04-13 ENCOUNTER — PATIENT MESSAGE (OUTPATIENT)
Dept: UROLOGY | Facility: CLINIC | Age: 46
End: 2021-04-13

## 2021-04-20 ENCOUNTER — OFFICE VISIT (OUTPATIENT)
Dept: UROLOGY | Facility: CLINIC | Age: 46
End: 2021-04-20
Payer: COMMERCIAL

## 2021-04-20 ENCOUNTER — LAB VISIT (OUTPATIENT)
Dept: LAB | Facility: HOSPITAL | Age: 46
End: 2021-04-20
Payer: COMMERCIAL

## 2021-04-20 ENCOUNTER — PATIENT MESSAGE (OUTPATIENT)
Dept: UROLOGY | Facility: CLINIC | Age: 46
End: 2021-04-20

## 2021-04-20 VITALS
WEIGHT: 253.75 LBS | DIASTOLIC BLOOD PRESSURE: 95 MMHG | HEIGHT: 70 IN | HEART RATE: 99 BPM | BODY MASS INDEX: 36.33 KG/M2 | SYSTOLIC BLOOD PRESSURE: 140 MMHG

## 2021-04-20 DIAGNOSIS — G35 MS (MULTIPLE SCLEROSIS): Primary | ICD-10-CM

## 2021-04-20 DIAGNOSIS — N52.9 ERECTILE DYSFUNCTION, UNSPECIFIED ERECTILE DYSFUNCTION TYPE: ICD-10-CM

## 2021-04-20 LAB — TESTOST SERPL-MCNC: 383 NG/DL (ref 304–1227)

## 2021-04-20 PROCEDURE — 3008F BODY MASS INDEX DOCD: CPT | Mod: CPTII,S$GLB,, | Performed by: NURSE PRACTITIONER

## 2021-04-20 PROCEDURE — 84403 ASSAY OF TOTAL TESTOSTERONE: CPT | Performed by: NURSE PRACTITIONER

## 2021-04-20 PROCEDURE — 99999 PR PBB SHADOW E&M-EST. PATIENT-LVL IV: ICD-10-PCS | Mod: PBBFAC,,, | Performed by: NURSE PRACTITIONER

## 2021-04-20 PROCEDURE — 1126F PR PAIN SEVERITY QUANTIFIED, NO PAIN PRESENT: ICD-10-PCS | Mod: S$GLB,,, | Performed by: NURSE PRACTITIONER

## 2021-04-20 PROCEDURE — 36415 COLL VENOUS BLD VENIPUNCTURE: CPT | Performed by: NURSE PRACTITIONER

## 2021-04-20 PROCEDURE — 99203 PR OFFICE/OUTPT VISIT, NEW, LEVL III, 30-44 MIN: ICD-10-PCS | Mod: S$GLB,,, | Performed by: NURSE PRACTITIONER

## 2021-04-20 PROCEDURE — 3008F PR BODY MASS INDEX (BMI) DOCUMENTED: ICD-10-PCS | Mod: CPTII,S$GLB,, | Performed by: NURSE PRACTITIONER

## 2021-04-20 PROCEDURE — 1126F AMNT PAIN NOTED NONE PRSNT: CPT | Mod: S$GLB,,, | Performed by: NURSE PRACTITIONER

## 2021-04-20 PROCEDURE — 99999 PR PBB SHADOW E&M-EST. PATIENT-LVL IV: CPT | Mod: PBBFAC,,, | Performed by: NURSE PRACTITIONER

## 2021-04-20 PROCEDURE — 99203 OFFICE O/P NEW LOW 30 MIN: CPT | Mod: S$GLB,,, | Performed by: NURSE PRACTITIONER

## 2021-04-20 RX ORDER — TADALAFIL 20 MG/1
20 TABLET ORAL DAILY PRN
Qty: 6 TABLET | Status: SHIPPED | OUTPATIENT
Start: 2021-04-20 | End: 2022-12-07 | Stop reason: SDUPTHER

## 2021-04-29 ENCOUNTER — PATIENT MESSAGE (OUTPATIENT)
Dept: RESEARCH | Facility: HOSPITAL | Age: 46
End: 2021-04-29

## 2021-05-04 ENCOUNTER — SPECIALTY PHARMACY (OUTPATIENT)
Dept: PHARMACY | Facility: CLINIC | Age: 46
End: 2021-05-04

## 2021-05-31 ENCOUNTER — PATIENT MESSAGE (OUTPATIENT)
Dept: PSYCHIATRY | Facility: CLINIC | Age: 46
End: 2021-05-31

## 2021-06-01 ENCOUNTER — SPECIALTY PHARMACY (OUTPATIENT)
Dept: PHARMACY | Facility: CLINIC | Age: 46
End: 2021-06-01

## 2021-06-29 ENCOUNTER — SPECIALTY PHARMACY (OUTPATIENT)
Dept: PHARMACY | Facility: CLINIC | Age: 46
End: 2021-06-29

## 2021-07-27 ENCOUNTER — PATIENT MESSAGE (OUTPATIENT)
Dept: PHARMACY | Facility: CLINIC | Age: 46
End: 2021-07-27

## 2021-07-27 DIAGNOSIS — G35 MULTIPLE SCLEROSIS: ICD-10-CM

## 2021-07-27 RX ORDER — GLATIRAMER 40 MG/ML
40 INJECTION, SOLUTION SUBCUTANEOUS
Qty: 36 ML | Refills: 1 | Status: SHIPPED | OUTPATIENT
Start: 2021-07-28 | End: 2022-01-24 | Stop reason: SDUPTHER

## 2021-07-28 ENCOUNTER — SPECIALTY PHARMACY (OUTPATIENT)
Dept: PHARMACY | Facility: CLINIC | Age: 46
End: 2021-07-28

## 2021-07-30 ENCOUNTER — IMMUNIZATION (OUTPATIENT)
Dept: FAMILY MEDICINE | Facility: CLINIC | Age: 46
End: 2021-07-30
Payer: COMMERCIAL

## 2021-07-30 DIAGNOSIS — Z23 NEED FOR VACCINATION: Primary | ICD-10-CM

## 2021-07-30 PROCEDURE — 0031A COVID-19,VECTOR-NR,RS-AD26,PF,0.5 ML DOSE VACCINE (JANSSEN): CPT | Mod: PBBFAC | Performed by: FAMILY MEDICINE

## 2021-08-04 ENCOUNTER — PATIENT MESSAGE (OUTPATIENT)
Dept: NEUROLOGY | Facility: CLINIC | Age: 46
End: 2021-08-04

## 2021-08-04 ENCOUNTER — OFFICE VISIT (OUTPATIENT)
Dept: NEUROLOGY | Facility: CLINIC | Age: 46
End: 2021-08-04
Payer: COMMERCIAL

## 2021-08-04 DIAGNOSIS — R53.83 FATIGUE, UNSPECIFIED TYPE: ICD-10-CM

## 2021-08-04 DIAGNOSIS — G35 MULTIPLE SCLEROSIS: Primary | ICD-10-CM

## 2021-08-04 DIAGNOSIS — Z86.59 HISTORY OF ATTENTION DEFICIT DISORDER: ICD-10-CM

## 2021-08-04 PROCEDURE — 99213 OFFICE O/P EST LOW 20 MIN: CPT | Mod: 95,,, | Performed by: CLINICAL NURSE SPECIALIST

## 2021-08-04 PROCEDURE — 1159F PR MEDICATION LIST DOCUMENTED IN MEDICAL RECORD: ICD-10-PCS | Mod: CPTII,,, | Performed by: CLINICAL NURSE SPECIALIST

## 2021-08-04 PROCEDURE — 1159F MED LIST DOCD IN RCRD: CPT | Mod: CPTII,,, | Performed by: CLINICAL NURSE SPECIALIST

## 2021-08-04 PROCEDURE — 99213 PR OFFICE/OUTPT VISIT, EST, LEVL III, 20-29 MIN: ICD-10-PCS | Mod: 95,,, | Performed by: CLINICAL NURSE SPECIALIST

## 2021-08-04 RX ORDER — LISDEXAMFETAMINE DIMESYLATE 40 MG/1
40 CAPSULE ORAL EVERY MORNING
Qty: 30 CAPSULE | Refills: 0 | OUTPATIENT
Start: 2021-08-04 | End: 2021-08-04

## 2021-08-04 RX ORDER — LISDEXAMFETAMINE DIMESYLATE 40 MG/1
40 CAPSULE ORAL EVERY MORNING
Qty: 30 CAPSULE | Refills: 0 | Status: SHIPPED | OUTPATIENT
Start: 2021-08-04 | End: 2021-09-03

## 2021-08-04 RX ORDER — LISDEXAMFETAMINE DIMESYLATE 40 MG/1
40 CAPSULE ORAL EVERY MORNING
Qty: 30 CAPSULE | Refills: 0 | Status: SHIPPED | OUTPATIENT
Start: 2021-10-03 | End: 2021-11-03 | Stop reason: SDUPTHER

## 2021-08-04 RX ORDER — LISDEXAMFETAMINE DIMESYLATE 40 MG/1
40 CAPSULE ORAL EVERY MORNING
Qty: 30 CAPSULE | Refills: 0 | Status: SHIPPED | OUTPATIENT
Start: 2021-09-03 | End: 2021-10-03

## 2021-08-04 RX ORDER — LISDEXAMFETAMINE DIMESYLATE 40 MG/1
40 CAPSULE ORAL EVERY MORNING
Qty: 30 CAPSULE | Refills: 0 | OUTPATIENT
Start: 2021-09-03 | End: 2021-08-04

## 2021-08-04 RX ORDER — LISDEXAMFETAMINE DIMESYLATE 40 MG/1
40 CAPSULE ORAL EVERY MORNING
Qty: 30 CAPSULE | Refills: 0 | OUTPATIENT
Start: 2021-10-03 | End: 2021-08-04

## 2021-08-25 ENCOUNTER — SPECIALTY PHARMACY (OUTPATIENT)
Dept: PHARMACY | Facility: CLINIC | Age: 46
End: 2021-08-25

## 2021-09-01 ENCOUNTER — PATIENT MESSAGE (OUTPATIENT)
Dept: PSYCHIATRY | Facility: CLINIC | Age: 46
End: 2021-09-01

## 2021-09-02 ENCOUNTER — PATIENT MESSAGE (OUTPATIENT)
Dept: PSYCHIATRY | Facility: CLINIC | Age: 46
End: 2021-09-02

## 2021-09-04 ENCOUNTER — PATIENT MESSAGE (OUTPATIENT)
Dept: NEUROLOGY | Facility: CLINIC | Age: 46
End: 2021-09-04

## 2021-09-21 ENCOUNTER — PATIENT MESSAGE (OUTPATIENT)
Dept: NEUROLOGY | Facility: CLINIC | Age: 46
End: 2021-09-21

## 2021-09-22 ENCOUNTER — PATIENT MESSAGE (OUTPATIENT)
Dept: PHARMACY | Facility: CLINIC | Age: 46
End: 2021-09-22

## 2021-09-27 ENCOUNTER — SPECIALTY PHARMACY (OUTPATIENT)
Dept: PHARMACY | Facility: CLINIC | Age: 46
End: 2021-09-27

## 2021-10-15 ENCOUNTER — HOSPITAL ENCOUNTER (OUTPATIENT)
Dept: RADIOLOGY | Facility: HOSPITAL | Age: 46
Discharge: HOME | End: 2021-10-15
Attending: PSYCHIATRY & NEUROLOGY
Payer: COMMERCIAL

## 2021-10-15 DIAGNOSIS — G35 MULTIPLE SCLEROSIS: ICD-10-CM

## 2021-10-15 PROCEDURE — 70551 MRI BRAIN DEMYELINATING WITHOUT CONTRAST: ICD-10-PCS | Mod: 26,,, | Performed by: RADIOLOGY

## 2021-10-15 PROCEDURE — 70551 MRI BRAIN STEM W/O DYE: CPT | Mod: 26,,, | Performed by: RADIOLOGY

## 2021-10-15 PROCEDURE — 70551 MRI BRAIN STEM W/O DYE: CPT | Mod: TC,PO

## 2021-10-22 ENCOUNTER — SPECIALTY PHARMACY (OUTPATIENT)
Dept: PHARMACY | Facility: CLINIC | Age: 46
End: 2021-10-22

## 2021-11-03 ENCOUNTER — OFFICE VISIT (OUTPATIENT)
Dept: NEUROLOGY | Facility: CLINIC | Age: 46
End: 2021-11-03
Payer: COMMERCIAL

## 2021-11-03 ENCOUNTER — LAB VISIT (OUTPATIENT)
Dept: LAB | Facility: HOSPITAL | Age: 46
End: 2021-11-03
Payer: COMMERCIAL

## 2021-11-03 VITALS
HEIGHT: 70 IN | BODY MASS INDEX: 26.75 KG/M2 | HEART RATE: 71 BPM | WEIGHT: 186.81 LBS | DIASTOLIC BLOOD PRESSURE: 84 MMHG | SYSTOLIC BLOOD PRESSURE: 123 MMHG

## 2021-11-03 DIAGNOSIS — Z71.89 COUNSELING REGARDING GOALS OF CARE: ICD-10-CM

## 2021-11-03 DIAGNOSIS — G35 MULTIPLE SCLEROSIS: Primary | ICD-10-CM

## 2021-11-03 DIAGNOSIS — Z29.89 PROPHYLACTIC IMMUNOTHERAPY: ICD-10-CM

## 2021-11-03 DIAGNOSIS — R53.83 FATIGUE, UNSPECIFIED TYPE: ICD-10-CM

## 2021-11-03 DIAGNOSIS — Z90.3 H/O GASTRIC SLEEVE: ICD-10-CM

## 2021-11-03 LAB
25(OH)D3+25(OH)D2 SERPL-MCNC: 109 NG/ML (ref 30–96)
ALBUMIN SERPL BCP-MCNC: 4.1 G/DL (ref 3.5–5.2)
ALP SERPL-CCNC: 95 U/L (ref 55–135)
ALT SERPL W/O P-5'-P-CCNC: 18 U/L (ref 10–44)
ANION GAP SERPL CALC-SCNC: 7 MMOL/L (ref 8–16)
AST SERPL-CCNC: 11 U/L (ref 10–40)
BASOPHILS # BLD AUTO: 0.06 K/UL (ref 0–0.2)
BASOPHILS NFR BLD: 1 % (ref 0–1.9)
BILIRUB SERPL-MCNC: 0.9 MG/DL (ref 0.1–1)
BUN SERPL-MCNC: 8 MG/DL (ref 6–20)
CALCIUM SERPL-MCNC: 9.9 MG/DL (ref 8.7–10.5)
CHLORIDE SERPL-SCNC: 104 MMOL/L (ref 95–110)
CHOLEST SERPL-MCNC: 189 MG/DL (ref 120–199)
CHOLEST/HDLC SERPL: 4 {RATIO} (ref 2–5)
CO2 SERPL-SCNC: 32 MMOL/L (ref 23–29)
CREAT SERPL-MCNC: 1 MG/DL (ref 0.5–1.4)
DIFFERENTIAL METHOD: NORMAL
EOSINOPHIL # BLD AUTO: 0.1 K/UL (ref 0–0.5)
EOSINOPHIL NFR BLD: 0.9 % (ref 0–8)
ERYTHROCYTE [DISTWIDTH] IN BLOOD BY AUTOMATED COUNT: 12.7 % (ref 11.5–14.5)
EST. GFR  (AFRICAN AMERICAN): >60 ML/MIN/1.73 M^2
EST. GFR  (NON AFRICAN AMERICAN): >60 ML/MIN/1.73 M^2
GLUCOSE SERPL-MCNC: 92 MG/DL (ref 70–110)
HCT VFR BLD AUTO: 46.6 % (ref 40–54)
HDLC SERPL-MCNC: 47 MG/DL (ref 40–75)
HDLC SERPL: 24.9 % (ref 20–50)
HGB BLD-MCNC: 15.4 G/DL (ref 14–18)
IMM GRANULOCYTES # BLD AUTO: 0.01 K/UL (ref 0–0.04)
IMM GRANULOCYTES NFR BLD AUTO: 0.2 % (ref 0–0.5)
LDLC SERPL CALC-MCNC: 118.8 MG/DL (ref 63–159)
LYMPHOCYTES # BLD AUTO: 1.7 K/UL (ref 1–4.8)
LYMPHOCYTES NFR BLD: 29.4 % (ref 18–48)
MCH RBC QN AUTO: 28.4 PG (ref 27–31)
MCHC RBC AUTO-ENTMCNC: 33 G/DL (ref 32–36)
MCV RBC AUTO: 86 FL (ref 82–98)
MONOCYTES # BLD AUTO: 0.4 K/UL (ref 0.3–1)
MONOCYTES NFR BLD: 7.1 % (ref 4–15)
NEUTROPHILS # BLD AUTO: 3.6 K/UL (ref 1.8–7.7)
NEUTROPHILS NFR BLD: 61.4 % (ref 38–73)
NONHDLC SERPL-MCNC: 142 MG/DL
NRBC BLD-RTO: 0 /100 WBC
PLATELET # BLD AUTO: 253 K/UL (ref 150–450)
PMV BLD AUTO: 10 FL (ref 9.2–12.9)
POTASSIUM SERPL-SCNC: 4.6 MMOL/L (ref 3.5–5.1)
PROT SERPL-MCNC: 6.2 G/DL (ref 6–8.4)
RBC # BLD AUTO: 5.42 M/UL (ref 4.6–6.2)
SODIUM SERPL-SCNC: 143 MMOL/L (ref 136–145)
TRIGL SERPL-MCNC: 116 MG/DL (ref 30–150)
TSH SERPL DL<=0.005 MIU/L-ACNC: 0.52 UIU/ML (ref 0.4–4)
VIT B12 SERPL-MCNC: 412 PG/ML (ref 210–950)
WBC # BLD AUTO: 5.79 K/UL (ref 3.9–12.7)

## 2021-11-03 PROCEDURE — 3008F PR BODY MASS INDEX (BMI) DOCUMENTED: ICD-10-PCS | Mod: CPTII,S$GLB,, | Performed by: CLINICAL NURSE SPECIALIST

## 2021-11-03 PROCEDURE — 3079F PR MOST RECENT DIASTOLIC BLOOD PRESSURE 80-89 MM HG: ICD-10-PCS | Mod: CPTII,S$GLB,, | Performed by: CLINICAL NURSE SPECIALIST

## 2021-11-03 PROCEDURE — 99999 PR PBB SHADOW E&M-EST. PATIENT-LVL III: ICD-10-PCS | Mod: PBBFAC,,, | Performed by: CLINICAL NURSE SPECIALIST

## 2021-11-03 PROCEDURE — 84207 ASSAY OF VITAMIN B-6: CPT | Performed by: CLINICAL NURSE SPECIALIST

## 2021-11-03 PROCEDURE — 84446 ASSAY OF VITAMIN E: CPT | Performed by: CLINICAL NURSE SPECIALIST

## 2021-11-03 PROCEDURE — 99215 PR OFFICE/OUTPT VISIT, EST, LEVL V, 40-54 MIN: ICD-10-PCS | Mod: S$GLB,,, | Performed by: CLINICAL NURSE SPECIALIST

## 2021-11-03 PROCEDURE — 99215 OFFICE O/P EST HI 40 MIN: CPT | Mod: S$GLB,,, | Performed by: CLINICAL NURSE SPECIALIST

## 2021-11-03 PROCEDURE — 3008F BODY MASS INDEX DOCD: CPT | Mod: CPTII,S$GLB,, | Performed by: CLINICAL NURSE SPECIALIST

## 2021-11-03 PROCEDURE — 82607 VITAMIN B-12: CPT | Performed by: CLINICAL NURSE SPECIALIST

## 2021-11-03 PROCEDURE — 84443 ASSAY THYROID STIM HORMONE: CPT | Performed by: CLINICAL NURSE SPECIALIST

## 2021-11-03 PROCEDURE — 99999 PR PBB SHADOW E&M-EST. PATIENT-LVL III: CPT | Mod: PBBFAC,,, | Performed by: CLINICAL NURSE SPECIALIST

## 2021-11-03 PROCEDURE — 1159F PR MEDICATION LIST DOCUMENTED IN MEDICAL RECORD: ICD-10-PCS | Mod: CPTII,S$GLB,, | Performed by: CLINICAL NURSE SPECIALIST

## 2021-11-03 PROCEDURE — 1159F MED LIST DOCD IN RCRD: CPT | Mod: CPTII,S$GLB,, | Performed by: CLINICAL NURSE SPECIALIST

## 2021-11-03 PROCEDURE — 84590 ASSAY OF VITAMIN A: CPT | Performed by: CLINICAL NURSE SPECIALIST

## 2021-11-03 PROCEDURE — 82306 VITAMIN D 25 HYDROXY: CPT | Performed by: CLINICAL NURSE SPECIALIST

## 2021-11-03 PROCEDURE — 3074F PR MOST RECENT SYSTOLIC BLOOD PRESSURE < 130 MM HG: ICD-10-PCS | Mod: CPTII,S$GLB,, | Performed by: CLINICAL NURSE SPECIALIST

## 2021-11-03 PROCEDURE — 80061 LIPID PANEL: CPT | Performed by: CLINICAL NURSE SPECIALIST

## 2021-11-03 PROCEDURE — 84425 ASSAY OF VITAMIN B-1: CPT | Performed by: CLINICAL NURSE SPECIALIST

## 2021-11-03 PROCEDURE — 80053 COMPREHEN METABOLIC PANEL: CPT | Performed by: CLINICAL NURSE SPECIALIST

## 2021-11-03 PROCEDURE — 3079F DIAST BP 80-89 MM HG: CPT | Mod: CPTII,S$GLB,, | Performed by: CLINICAL NURSE SPECIALIST

## 2021-11-03 PROCEDURE — 3074F SYST BP LT 130 MM HG: CPT | Mod: CPTII,S$GLB,, | Performed by: CLINICAL NURSE SPECIALIST

## 2021-11-03 PROCEDURE — 85025 COMPLETE CBC W/AUTO DIFF WBC: CPT | Performed by: CLINICAL NURSE SPECIALIST

## 2021-11-03 RX ORDER — LISDEXAMFETAMINE DIMESYLATE 40 MG/1
40 CAPSULE ORAL EVERY MORNING
Qty: 30 CAPSULE | Refills: 0 | Status: SHIPPED | OUTPATIENT
Start: 2021-11-03 | End: 2021-12-03

## 2021-11-03 RX ORDER — DEXTROAMPHETAMINE SACCHARATE, AMPHETAMINE ASPARTATE, DEXTROAMPHETAMINE SULFATE AND AMPHETAMINE SULFATE 7.5; 7.5; 7.5; 7.5 MG/1; MG/1; MG/1; MG/1
30 TABLET ORAL
COMMUNITY
End: 2021-11-03

## 2021-11-03 RX ORDER — LISDEXAMFETAMINE DIMESYLATE 40 MG/1
40 CAPSULE ORAL EVERY MORNING
Qty: 30 CAPSULE | Refills: 0 | Status: SHIPPED | OUTPATIENT
Start: 2022-01-02 | End: 2022-02-03 | Stop reason: SDUPTHER

## 2021-11-03 RX ORDER — LISDEXAMFETAMINE DIMESYLATE 40 MG/1
40 CAPSULE ORAL EVERY MORNING
Qty: 30 CAPSULE | Refills: 0 | Status: SHIPPED | OUTPATIENT
Start: 2021-12-03 | End: 2022-01-02

## 2021-11-08 LAB
A-TOCOPHEROL VIT E SERPL-MCNC: 1290 UG/DL (ref 500–1800)
PYRIDOXAL SERPL-MCNC: 5 UG/L (ref 5–50)
VIT A SERPL-MCNC: 44 UG/DL (ref 38–106)
VIT B1 BLD-MCNC: 74 UG/L (ref 38–122)

## 2021-11-18 ENCOUNTER — SPECIALTY PHARMACY (OUTPATIENT)
Dept: PHARMACY | Facility: CLINIC | Age: 46
End: 2021-11-18
Payer: COMMERCIAL

## 2021-12-10 ENCOUNTER — OFFICE VISIT (OUTPATIENT)
Dept: OPTOMETRY | Facility: CLINIC | Age: 46
End: 2021-12-10
Payer: COMMERCIAL

## 2021-12-10 DIAGNOSIS — H52.4 MYOPIA WITH ASTIGMATISM AND PRESBYOPIA, BILATERAL: Primary | ICD-10-CM

## 2021-12-10 DIAGNOSIS — G35 MULTIPLE SCLEROSIS: ICD-10-CM

## 2021-12-10 DIAGNOSIS — H52.13 MYOPIA WITH ASTIGMATISM AND PRESBYOPIA, BILATERAL: Primary | ICD-10-CM

## 2021-12-10 DIAGNOSIS — H52.203 MYOPIA WITH ASTIGMATISM AND PRESBYOPIA, BILATERAL: Primary | ICD-10-CM

## 2021-12-10 PROCEDURE — 92015 PR REFRACTION: ICD-10-PCS | Mod: S$GLB,,, | Performed by: OPTOMETRIST

## 2021-12-10 PROCEDURE — 92015 DETERMINE REFRACTIVE STATE: CPT | Mod: S$GLB,,, | Performed by: OPTOMETRIST

## 2021-12-10 PROCEDURE — 92014 COMPRE OPH EXAM EST PT 1/>: CPT | Mod: S$GLB,,, | Performed by: OPTOMETRIST

## 2021-12-10 PROCEDURE — 99999 PR PBB SHADOW E&M-EST. PATIENT-LVL III: ICD-10-PCS | Mod: PBBFAC,,, | Performed by: OPTOMETRIST

## 2021-12-10 PROCEDURE — 92014 PR EYE EXAM, EST PATIENT,COMPREHESV: ICD-10-PCS | Mod: S$GLB,,, | Performed by: OPTOMETRIST

## 2021-12-10 PROCEDURE — 99999 PR PBB SHADOW E&M-EST. PATIENT-LVL III: CPT | Mod: PBBFAC,,, | Performed by: OPTOMETRIST

## 2021-12-20 ENCOUNTER — SPECIALTY PHARMACY (OUTPATIENT)
Dept: PHARMACY | Facility: CLINIC | Age: 46
End: 2021-12-20
Payer: COMMERCIAL

## 2021-12-21 ENCOUNTER — PATIENT MESSAGE (OUTPATIENT)
Dept: NEUROLOGY | Facility: CLINIC | Age: 46
End: 2021-12-21
Payer: COMMERCIAL

## 2021-12-22 ENCOUNTER — TELEPHONE (OUTPATIENT)
Dept: NEUROLOGY | Facility: CLINIC | Age: 46
End: 2021-12-22
Payer: COMMERCIAL

## 2022-01-24 ENCOUNTER — SPECIALTY PHARMACY (OUTPATIENT)
Dept: PHARMACY | Facility: CLINIC | Age: 47
End: 2022-01-24
Payer: COMMERCIAL

## 2022-01-24 DIAGNOSIS — G35 MULTIPLE SCLEROSIS: ICD-10-CM

## 2022-01-24 RX ORDER — GLATIRAMER 40 MG/ML
40 INJECTION, SOLUTION SUBCUTANEOUS
Qty: 36 ML | Refills: 1 | Status: SHIPPED | OUTPATIENT
Start: 2022-01-24 | End: 2022-08-08 | Stop reason: SDUPTHER

## 2022-01-25 NOTE — TELEPHONE ENCOUNTER
Coapxone refills received. Dose appropriate. No lab monitoring required. No PA required. Releasing Rx to [facility].

## 2022-02-03 ENCOUNTER — OFFICE VISIT (OUTPATIENT)
Dept: NEUROLOGY | Facility: CLINIC | Age: 47
End: 2022-02-03
Payer: COMMERCIAL

## 2022-02-03 VITALS — WEIGHT: 175 LBS | BODY MASS INDEX: 25.05 KG/M2 | HEIGHT: 70 IN

## 2022-02-03 DIAGNOSIS — R53.83 FATIGUE, UNSPECIFIED TYPE: ICD-10-CM

## 2022-02-03 DIAGNOSIS — G35 MULTIPLE SCLEROSIS: Primary | ICD-10-CM

## 2022-02-03 DIAGNOSIS — Z71.89 COUNSELING REGARDING GOALS OF CARE: ICD-10-CM

## 2022-02-03 PROCEDURE — 1159F PR MEDICATION LIST DOCUMENTED IN MEDICAL RECORD: ICD-10-PCS | Mod: CPTII,95,, | Performed by: CLINICAL NURSE SPECIALIST

## 2022-02-03 PROCEDURE — 99212 PR OFFICE/OUTPT VISIT, EST, LEVL II, 10-19 MIN: ICD-10-PCS | Mod: 95,,, | Performed by: CLINICAL NURSE SPECIALIST

## 2022-02-03 PROCEDURE — 3008F PR BODY MASS INDEX (BMI) DOCUMENTED: ICD-10-PCS | Mod: CPTII,95,, | Performed by: CLINICAL NURSE SPECIALIST

## 2022-02-03 PROCEDURE — 3008F BODY MASS INDEX DOCD: CPT | Mod: CPTII,95,, | Performed by: CLINICAL NURSE SPECIALIST

## 2022-02-03 PROCEDURE — 99212 OFFICE O/P EST SF 10 MIN: CPT | Mod: 95,,, | Performed by: CLINICAL NURSE SPECIALIST

## 2022-02-03 PROCEDURE — 1159F MED LIST DOCD IN RCRD: CPT | Mod: CPTII,95,, | Performed by: CLINICAL NURSE SPECIALIST

## 2022-02-03 RX ORDER — LISDEXAMFETAMINE DIMESYLATE 40 MG/1
40 CAPSULE ORAL EVERY MORNING
Qty: 30 CAPSULE | Refills: 0 | Status: SHIPPED | OUTPATIENT
Start: 2022-04-04 | End: 2022-06-13 | Stop reason: SDUPTHER

## 2022-02-03 RX ORDER — LISDEXAMFETAMINE DIMESYLATE 40 MG/1
40 CAPSULE ORAL EVERY MORNING
Qty: 30 CAPSULE | Refills: 0 | Status: SHIPPED | OUTPATIENT
Start: 2022-02-03 | End: 2022-03-05

## 2022-02-03 RX ORDER — LISDEXAMFETAMINE DIMESYLATE 40 MG/1
40 CAPSULE ORAL EVERY MORNING
Qty: 30 CAPSULE | Refills: 0 | Status: SHIPPED | OUTPATIENT
Start: 2022-03-05 | End: 2022-04-04

## 2022-02-03 NOTE — PROGRESS NOTES
Subjective:          Patient ID: Dory Raza is a 46 y.o. male who presents today for a routine virtual visit for MS.  He was last seen in November 2021. The history has been provided by the patient.     The patient location is: his workplace   The chief complaint leading to consultation is: MS/fatigue     Visit type: audiovisual    Face to Face time with patient: 8 minutes   15 minutes of total time spent on the encounter, which includes face to face time and non-face to face time preparing to see the patient (eg, review of tests), Obtaining and/or reviewing separately obtained history, Documenting clinical information in the electronic or other health record, Independently interpreting results (not separately reported) and communicating results to the patient/family/caregiver, or Care coordination (not separately reported).         Each patient to whom he or she provides medical services by telemedicine is:  (1) informed of the relationship between the physician and patient and the respective role of any other health care provider with respect to management of the patient; and (2) notified that he or she may decline to receive medical services by telemedicine and may withdraw from such care at any time.    Notes:     MS HPI:  · DMT: glatiramer acetate  · Side effects from DMT? No  · Taking vitamin D3 as recommended? Yes -  Dose:   He denies any new or different neurological symptoms.   He weighs 175lb now.   He is trying to exercise.  He recently took his gym membership off of hold.    He continues to take Vyvanse 40mg. He feels like this dose is helpful. He takes breaks on weekends if needed. He denies any issues with sleep or heart palpitations.   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     Medications:  Current Outpatient Medications   Medication Sig    cholecalciferol, vitamin D3, (VITAMIN D3) 50 mcg (2,000 unit) Cap Take 1 capsule by mouth once daily. Take 6000 IU daily.    glatiramer  (COPAXONE) 40 mg/mL injection INJECT 40 MG INTO THE SKIN THREE TIMES A WEEK.    lisdexamfetamine (VYVANSE) 40 MG Cap Take 1 capsule (40 mg total) by mouth every morning.    multivitamin capsule Take 1 capsule by mouth once daily.    sildenafiL (VIAGRA) 100 MG tablet TAKE 1 TABLET (100 MG TOTAL) BY MOUTH DAILY AS NEEDED FOR ERECTILE DYSFUNCTION.    tadalafiL (CIALIS) 20 MG Tab Take 1 tablet (20 mg total) by mouth daily as needed (ED).         SOCIAL HISTORY  Social History     Tobacco Use    Smoking status: Former Smoker     Types: Cigarettes     Quit date: 2010     Years since quittin.9    Smokeless tobacco: Never Used   Substance Use Topics    Alcohol use: Yes     Alcohol/week: 0.0 standard drinks     Comment: socially    Drug use: No       Living arrangements - the patient lives with his family      Objective:        1. 25 foot timed walk:  Timed 25 Foot Walk: 3/24/2021 11/3/2021   Did patient wear an AFO? No No   Was assistive device used? No No   Time for 25 Foot Walk (seconds) 3 3   Time for 25 Foot Walk (seconds) - 2.7     Neurologic Exam    Deferred   Imaging:     Results for orders placed during the hospital encounter of 10/15/21    MRI Brain Demyelinating Without Contrast    Impression  Stable single right periventricular white matter lesion, which demonstrates no significant interval change as compared to multiple prior exams dating back to , in keeping with the patient's history of multiple sclerosis.  No new lesion or diffusion restriction to suggest active demyelination.      Electronically signed by: Channing Chase  Date:    10/15/2021  Time:    14:46    Labs:     Lab Results   Component Value Date    [id number] 109 (H) 2021    [id number] 48 2020    KEPUMHEG05PW 80 2019     Lab Results   Component Value Date    WBC 5.79 2021    RBC 5.42 2021    HGB 15.4 2021    HCT 46.6 2021    MCV 86 2021    MCH 28.4 2021    MCHC 33.0  11/03/2021    RDW 12.7 11/03/2021     11/03/2021    MPV 10.0 11/03/2021    GRAN 3.6 11/03/2021    GRAN 61.4 11/03/2021    LYMPH 1.7 11/03/2021    LYMPH 29.4 11/03/2021    MONO 0.4 11/03/2021    MONO 7.1 11/03/2021    EOS 0.1 11/03/2021    BASO 0.06 11/03/2021    EOSINOPHIL 0.9 11/03/2021    BASOPHIL 1.0 11/03/2021     Sodium   Date Value Ref Range Status   11/03/2021 143 136 - 145 mmol/L Final     Potassium   Date Value Ref Range Status   11/03/2021 4.6 3.5 - 5.1 mmol/L Final     Chloride   Date Value Ref Range Status   11/03/2021 104 95 - 110 mmol/L Final     CO2   Date Value Ref Range Status   11/03/2021 32 (H) 23 - 29 mmol/L Final     Glucose   Date Value Ref Range Status   11/03/2021 92 70 - 110 mg/dL Final     BUN   Date Value Ref Range Status   11/03/2021 8 6 - 20 mg/dL Final     Creatinine   Date Value Ref Range Status   11/03/2021 1.0 0.5 - 1.4 mg/dL Final     Calcium   Date Value Ref Range Status   11/03/2021 9.9 8.7 - 10.5 mg/dL Final     Total Protein   Date Value Ref Range Status   11/03/2021 6.2 6.0 - 8.4 g/dL Final     Albumin   Date Value Ref Range Status   11/03/2021 4.1 3.5 - 5.2 g/dL Final     Total Bilirubin   Date Value Ref Range Status   11/03/2021 0.9 0.1 - 1.0 mg/dL Final     Comment:     For infants and newborns, interpretation of results should be based  on gestational age, weight and in agreement with clinical  observations.    Premature Infant recommended reference ranges:  Up to 24 hours.............<8.0 mg/dL  Up to 48 hours............<12.0 mg/dL  3-5 days..................<15.0 mg/dL  6-29 days.................<15.0 mg/dL       Alkaline Phosphatase   Date Value Ref Range Status   11/03/2021 95 55 - 135 U/L Final     AST   Date Value Ref Range Status   11/03/2021 11 10 - 40 U/L Final     ALT   Date Value Ref Range Status   11/03/2021 18 10 - 44 U/L Final     Anion Gap   Date Value Ref Range Status   11/03/2021 7 (L) 8 - 16 mmol/L Final     eGFR if    Date Value  Ref Range Status   11/03/2021 >60.0 >60 mL/min/1.73 m^2 Final     eGFR if non    Date Value Ref Range Status   11/03/2021 >60.0 >60 mL/min/1.73 m^2 Final     Comment:     Calculation used to obtain the estimated glomerular filtration  rate (eGFR) is the CKD-EPI equation.            MS Impression and Plan:     NEURO MULTIPLE SCLEROSIS IMPRESSION:   MS Status:     Number of relapses in the past year?:  0  Plan:     DMT:  No change in management    DMT comment:  Continue glatiramer and Vitamin D.     Symptom Management:  No change in symptom management     MRI brain in October    He will follow up with Dr. Hernandez in May 2022    Vyvanse refilled x3 months. Prescriptions sent to pharmacy.         RUSSEL Cowan, CNS    Problem List Items Addressed This Visit        Neurologic Problems    Multiple sclerosis - Primary    Relevant Medications    lisdexamfetamine (VYVANSE) 40 MG Cap    lisdexamfetamine (VYVANSE) 40 MG Cap (Start on 3/5/2022)    lisdexamfetamine (VYVANSE) 40 MG Cap (Start on 4/4/2022)      Other Visit Diagnoses     Fatigue, unspecified type        Relevant Medications    lisdexamfetamine (VYVANSE) 40 MG Cap    lisdexamfetamine (VYVANSE) 40 MG Cap (Start on 3/5/2022)    lisdexamfetamine (VYVANSE) 40 MG Cap (Start on 4/4/2022)    Counseling regarding goals of care

## 2022-02-04 ENCOUNTER — TELEPHONE (OUTPATIENT)
Dept: NEUROLOGY | Facility: CLINIC | Age: 47
End: 2022-02-04
Payer: COMMERCIAL

## 2022-02-07 ENCOUNTER — SPECIALTY PHARMACY (OUTPATIENT)
Dept: PHARMACY | Facility: CLINIC | Age: 47
End: 2022-02-07
Payer: COMMERCIAL

## 2022-02-07 NOTE — TELEPHONE ENCOUNTER
Specialty Pharmacy - Refill Coordination    Specialty Medication Orders Linked to Encounter    Flowsheet Row Most Recent Value   Medication #1 glatiramer (COPAXONE) 40 mg/mL injection (Order#[id number], Rx#[id number])          Refill Questions - Documented Responses    Flowsheet Row Most Recent Value   Patient Availability and HIPAA Verification    Does patient want to proceed with activity? Yes   HIPAA/medical authority confirmed? Yes   Relationship to patient of person spoken to? Self   Refill Screening Questions    Changes to allergies? No   Changes to medications? No   New conditions since last clinic visit? No   Unplanned office visit, urgent care, ED, or hospital admission in the last 4 weeks? No   How does patient/caregiver feel medication is working? Good   Financial problems or insurance changes? No   How many doses of your specialty medications were missed in the last 4 weeks? 0   Would patient like to speak to a pharmacist? No   When does the patient need to receive the medication? 02/11/22   Refill Delivery Questions    How will the patient receive the medication? Delivery Hodan   When does the patient need to receive the medication? 02/11/22   Shipping Address Home   Address in [facility] confirmed and updated if neccessary? Yes   Expected Copay ($) 0   Is the patient able to afford the medication copay? Yes   Payment Method zero copay   Days supply of Refill 28   Supplies needed? No supplies needed   Refill activity completed? Yes   Refill activity plan Refill scheduled   Shipment/Pickup Date: 02/11/22          Current Outpatient Medications   Medication Sig    cholecalciferol, vitamin D3, (VITAMIN D3) 50 mcg (2,000 unit) Cap Take 1 capsule by mouth once daily. Take 6000 IU daily.    glatiramer (COPAXONE) 40 mg/mL injection INJECT 40 MG INTO THE SKIN THREE TIMES A WEEK.    lisdexamfetamine (VYVANSE) 40 MG Cap Take 1 capsule (40 mg total) by mouth every morning.    [START ON 3/5/2022]  lisdexamfetamine (VYVANSE) 40 MG Cap Take 1 capsule (40 mg total) by mouth every morning.    [START ON 4/4/2022] lisdexamfetamine (VYVANSE) 40 MG Cap Take 1 capsule (40 mg total) by mouth every morning.    sildenafiL (VIAGRA) 100 MG tablet TAKE 1 TABLET (100 MG TOTAL) BY MOUTH DAILY AS NEEDED FOR ERECTILE DYSFUNCTION.    tadalafiL (CIALIS) 20 MG Tab Take 1 tablet (20 mg total) by mouth daily as needed (ED).   Last reviewed on 2/3/2022  9:33 AM by Katrin Sesay, APRN, CNS    Review of patient's allergies indicates:   Allergen Reactions    Proamatine [midodrine] Other (See Comments)     Vasectomy    Last reviewed on  2/3/2022 9:33 AM by Katrin Sesay      Tasks added this encounter   3/4/2022 - Refill Call (Auto Added)   Tasks due within next 3 months   No tasks due.     Donna Salazar, PharmD  [facility]  [address] [place] [address]  Phone: [phone]  Fax: [phone]

## 2022-02-28 ENCOUNTER — PATIENT MESSAGE (OUTPATIENT)
Dept: PSYCHIATRY | Facility: CLINIC | Age: 47
End: 2022-02-28
Payer: COMMERCIAL

## 2022-03-04 ENCOUNTER — SPECIALTY PHARMACY (OUTPATIENT)
Dept: PHARMACY | Facility: CLINIC | Age: 47
End: 2022-03-04
Payer: COMMERCIAL

## 2022-03-04 NOTE — TELEPHONE ENCOUNTER
Specialty Pharmacy - Refill Coordination    Specialty Medication Orders Linked to Encounter    Flowsheet Row Most Recent Value   Medication #1 glatiramer (COPAXONE) 40 mg/mL injection (Order#[id number], Rx#[id number])          Refill Questions - Documented Responses    Flowsheet Row Most Recent Value   Patient Availability and HIPAA Verification    Does patient want to proceed with activity? Yes   HIPAA/medical authority confirmed? Yes   Relationship to patient of person spoken to? Self   Refill Screening Questions    Changes to allergies? No   Changes to medications? No   New conditions since last clinic visit? No   Unplanned office visit, urgent care, ED, or hospital admission in the last 4 weeks? No   How does patient/caregiver feel medication is working? Good   Financial problems or insurance changes? No   How many doses of your specialty medications were missed in the last 4 weeks? 0   Would patient like to speak to a pharmacist? No   When does the patient need to receive the medication? 03/14/22   Refill Delivery Questions    When does the patient need to receive the medication? 03/14/22   Shipping Address Home   Address in [facility] confirmed and updated if neccessary? Yes   Expected Copay ($) 0   Is the patient able to afford the medication copay? Yes   Payment Method zero copay   Days supply of Refill 28   Supplies needed? No supplies needed   Refill activity completed? Yes   Refill activity plan Refill scheduled   Shipment/Pickup Date: 03/10/22          Current Outpatient Medications   Medication Sig    cholecalciferol, vitamin D3, (VITAMIN D3) 50 mcg (2,000 unit) Cap Take 1 capsule by mouth once daily. Take 6000 IU daily.    glatiramer (COPAXONE) 40 mg/mL injection INJECT 40 MG INTO THE SKIN THREE TIMES A WEEK.    lisdexamfetamine (VYVANSE) 40 MG Cap Take 1 capsule (40 mg total) by mouth every morning.    [START ON 3/5/2022] lisdexamfetamine (VYVANSE) 40 MG Cap Take 1 capsule (40 mg total) by  mouth every morning.    [START ON 4/4/2022] lisdexamfetamine (VYVANSE) 40 MG Cap Take 1 capsule (40 mg total) by mouth every morning.    sildenafiL (VIAGRA) 100 MG tablet TAKE 1 TABLET (100 MG TOTAL) BY MOUTH DAILY AS NEEDED FOR ERECTILE DYSFUNCTION.    tadalafiL (CIALIS) 20 MG Tab Take 1 tablet (20 mg total) by mouth daily as needed (ED).   Last reviewed on 2/3/2022  9:33 AM by Katrin Sesay, APRN, CNS    Review of patient's allergies indicates:   Allergen Reactions    Proamatine [midodrine] Other (See Comments)     Vasectomy    Last reviewed on  2/3/2022 9:33 AM by Katrin Sesay      Tasks added this encounter   4/4/2022 - Refill Call (Auto Added)   Tasks due within next 3 months   No tasks due.     Nori Lazcano, PharmD  [facility]  [address] [place] [address]  Phone: [phone]  Fax: [phone]

## 2022-03-18 ENCOUNTER — OCCUPATIONAL HEALTH (OUTPATIENT)
Dept: URGENT CARE | Facility: CLINIC | Age: 47
End: 2022-03-18

## 2022-03-18 ENCOUNTER — OCCUPATIONAL HEALTH (OUTPATIENT)
Dept: URGENT CARE | Facility: CLINIC | Age: 47
End: 2022-03-18
Payer: COMMERCIAL

## 2022-03-18 DIAGNOSIS — Z13.9 ENCOUNTER FOR SCREENING: Primary | ICD-10-CM

## 2022-03-18 DIAGNOSIS — Z02.1 PRE-EMPLOYMENT EXAMINATION: Primary | ICD-10-CM

## 2022-03-18 PROCEDURE — 92552 AUDIOGRAM OCC MED: ICD-10-PCS | Mod: S$GLB,,, | Performed by: FAMILY MEDICINE

## 2022-03-18 PROCEDURE — 99002 MASK FIT-QUANTITATIVE: ICD-10-PCS | Mod: S$GLB,,, | Performed by: FAMILY MEDICINE

## 2022-03-18 PROCEDURE — 99002 DEVICE HANDLING PHYS/QHP: CPT | Mod: S$GLB,,, | Performed by: FAMILY MEDICINE

## 2022-03-18 PROCEDURE — 92552 PURE TONE AUDIOMETRY AIR: CPT | Mod: S$GLB,,, | Performed by: FAMILY MEDICINE

## 2022-03-18 PROCEDURE — 86480 QUANTIFERON GOLD TB: ICD-10-PCS | Mod: S$GLB,,, | Performed by: FAMILY MEDICINE

## 2022-03-18 PROCEDURE — 99080 OSHA QUESTIONNAIRE: ICD-10-PCS | Mod: S$GLB,,, | Performed by: FAMILY MEDICINE

## 2022-03-18 PROCEDURE — 99080 SPECIAL REPORTS OR FORMS: CPT | Mod: S$GLB,,, | Performed by: FAMILY MEDICINE

## 2022-03-18 PROCEDURE — 86480 TB TEST CELL IMMUN MEASURE: CPT | Mod: S$GLB,,, | Performed by: FAMILY MEDICINE

## 2022-03-23 LAB
GAMMA INTERFERON BACKGROUND BLD IA-ACNC: 0 IU/ML
M TB IFN-G BLD-IMP: NEGATIVE
M TB IFN-G CD4+ BCKGRND COR BLD-ACNC: 0.01 IU/ML
M TB IFN-G CD4+CD8+ BCKGRND COR BLD-ACNC: 0.01 IU/ML
MITOGEN IGNF BLD-ACNC: >10 IU/ML
QUANTIFERON TB GOLD (INCUBATED): NORMAL
SERVICE CMNT-IMP: NORMAL

## 2022-04-01 DIAGNOSIS — R53.83 FATIGUE, UNSPECIFIED TYPE: ICD-10-CM

## 2022-04-01 DIAGNOSIS — G35 MULTIPLE SCLEROSIS: ICD-10-CM

## 2022-04-01 RX ORDER — LISDEXAMFETAMINE DIMESYLATE 40 MG/1
40 CAPSULE ORAL EVERY MORNING
Qty: 30 CAPSULE | Refills: 0 | Status: CANCELLED | OUTPATIENT
Start: 2022-04-01 | End: 2022-05-01

## 2022-04-04 ENCOUNTER — PATIENT MESSAGE (OUTPATIENT)
Dept: NEUROLOGY | Facility: CLINIC | Age: 47
End: 2022-04-04
Payer: COMMERCIAL

## 2022-04-04 ENCOUNTER — SPECIALTY PHARMACY (OUTPATIENT)
Dept: PHARMACY | Facility: CLINIC | Age: 47
End: 2022-04-04
Payer: COMMERCIAL

## 2022-04-04 NOTE — TELEPHONE ENCOUNTER
Specialty Pharmacy - Refill Coordination    Specialty Medication Orders Linked to Encounter    Flowsheet Row Most Recent Value   Medication #1 glatiramer (COPAXONE) 40 mg/mL injection (Order#[id number], Rx#[id number])          Refill Questions - Documented Responses    Flowsheet Row Most Recent Value   Patient Availability and HIPAA Verification    Does patient want to proceed with activity? Yes   HIPAA/medical authority confirmed? Yes   Relationship to patient of person spoken to? Self   Refill Screening Questions    Changes to allergies? No   Changes to medications? No   New conditions since last clinic visit? No   Unplanned office visit, urgent care, ED, or hospital admission in the last 4 weeks? No   How does patient/caregiver feel medication is working? Good   Financial problems or insurance changes? No   How many doses of your specialty medications were missed in the last 4 weeks? 0   Would patient like to speak to a pharmacist? No   When does the patient need to receive the medication? 04/09/22   Refill Delivery Questions    How will the patient receive the medication? Delivery Hodan   When does the patient need to receive the medication? 04/09/22   Shipping Address Home   Address in [facility] confirmed and updated if neccessary? Yes   Expected Copay ($) 0   Is the patient able to afford the medication copay? Yes   Payment Method zero copay   Days supply of Refill 28   Supplies needed? No supplies needed   Refill activity completed? Yes   Refill activity plan Refill scheduled   Shipment/Pickup Date: 04/07/22          Current Outpatient Medications   Medication Sig    cholecalciferol, vitamin D3, (VITAMIN D3) 50 mcg (2,000 unit) Cap Take 1 capsule by mouth once daily. Take 6000 IU daily.    glatiramer (COPAXONE) 40 mg/mL injection INJECT 40 MG INTO THE SKIN THREE TIMES A WEEK.    lisdexamfetamine (VYVANSE) 40 MG Cap Take 1 capsule (40 mg total) by mouth every morning.    lisdexamfetamine (VYVANSE)  40 MG Cap Take 1 capsule (40 mg total) by mouth every morning.    sildenafiL (VIAGRA) 100 MG tablet TAKE 1 TABLET (100 MG TOTAL) BY MOUTH DAILY AS NEEDED FOR ERECTILE DYSFUNCTION.    tadalafiL (CIALIS) 20 MG Tab Take 1 tablet (20 mg total) by mouth daily as needed (ED).   Last reviewed on 2/3/2022  9:33 AM by Katrin Sesay, APRN, CNS    Review of patient's allergies indicates:   Allergen Reactions    Proamatine [midodrine] Other (See Comments)     Vasectomy    Last reviewed on  2/3/2022 9:33 AM by Katrin Sesay      Tasks added this encounter   4/30/2022 - Refill Call (Auto Added)   Tasks due within next 3 months   No tasks due.     Nori Lazcano, PharmD  [facility]  [address] [place] [address]  Phone: [phone]  Fax: [phone]

## 2022-04-18 ENCOUNTER — PATIENT MESSAGE (OUTPATIENT)
Dept: ADMINISTRATIVE | Facility: OTHER | Age: 47
End: 2022-04-18
Payer: COMMERCIAL

## 2022-05-02 ENCOUNTER — SPECIALTY PHARMACY (OUTPATIENT)
Dept: PHARMACY | Facility: CLINIC | Age: 47
End: 2022-05-02
Payer: COMMERCIAL

## 2022-05-02 NOTE — TELEPHONE ENCOUNTER
Outgoing call with pt regarding Copaxone refill. Pt says he has doses for this week and next week. Pt stated he did not miss any doses. Pended call out to next week 5/10  to set up refill.

## 2022-05-10 NOTE — TELEPHONE ENCOUNTER
Specialty Pharmacy - Refill Coordination    Specialty Medication Orders Linked to Encounter    Flowsheet Row Most Recent Value   Medication #1 glatiramer (COPAXONE) 40 mg/mL injection (Order#[id number], Rx#[id number])          Refill Questions - Documented Responses    Flowsheet Row Most Recent Value   Patient Availability and HIPAA Verification    Does patient want to proceed with activity? Yes   HIPAA/medical authority confirmed? Yes   Relationship to patient of person spoken to? Self   Refill Screening Questions    Changes to allergies? No   Changes to medications? No   New conditions since last clinic visit? No   Unplanned office visit, urgent care, ED, or hospital admission in the last 4 weeks? No   How does patient/caregiver feel medication is working? Good   Financial problems or insurance changes? No   How many doses of your specialty medications were missed in the last 4 weeks? 0   Would patient like to speak to a pharmacist? No   When does the patient need to receive the medication? 05/18/22   Refill Delivery Questions    How will the patient receive the medication? Delivery Hodan   When does the patient need to receive the medication? 05/18/22   Shipping Address Home   Address in [facility] confirmed and updated if neccessary? Yes   Expected Copay ($) 0   Is the patient able to afford the medication copay? Yes   Payment Method zero copay   Days supply of Refill 28   Supplies needed? No supplies needed   Refill activity completed? Yes   Refill activity plan Refill scheduled   Shipment/Pickup Date: 05/13/22          Current Outpatient Medications   Medication Sig    cholecalciferol, vitamin D3, (VITAMIN D3) 50 mcg (2,000 unit) Cap Take 1 capsule by mouth once daily. Take 6000 IU daily.    glatiramer (COPAXONE) 40 mg/mL injection INJECT 40 MG INTO THE SKIN THREE TIMES A WEEK.    lisdexamfetamine (VYVANSE) 40 MG Cap Take 1 capsule (40 mg total) by mouth every morning.    sildenafiL (VIAGRA) 100 MG  tablet TAKE 1 TABLET (100 MG TOTAL) BY MOUTH DAILY AS NEEDED FOR ERECTILE DYSFUNCTION.    tadalafiL (CIALIS) 20 MG Tab Take 1 tablet (20 mg total) by mouth daily as needed (ED).   Last reviewed on 2/3/2022  9:33 AM by Katrin Sesay, APRN, CNS    Review of patient's allergies indicates:   Allergen Reactions    Proamatine [midodrine] Other (See Comments)     Vasectomy    Last reviewed on  2/3/2022 9:33 AM by Katrin Sesay      Tasks added this encounter   6/8/2022 - Refill Call (Auto Added)   Tasks due within next 3 months   No tasks due.     Alex Zuniga, PharmD  [facility]  [address] [facility] [address]  Phone: [phone]  Fax: [phone]

## 2022-05-16 DIAGNOSIS — G35 MS (MULTIPLE SCLEROSIS): ICD-10-CM

## 2022-05-16 DIAGNOSIS — N52.9 ERECTILE DYSFUNCTION, UNSPECIFIED ERECTILE DYSFUNCTION TYPE: ICD-10-CM

## 2022-05-16 RX ORDER — TADALAFIL 20 MG/1
20 TABLET ORAL DAILY PRN
Qty: 6 TABLET | OUTPATIENT
Start: 2022-05-16 | End: 2023-05-16

## 2022-06-02 ENCOUNTER — SPECIALTY PHARMACY (OUTPATIENT)
Dept: PHARMACY | Facility: CLINIC | Age: 47
End: 2022-06-02
Payer: COMMERCIAL

## 2022-06-02 DIAGNOSIS — G35 MULTIPLE SCLEROSIS: Primary | ICD-10-CM

## 2022-06-02 NOTE — TELEPHONE ENCOUNTER
Specialty Pharmacy - Clinical Reassessment    Specialty Medication Orders Linked to Encounter    Flowsheet Row Most Recent Value   Medication #1 glatiramer (COPAXONE) 40 mg/mL injection (Order#[id number], Rx#[id number])        Patient Diagnosis   G35 - Multiple sclerosis    Specialty clinical pharmacist review completed for an annual review of reassessment. Reviewed the following areas: current med list, reports of adverse effects, adherence and progress towards therapeutic goals.    Recommendations: Per fill history, patient filled Copaxone a week late last month, but denied missed doses. Intervention opened for assigned Rph to assess adherence at next refill.     Tasks added this encounter   3/2/2023 - Clinical - Follow Up Assesement (Annual)   Tasks due within next 3 months   6/8/2022 - Refill Call (Auto Added)     Selvin Suresh, PharmD  [facility]  [address]  [facility] [address]  Phone: [phone]  Fax: [phone]

## 2022-06-06 ENCOUNTER — LAB VISIT (OUTPATIENT)
Dept: LAB | Facility: HOSPITAL | Age: 47
End: 2022-06-06
Attending: PSYCHIATRY & NEUROLOGY
Payer: COMMERCIAL

## 2022-06-06 ENCOUNTER — OFFICE VISIT (OUTPATIENT)
Dept: NEUROLOGY | Facility: CLINIC | Age: 47
End: 2022-06-06
Payer: COMMERCIAL

## 2022-06-06 ENCOUNTER — PATIENT MESSAGE (OUTPATIENT)
Dept: NEUROLOGY | Facility: CLINIC | Age: 47
End: 2022-06-06

## 2022-06-06 VITALS
HEIGHT: 70 IN | SYSTOLIC BLOOD PRESSURE: 116 MMHG | DIASTOLIC BLOOD PRESSURE: 73 MMHG | HEART RATE: 64 BPM | BODY MASS INDEX: 25.79 KG/M2 | WEIGHT: 180.13 LBS

## 2022-06-06 DIAGNOSIS — Z29.89 PROPHYLACTIC IMMUNOTHERAPY: ICD-10-CM

## 2022-06-06 DIAGNOSIS — T45.2X1D POISONING BY VITAMIN D, ACCIDENTAL OR UNINTENTIONAL, SUBSEQUENT ENCOUNTER: ICD-10-CM

## 2022-06-06 DIAGNOSIS — G35 MULTIPLE SCLEROSIS: Primary | ICD-10-CM

## 2022-06-06 DIAGNOSIS — Z71.89 COUNSELING REGARDING GOALS OF CARE: ICD-10-CM

## 2022-06-06 DIAGNOSIS — R53.83 FATIGUE, UNSPECIFIED TYPE: ICD-10-CM

## 2022-06-06 DIAGNOSIS — F43.20 ADJUSTMENT DISORDER, UNSPECIFIED TYPE: ICD-10-CM

## 2022-06-06 LAB — 25(OH)D3+25(OH)D2 SERPL-MCNC: 72 NG/ML (ref 30–96)

## 2022-06-06 PROCEDURE — 1160F RVW MEDS BY RX/DR IN RCRD: CPT | Mod: CPTII,S$GLB,, | Performed by: PSYCHIATRY & NEUROLOGY

## 2022-06-06 PROCEDURE — 3074F SYST BP LT 130 MM HG: CPT | Mod: CPTII,S$GLB,, | Performed by: PSYCHIATRY & NEUROLOGY

## 2022-06-06 PROCEDURE — 99215 OFFICE O/P EST HI 40 MIN: CPT | Mod: S$GLB,,, | Performed by: PSYCHIATRY & NEUROLOGY

## 2022-06-06 PROCEDURE — 1159F MED LIST DOCD IN RCRD: CPT | Mod: CPTII,S$GLB,, | Performed by: PSYCHIATRY & NEUROLOGY

## 2022-06-06 PROCEDURE — 36415 COLL VENOUS BLD VENIPUNCTURE: CPT | Performed by: PSYCHIATRY & NEUROLOGY

## 2022-06-06 PROCEDURE — 3078F PR MOST RECENT DIASTOLIC BLOOD PRESSURE < 80 MM HG: ICD-10-PCS | Mod: CPTII,S$GLB,, | Performed by: PSYCHIATRY & NEUROLOGY

## 2022-06-06 PROCEDURE — 3078F DIAST BP <80 MM HG: CPT | Mod: CPTII,S$GLB,, | Performed by: PSYCHIATRY & NEUROLOGY

## 2022-06-06 PROCEDURE — 99999 PR PBB SHADOW E&M-EST. PATIENT-LVL III: CPT | Mod: PBBFAC,,, | Performed by: PSYCHIATRY & NEUROLOGY

## 2022-06-06 PROCEDURE — 1160F PR REVIEW ALL MEDS BY PRESCRIBER/CLIN PHARMACIST DOCUMENTED: ICD-10-PCS | Mod: CPTII,S$GLB,, | Performed by: PSYCHIATRY & NEUROLOGY

## 2022-06-06 PROCEDURE — 3008F BODY MASS INDEX DOCD: CPT | Mod: CPTII,S$GLB,, | Performed by: PSYCHIATRY & NEUROLOGY

## 2022-06-06 PROCEDURE — 1159F PR MEDICATION LIST DOCUMENTED IN MEDICAL RECORD: ICD-10-PCS | Mod: CPTII,S$GLB,, | Performed by: PSYCHIATRY & NEUROLOGY

## 2022-06-06 PROCEDURE — 99999 PR PBB SHADOW E&M-EST. PATIENT-LVL III: ICD-10-PCS | Mod: PBBFAC,,, | Performed by: PSYCHIATRY & NEUROLOGY

## 2022-06-06 PROCEDURE — 3074F PR MOST RECENT SYSTOLIC BLOOD PRESSURE < 130 MM HG: ICD-10-PCS | Mod: CPTII,S$GLB,, | Performed by: PSYCHIATRY & NEUROLOGY

## 2022-06-06 PROCEDURE — 3008F PR BODY MASS INDEX (BMI) DOCUMENTED: ICD-10-PCS | Mod: CPTII,S$GLB,, | Performed by: PSYCHIATRY & NEUROLOGY

## 2022-06-06 PROCEDURE — 99215 PR OFFICE/OUTPT VISIT, EST, LEVL V, 40-54 MIN: ICD-10-PCS | Mod: S$GLB,,, | Performed by: PSYCHIATRY & NEUROLOGY

## 2022-06-06 PROCEDURE — 82306 VITAMIN D 25 HYDROXY: CPT | Performed by: PSYCHIATRY & NEUROLOGY

## 2022-06-06 NOTE — PROGRESS NOTES
Subjective:          Patient ID: Dory Raza is a 47 y.o. male who presents today for a routine clinic visit for MS. He last saw Katrin on February 3,2022       MS HPI:  · DMT: glatiramer acetate  · Side effects from DMT? No  · Taking vitamin D3 as recommended? Yes - 5000 IU M, F, W only   · No new neurological symptoms--> states he wouldn't know if he had MS considering how well he is doing   · Complains of sexual dysfunction--> he was referred to Dr Earnestine Poon with urology about a year ago. 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 Had a gastric sleeve  last year. He will complain about stiffness after exercising. We have went over the benefits of stretching and yoga     Medications:  Current Outpatient Medications   Medication Sig    cholecalciferol, vitamin D3, (VITAMIN D3) 50 mcg (2,000 unit) Cap Take 1 capsule by mouth once daily. Take 6000 IU daily.    glatiramer (COPAXONE) 40 mg/mL injection INJECT 40 MG INTO THE SKIN THREE TIMES A WEEK.    lisdexamfetamine (VYVANSE) 40 MG Cap Take 1 capsule (40 mg total) by mouth every morning.    sildenafiL (VIAGRA) 100 MG tablet TAKE 1 TABLET (100 MG TOTAL) BY MOUTH DAILY AS NEEDED FOR ERECTILE DYSFUNCTION.    tadalafiL (CIALIS) 20 MG Tab Take 1 tablet (20 mg total) by mouth daily as needed (ED).     No current facility-administered medications for this visit.       SOCIAL HISTORY  Social History     Tobacco Use    Smoking status: Former Smoker     Types: Cigarettes     Quit date: 2010     Years since quittin.3    Smokeless tobacco: Never Used   Substance Use Topics    Alcohol use: Yes     Alcohol/week: 0.0 standard drinks     Comment:  socially    Drug use: No       Living arrangements - the patient lives with their spouse and daughter       REVIEW OF SYMPTOMS 6/5/2022   Do you feel abnormally tired on most days? No   Do you feel you generally sleep well? Yes   Do you have difficulty controlling your bladder?  No   Do you have difficulty controlling your bowels?  No   Do you have frequent muscle cramps, tightness or spasms in your limbs?  No   Do you have new visual symptoms?  No   Do you have worsening difficulty with your memory or thinking? No   Do you have worsening symptoms of anxiety or depression?  No   For patients who walk, Do you have more difficulty walking?  No   Have you fallen since your last visit?  No   For patients who use wheelchairs: Do you have any skin wounds or breakdown? Not Applicable   Do you have difficulty using your hands?  No   Do you have shooting or burning pain? No   Do you have difficulty with sexual function?  Yes   If you are sexually active, are you using birth control? Y/N  N/A No   Do you often choke when swallowing liquids or solid food?  No   Do you experience worsening symptoms when overheated? No   Do you need any new equipment such as a wheelchair, walker or shower chair? No   Do you receive co-pay financial assistance for your principal MS medicine? Yes   Would you be interested in participating in an MS research trial in the future? Yes   For patients on Gilenya, Tecfidera, Aubagio, Rituxan, Ocrevus, Tysabri, Lemtrada or Methotrexate, are you aware that you should NOT receive live virus vaccines?  Not Applicable   Do you feel you have adequate family/friend support?  Yes   Do you have health insurance?   Yes   Are you currently employed? Yes   Do you receive SSDI/SSI?  Not Applicable   Do you use marijuana or cannabis products? No   Have you been diagnosed with a urinary tract infection since your last visit here? No   Have you been diagnosed with a respiratory tract infection since your last visit  here? No   Have you been to the emergency room since your last visit here? No   Have you been hospitalized since your last visit here?  No                Objective:        Timed 25 Foot Walk: 11/3/2021 2022   Did patient wear an AFO? No No   Was assistive device used? No No   Time for 25 Foot Walk (seconds) 3 3.1   Time for 25 Foot Walk (seconds) 2.7 3         Neurologic Exam     Mental Status   Oriented to person, place, and time.   Attention: normal.   Speech: speech is normal   Level of consciousness: alert  Knowledge: good.   Able to name object. Able to read.     Cranial Nerves     CN II   Right visual field deficit: none  Left visual field deficit: none     CN III, IV, VI   Pupils are equal, round, and reactive to light.  Extraocular motions are normal.   Ophthalmoparesis: none    CN V   Facial sensation intact.     CN VII   Facial expression full, symmetric.     CN VIII   CN VIII normal.     CN XI   CN XI normal.     Motor Exam   Muscle bulk: normal  Overall muscle tone: normal  Right arm tone: normal  Left arm tone: normal  Right leg tone: normal  Left leg tone: normal    Strength   Right neck flexion: 5/5  Left neck flexion: 5/5  Right neck extension: 5/5  Left neck extension: 5/5  Right deltoid: 5/5  Left deltoid: 5/5  Right biceps: 5/5  Left biceps: 5/5  Right triceps: 5/5  Left triceps: 5/5  Right wrist flexion: 5/5  Left wrist flexion: 5/5  Right wrist extension: 5/5  Left wrist extension: 5/5  Right interossei: 5/5  Left interossei: 5/5  Right abdominals: 5/5  Left abdominals: 5/5  Right iliopsoas: 5/5  Left iliopsoas: 5/5  Right quadriceps: 5/5  Left quadriceps: 5/5  Right hamstrin/5  Left hamstrin/5  Right glutei: 5/5  Left glutei: 5/5  Right anterior tibial: 5/5  Left anterior tibial: 5/5  Right posterior tibial: 5/5  Left posterior tibial: 5/5  Right peroneal: 5/5  Left peroneal: 5/5  Right gastroc: 5/5  Left gastroc: 5/5    Sensory Exam   Light touch normal.   Vibration normal.    Proprioception normal.     Gait, Coordination, and Reflexes     Gait  Gait: normal    Coordination   Romberg: negative  Finger to nose coordination: normal    Tremor   Resting tremor: absent  Intention tremor: absent    Reflexes   Right patellar: 2+  Left patellar: 2+        Imaging:     Results for orders placed during the hospital encounter of 10/15/21    MRI Brain Demyelinating Without Contrast    Impression  Stable single right periventricular white matter lesion, which demonstrates no significant interval change as compared to multiple prior exams dating back to 2014, in keeping with the patient's history of multiple sclerosis.  No new lesion or diffusion restriction to suggest active demyelination.      Electronically signed by: Channing Chase  Date:    10/15/2021  Time:    14:46    Results for orders placed during the hospital encounter of 09/24/20    MRI Cervical Spine Demyelinating Without Contrast    Impression  1. The axial images are motion degraded.  This somewhat limits evaluation of foraminal stenosis.  Otherwise, there is no fracture or malalignment.  There is no spinal stenosis or cord compression.  2. There are stable regions of abnormal signal intensity in the cord.  Again, the largest lesion is seen in the posterior central cord at the level of C3-4.  There is a 2nd punctate focus of abnormal signal in the right posterior cord at the level of inferior C3.  These findings are consistent with the provided diagnosis of multiple sclerosis without obvious interval progression.  3. There is multilevel degenerative change.  There is however no significant spinal stenosis at any level.  4. There is mild-to-moderate left foraminal narrowing at the C3-4 level.  There is stable moderate left foraminal stenosis at the C4-5 level.  5. There may be mild bilateral foraminal narrowing at the C6-7 level.  Please see above discussion.      Electronically signed by: Abdirashid Barnes  MD  Date:    09/24/2020  Time:    10:45          Labs:     Lab Results   Component Value Date    [id number] 72 06/06/2022    PLYWSVHS72GQ 109 (H) 11/03/2021    [id number] 48 09/24/2020     No results found for: JCVINDEX, JCVANTIBODY  No results found for: JR3HEYZQ, ABSOLUTECD3, ZZ4ZSYHM, ABSOLUTECD8, XK9OROBA, ABSOLUTECD4, LABCD48  Lab Results   Component Value Date    WBC 5.79 11/03/2021    RBC 5.42 11/03/2021    HGB 15.4 11/03/2021    HCT 46.6 11/03/2021    MCV 86 11/03/2021    MCH 28.4 11/03/2021    MCHC 33.0 11/03/2021    RDW 12.7 11/03/2021     11/03/2021    MPV 10.0 11/03/2021    GRAN 3.6 11/03/2021    GRAN 61.4 11/03/2021    LYMPH 1.7 11/03/2021    LYMPH 29.4 11/03/2021    MONO 0.4 11/03/2021    MONO 7.1 11/03/2021    EOS 0.1 11/03/2021    BASO 0.06 11/03/2021    EOSINOPHIL 0.9 11/03/2021    BASOPHIL 1.0 11/03/2021     Sodium   Date Value Ref Range Status   11/03/2021 143 136 - 145 mmol/L Final     Potassium   Date Value Ref Range Status   11/03/2021 4.6 3.5 - 5.1 mmol/L Final     Chloride   Date Value Ref Range Status   11/03/2021 104 95 - 110 mmol/L Final     CO2   Date Value Ref Range Status   11/03/2021 32 (H) 23 - 29 mmol/L Final     Glucose   Date Value Ref Range Status   11/03/2021 92 70 - 110 mg/dL Final     BUN   Date Value Ref Range Status   11/03/2021 8 6 - 20 mg/dL Final     Creatinine   Date Value Ref Range Status   11/03/2021 1.0 0.5 - 1.4 mg/dL Final     Calcium   Date Value Ref Range Status   11/03/2021 9.9 8.7 - 10.5 mg/dL Final     Total Protein   Date Value Ref Range Status   11/03/2021 6.2 6.0 - 8.4 g/dL Final     Albumin   Date Value Ref Range Status   11/03/2021 4.1 3.5 - 5.2 g/dL Final     Total Bilirubin   Date Value Ref Range Status   11/03/2021 0.9 0.1 - 1.0 mg/dL Final     Comment:     For infants and newborns, interpretation of results should be based  on gestational age, weight and in agreement with clinical  observations.    Premature Infant recommended reference  ranges:  Up to 24 hours.............<8.0 mg/dL  Up to 48 hours............<12.0 mg/dL  3-5 days..................<15.0 mg/dL  6-29 days.................<15.0 mg/dL       Alkaline Phosphatase   Date Value Ref Range Status   11/03/2021 95 55 - 135 U/L Final     AST   Date Value Ref Range Status   11/03/2021 11 10 - 40 U/L Final     ALT   Date Value Ref Range Status   11/03/2021 18 10 - 44 U/L Final     Anion Gap   Date Value Ref Range Status   11/03/2021 7 (L) 8 - 16 mmol/L Final     eGFR if    Date Value Ref Range Status   11/03/2021 >60.0 >60 mL/min/1.73 m^2 Final     eGFR if non    Date Value Ref Range Status   11/03/2021 >60.0 >60 mL/min/1.73 m^2 Final     Comment:     Calculation used to obtain the estimated glomerular filtration  rate (eGFR) is the CKD-EPI equation.        No results found for: HEPBSAG, HEPBSAB, HEPBCAB        MS Impression and Plan:     NEURO MULTIPLE SCLEROSIS IMPRESSION:   MS Status:     Number of relapses in the past year?:  0    Clinical Progression:  Clinically Stable    MRI Progression:  Stable  Plan:     DMT:  No change in management    Symptom Management:  No change in symptom management     Currently stable on copaxone  He does not need any refills   We have sent a referral to a therapist to help him with counseling   Vitamin D labwork sent   Follow up with Katrin in 6 months         Problem List Items Addressed This Visit        1 - High    Multiple sclerosis - Primary    Relevant Orders    Ambulatory referral/consult to Multiple Sclerosis Social Work       Unprioritized    Prophylactic immunotherapy      Other Visit Diagnoses     Counseling regarding goals of care        Fatigue, unspecified type        Adjustment disorder, unspecified type        Relevant Orders    Ambulatory referral/consult to Multiple Sclerosis Social Work    Poisoning by vitamin D, accidental or unintentional, subsequent encounter        Relevant Orders    Vitamin D (Completed)           Ale Hernandez MD    I spent a total of 40 minutes on the day of the visit.This includes face to face time and non-face to face time preparing to see the patient (eg, review of tests), obtaining and/or reviewing separately obtained history, documenting clinical information in the electronic or other health record, independently interpreting results and communicating results to the patient/family/caregiver, or care coordinator.

## 2022-06-08 ENCOUNTER — SPECIALTY PHARMACY (OUTPATIENT)
Dept: PHARMACY | Facility: CLINIC | Age: 47
End: 2022-06-08
Payer: COMMERCIAL

## 2022-06-08 NOTE — TELEPHONE ENCOUNTER
Specialty Pharmacy - Refill Coordination    Specialty Medication Orders Linked to Encounter    Flowsheet Row Most Recent Value   Medication #1 glatiramer (COPAXONE) 40 mg/mL injection (Order#[id number], Rx#[id number])      Specialty Pharmacy - Patient Intervention    Patient education provided:  Drug therapy adherence: Drug administration    Impact on patient care:  Potentially improved therapy adherence    Additional Notes  During [facility] f/u assessment, patient filled Copaxone a week late last month, but denied missed doses. During refill for Copaxone, the patient reported ~5 doses on hand (W,F, M, W, F). Advised patient based on his fill history and on-hand count [facility] has noticed inconsistency with filling Copaxone potentially showing non-adherence. Patient adamant he has not missed doses. 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 No further intervention needed at this time.     Refill Questions - Documented Responses    Flowsheet Row Most Recent Value   Patient Availability and HIPAA Verification    Does patient want to proceed with activity? Yes   HIPAA/medical authority confirmed? Yes   Relationship to patient of person spoken to? Self   Refill Screening Questions    Changes to allergies? No   Changes to medications? No   New conditions since last clinic visit? No   Unplanned office visit, urgent care, ED, or hospital admission in the last 4 weeks? No   How does patient/caregiver feel medication is working? Good   Financial problems or insurance changes? No   How many doses of your specialty medications were missed in the last 4 weeks? 0   Would patient like to speak to a pharmacist? No   When does the patient need to receive the medication? 06/20/22   Refill Delivery Questions    How will the patient receive the medication? Delivery Hodan   When does the patient need to receive the medication? 06/20/22   Shipping Address Home   Address in [facility] confirmed and updated if neccessary?  Yes   Expected Copay ($) 0   Is the patient able to afford the medication copay? Yes   Payment Method zero copay   Days supply of Refill 28   Supplies needed? No supplies needed   Refill activity completed? Yes   Refill activity plan Refill scheduled   Shipment/Pickup Date: 06/14/22          Current Outpatient Medications   Medication Sig    cholecalciferol, vitamin D3, (VITAMIN D3) 50 mcg (2,000 unit) Cap Take 1 capsule by mouth once daily. Take 6000 IU daily.    glatiramer (COPAXONE) 40 mg/mL injection INJECT 40 MG INTO THE SKIN THREE TIMES A WEEK.    lisdexamfetamine (VYVANSE) 40 MG Cap Take 1 capsule (40 mg total) by mouth every morning.    sildenafiL (VIAGRA) 100 MG tablet TAKE 1 TABLET (100 MG TOTAL) BY MOUTH DAILY AS NEEDED FOR ERECTILE DYSFUNCTION.    tadalafiL (CIALIS) 20 MG Tab Take 1 tablet (20 mg total) by mouth daily as needed (ED).   Last reviewed on 6/6/2022  2:57 PM by Ale Hernandez MD    Review of patient's allergies indicates:   Allergen Reactions    Proamatine [midodrine] Other (See Comments)     Vasectomy    Last reviewed on  6/6/2022 2:57 PM by Ale Hernandez      Tasks added this encounter   7/11/2022 - Refill Call (Auto Added)   Tasks due within next 3 months   No tasks due.     Alex Zuniga, PharmD  [facility] - [facility] Pharm[facility]  [address] [address]  Phone: [phone]  Fax: [phone]

## 2022-06-13 DIAGNOSIS — R53.83 FATIGUE, UNSPECIFIED TYPE: ICD-10-CM

## 2022-06-13 DIAGNOSIS — G35 MULTIPLE SCLEROSIS: ICD-10-CM

## 2022-06-13 DIAGNOSIS — N52.9 ERECTILE DYSFUNCTION, UNSPECIFIED ERECTILE DYSFUNCTION TYPE: ICD-10-CM

## 2022-06-13 DIAGNOSIS — G35 MS (MULTIPLE SCLEROSIS): ICD-10-CM

## 2022-06-14 RX ORDER — TADALAFIL 20 MG/1
20 TABLET ORAL DAILY PRN
Qty: 6 TABLET | OUTPATIENT
Start: 2022-06-14 | End: 2023-06-14

## 2022-06-14 RX ORDER — LISDEXAMFETAMINE DIMESYLATE 40 MG/1
40 CAPSULE ORAL EVERY MORNING
Qty: 30 CAPSULE | Refills: 0 | Status: SHIPPED | OUTPATIENT
Start: 2022-06-14 | End: 2022-09-08 | Stop reason: SDUPTHER

## 2022-06-21 DIAGNOSIS — N52.9 ERECTILE DYSFUNCTION, UNSPECIFIED ERECTILE DYSFUNCTION TYPE: ICD-10-CM

## 2022-06-21 RX ORDER — SILDENAFIL 100 MG/1
100 TABLET, FILM COATED ORAL DAILY PRN
Qty: 6 TABLET | Refills: 7 | Status: CANCELLED | OUTPATIENT
Start: 2022-06-21 | End: 2023-06-21

## 2022-06-24 ENCOUNTER — PATIENT MESSAGE (OUTPATIENT)
Dept: PSYCHIATRY | Facility: CLINIC | Age: 47
End: 2022-06-24
Payer: COMMERCIAL

## 2022-07-14 ENCOUNTER — SPECIALTY PHARMACY (OUTPATIENT)
Dept: PHARMACY | Facility: CLINIC | Age: 47
End: 2022-07-14
Payer: COMMERCIAL

## 2022-07-14 NOTE — TELEPHONE ENCOUNTER
Specialty Pharmacy - Refill Coordination    Specialty Medication Orders Linked to Encounter    Flowsheet Row Most Recent Value   Medication #1 glatiramer (COPAXONE) 40 mg/mL injection (Order#[id number], Rx#[id number])          Refill Questions - Documented Responses    Flowsheet Row Most Recent Value   Patient Availability and HIPAA Verification    Does patient want to proceed with activity? Yes   HIPAA/medical authority confirmed? Yes   Relationship to patient of person spoken to? Self   Refill Screening Questions    Changes to allergies? No   Changes to medications? No   New conditions since last clinic visit? No   Unplanned office visit, urgent care, ED, or hospital admission in the last 4 weeks? No   How does patient/caregiver feel medication is working? Good   How many doses of your specialty medications were missed in the last 4 weeks? 0   Would patient like to speak to a pharmacist? No   When does the patient need to receive the medication? 07/16/22   Refill Delivery Questions    How will the patient receive the medication? Delivery Hodan   When does the patient need to receive the medication? 07/16/22   Shipping Address Home   Address in [facility] confirmed and updated if neccessary? Yes   Expected Copay ($) 0   Is the patient able to afford the medication copay? Yes   Payment Method zero copay   Days supply of Refill 28   Supplies needed? No supplies needed   Refill activity completed? Yes   Refill activity plan Refill scheduled   Shipment/Pickup Date: 07/14/22          Current Outpatient Medications   Medication Sig    cholecalciferol, vitamin D3, (VITAMIN D3) 50 mcg (2,000 unit) Cap Take 1 capsule by mouth once daily. Take 6000 IU daily.    glatiramer (COPAXONE) 40 mg/mL injection INJECT 40 MG INTO THE SKIN THREE TIMES A WEEK.    lisdexamfetamine (VYVANSE) 40 MG Cap Take 1 capsule (40 mg total) by mouth every morning.    sildenafiL (VIAGRA) 100 MG tablet TAKE 1 TABLET (100 MG TOTAL) BY MOUTH  DAILY AS NEEDED FOR ERECTILE DYSFUNCTION.    tadalafiL (CIALIS) 20 MG Tab Take 1 tablet (20 mg total) by mouth daily as needed (ED).   Last reviewed on 6/6/2022  2:57 PM by Ale Hernandez MD    Review of patient's allergies indicates:   Allergen Reactions    Proamatine [midodrine] Other (See Comments)     Vasectomy    Last reviewed on  6/6/2022 2:57 PM by Ale Hernandez      Tasks added this encounter   8/6/2022 - Refill Call (Auto Added)   Tasks due within next 3 months   No tasks due.     Perla Sagastume [facility]  [address] [place] [address]  Phone: [phone]  Fax: [phone]

## 2022-08-08 DIAGNOSIS — G35 MULTIPLE SCLEROSIS: ICD-10-CM

## 2022-08-08 RX ORDER — GLATIRAMER 40 MG/ML
40 INJECTION, SOLUTION SUBCUTANEOUS
Qty: 36 ML | Refills: 1 | Status: SHIPPED | OUTPATIENT
Start: 2022-08-08 | End: 2022-12-07 | Stop reason: SDUPTHER

## 2022-08-11 ENCOUNTER — SPECIALTY PHARMACY (OUTPATIENT)
Dept: PHARMACY | Facility: CLINIC | Age: 47
End: 2022-08-11
Payer: COMMERCIAL

## 2022-08-11 NOTE — TELEPHONE ENCOUNTER
Specialty Pharmacy - Refill Coordination    Specialty Medication Orders Linked to Encounter    Flowsheet Row Most Recent Value   Medication #1 glatiramer (COPAXONE) 40 mg/mL injection (Order#[id number], Rx#[id number])          Refill Questions - Documented Responses    Flowsheet Row Most Recent Value   Patient Availability and HIPAA Verification    Does patient want to proceed with activity? Yes   HIPAA/medical authority confirmed? Yes   Relationship to patient of person spoken to? Self   Refill Screening Questions    Changes to allergies? No   Changes to medications? No   New conditions since last clinic visit? No   Unplanned office visit, urgent care, ED, or hospital admission in the last 4 weeks? No   How does patient/caregiver feel medication is working? Very good   Financial problems or insurance changes? No   How many doses of your specialty medications were missed in the last 4 weeks? 0   Would patient like to speak to a pharmacist? No   When does the patient need to receive the medication? 08/19/22   Refill Delivery Questions    How will the patient receive the medication? Delivery Hodan   When does the patient need to receive the medication? 08/19/22   Shipping Address Home   Address in [facility] confirmed and updated if neccessary? Yes   Expected Copay ($) 0   Is the patient able to afford the medication copay? Yes   Payment Method zero copay   Days supply of Refill 28   Supplies needed? No supplies needed   Refill activity completed? Yes   Refill activity plan Refill scheduled   Shipment/Pickup Date: 08/16/22          Current Outpatient Medications   Medication Sig    cholecalciferol, vitamin D3, (VITAMIN D3) 50 mcg (2,000 unit) Cap Take 1 capsule by mouth once daily. Take 6000 IU daily.    glatiramer (COPAXONE) 40 mg/mL injection INJECT 40 MG INTO THE SKIN THREE TIMES A WEEK.    lisdexamfetamine (VYVANSE) 40 MG Cap Take 1 capsule (40 mg total) by mouth every morning.    sildenafiL (VIAGRA) 100  MG tablet TAKE 1 TABLET (100 MG TOTAL) BY MOUTH DAILY AS NEEDED FOR ERECTILE DYSFUNCTION.    tadalafiL (CIALIS) 20 MG Tab Take 1 tablet (20 mg total) by mouth daily as needed (ED).   Last reviewed on 6/6/2022  2:57 PM by Ale Hernandez MD    Review of patient's allergies indicates:   Allergen Reactions    Proamatine [midodrine] Other (See Comments)     Vasectomy    Last reviewed on  6/6/2022 2:57 PM by Ale Hernandez      Tasks added this encounter   9/9/2022 - Refill Call (Auto Added)   Tasks due within next 3 months   No tasks due.     Bert Sagastume [facility] - Specialty Pharmacy  [address] [address]  Phone: [phone]  Fax: [phone]

## 2022-09-12 ENCOUNTER — PATIENT MESSAGE (OUTPATIENT)
Dept: PHARMACY | Facility: CLINIC | Age: 47
End: 2022-09-12
Payer: COMMERCIAL

## 2022-09-19 ENCOUNTER — SPECIALTY PHARMACY (OUTPATIENT)
Dept: PHARMACY | Facility: CLINIC | Age: 47
End: 2022-09-19
Payer: COMMERCIAL

## 2022-09-19 NOTE — TELEPHONE ENCOUNTER
Specialty Pharmacy - Refill Coordination    Specialty Medication Orders Linked to Encounter      Flowsheet Row Most Recent Value   Medication #1 glatiramer (COPAXONE) 40 mg/mL injection (Order#[id number], Rx#[id number])            Refill Questions - Documented Responses      Flowsheet Row Most Recent Value   Patient Availability and HIPAA Verification    Does patient want to proceed with activity? Yes   HIPAA/medical authority confirmed? Yes   Relationship to patient of person spoken to? Self   Refill Screening Questions    Changes to allergies? No   Changes to medications? No   New conditions since last clinic visit? No   Unplanned office visit, urgent care, ED, or hospital admission in the last 4 weeks? No   How does patient/caregiver feel medication is working? Good   Financial problems or insurance changes? No   How many doses of your specialty medications were missed in the last 4 weeks? 0   Would patient like to speak to a pharmacist? No   When does the patient need to receive the medication? 09/28/22   Refill Delivery Questions    How will the patient receive the medication? Delivery Hodan   When does the patient need to receive the medication? 09/28/22   Shipping Address Home   Address in [facility] confirmed and updated if neccessary? Yes   Expected Copay ($) 0   Is the patient able to afford the medication copay? Yes   Payment Method zero copay   Days supply of Refill 28   Supplies needed? No supplies needed   Refill activity completed? Yes   Refill activity plan Refill scheduled   Shipment/Pickup Date: 09/22/22            Current Outpatient Medications   Medication Sig    cholecalciferol, vitamin D3, (VITAMIN D3) 50 mcg (2,000 unit) Cap Take 1 capsule by mouth once daily. Take 6000 IU daily.    glatiramer (COPAXONE) 40 mg/mL injection INJECT 40 MG INTO THE SKIN THREE TIMES A WEEK.    lisdexamfetamine (VYVANSE) 40 MG Cap Take 1 capsule (40 mg total) by mouth every morning.    sildenafiL (VIAGRA) 100  MG tablet TAKE 1 TABLET (100 MG TOTAL) BY MOUTH DAILY AS NEEDED FOR ERECTILE DYSFUNCTION.    tadalafiL (CIALIS) 20 MG Tab Take 1 tablet (20 mg total) by mouth daily as needed (ED).   Last reviewed on 6/6/2022  2:57 PM by Ale Hernandez MD    Review of patient's allergies indicates:   Allergen Reactions    Proamatine [midodrine] Other (See Comments)     Vasectomy    Last reviewed on  6/6/2022 2:57 PM by Ale Hernandez      Tasks added this encounter   10/19/2022 - Refill Call (Auto Added)   Tasks due within next 3 months   No tasks due.     Alex Zuniga, PharmD  [facility] - [facility]ty Pharm[facility]  [address] [facility] [address]  Phone: [phone]  Fax: [phone]

## 2022-10-19 ENCOUNTER — SPECIALTY PHARMACY (OUTPATIENT)
Dept: PHARMACY | Facility: CLINIC | Age: 47
End: 2022-10-19
Payer: COMMERCIAL

## 2022-10-19 NOTE — TELEPHONE ENCOUNTER
Specialty Pharmacy - Refill Coordination    Specialty Medication Orders Linked to Encounter      Flowsheet Row Most Recent Value   Medication #1 glatiramer (COPAXONE) 40 mg/mL injection (Order#[id number], Rx#[id number])            Refill Questions - Documented Responses      Flowsheet Row Most Recent Value   Patient Availability and HIPAA Verification    Does patient want to proceed with activity? Yes   HIPAA/medical authority confirmed? Yes   Relationship to patient of person spoken to? Self   Refill Screening Questions    Changes to allergies? No   Changes to medications? No   New conditions since last clinic visit? No   Unplanned office visit, urgent care, ED, or hospital admission in the last 4 weeks? No   How does patient/caregiver feel medication is working? Good   Financial problems or insurance changes? No   How many doses of your specialty medications were missed in the last 4 weeks? 0   Would patient like to speak to a pharmacist? No   When does the patient need to receive the medication? 10/27/22   Refill Delivery Questions    How will the patient receive the medication? MEDRx   When does the patient need to receive the medication? 10/27/22   Shipping Address Home   Address in [facility] confirmed and updated if neccessary? Yes   Expected Copay ($) 0   Is the patient able to afford the medication copay? Yes   Payment Method zero copay   Days supply of Refill 28   Supplies needed? No supplies needed   Refill activity completed? Yes   Refill activity plan Refill scheduled   Shipment/Pickup Date: 10/20/22            Current Outpatient Medications   Medication Sig    cholecalciferol, vitamin D3, (VITAMIN D3) 50 mcg (2,000 unit) Cap Take 1 capsule by mouth once daily. Take 6000 IU daily.    glatiramer (COPAXONE) 40 mg/mL injection INJECT 40 MG INTO THE SKIN THREE TIMES A WEEK.    lisdexamfetamine (VYVANSE) 40 MG Cap Take 1 capsule (40 mg total) by mouth every morning.    sildenafiL (VIAGRA) 100 MG  tablet TAKE 1 TABLET (100 MG TOTAL) BY MOUTH DAILY AS NEEDED FOR ERECTILE DYSFUNCTION.    tadalafiL (CIALIS) 20 MG Tab Take 1 tablet (20 mg total) by mouth daily as needed (ED).   Last reviewed on 6/6/2022  2:57 PM by Ale Hernandez MD    Review of patient's allergies indicates:   Allergen Reactions    Proamatine [midodrine] Other (See Comments)     Vasectomy    Last reviewed on  6/6/2022 2:57 PM by Ael Hernandez      Tasks added this encounter   11/17/2022 - Refill Call (Auto Added)   Tasks due within next 3 months   No tasks due.     [facility] - Specialty Pharmacy  [address] [facility] [address]  Phone: [phone]  Fax: [phone]

## 2022-11-07 ENCOUNTER — PATIENT MESSAGE (OUTPATIENT)
Dept: NEUROLOGY | Facility: CLINIC | Age: 47
End: 2022-11-07
Payer: COMMERCIAL

## 2022-11-07 DIAGNOSIS — G35 MULTIPLE SCLEROSIS: Primary | ICD-10-CM

## 2022-11-17 ENCOUNTER — SPECIALTY PHARMACY (OUTPATIENT)
Dept: PHARMACY | Facility: CLINIC | Age: 47
End: 2022-11-17
Payer: COMMERCIAL

## 2022-11-17 NOTE — TELEPHONE ENCOUNTER
Specialty Pharmacy - Refill Coordination    Specialty Medication Orders Linked to Encounter      Flowsheet Row Most Recent Value   Medication #1 glatiramer (COPAXONE) 40 mg/mL injection (Order#[id number], Rx#[id number])            Refill Questions - Documented Responses      Flowsheet Row Most Recent Value   Patient Availability and HIPAA Verification    Does patient want to proceed with activity? Yes   HIPAA/medical authority confirmed? Yes   Relationship to patient of person spoken to? Self   Refill Screening Questions    Changes to allergies? No   Changes to medications? No   New conditions since last clinic visit? No   Unplanned office visit, urgent care, ED, or hospital admission in the last 4 weeks? No   How does patient/caregiver feel medication is working? Good   Financial problems or insurance changes? No   How many doses of your specialty medications were missed in the last 4 weeks? 0   Would patient like to speak to a pharmacist? No   When does the patient need to receive the medication? 11/26/22   Refill Delivery Questions    How will the patient receive the medication? MEDRx   When does the patient need to receive the medication? 11/26/22   Shipping Address Home   Address in [facility] confirmed and updated if neccessary? Yes   Expected Copay ($) 0   Is the patient able to afford the medication copay? Yes   Payment Method zero copay   Days supply of Refill 28   Supplies needed? No supplies needed   Refill activity completed? Yes   Refill activity plan Refill scheduled   Shipment/Pickup Date: 11/23/22            Current Outpatient Medications   Medication Sig    cholecalciferol, vitamin D3, (VITAMIN D3) 50 mcg (2,000 unit) Cap Take 1 capsule by mouth once daily. Take 6000 IU daily.    glatiramer (COPAXONE) 40 mg/mL injection INJECT 40 MG INTO THE SKIN THREE TIMES A WEEK.    lisdexamfetamine (VYVANSE) 40 MG Cap Take 1 capsule (40 mg total) by mouth every morning.    sildenafiL (VIAGRA) 100 MG  tablet TAKE 1 TABLET (100 MG TOTAL) BY MOUTH DAILY AS NEEDED FOR ERECTILE DYSFUNCTION.    tadalafiL (CIALIS) 20 MG Tab Take 1 tablet (20 mg total) by mouth daily as needed (ED).   Last reviewed on 6/6/2022  2:57 PM by Ale Hernandez MD    Review of patient's allergies indicates:   Allergen Reactions    Proamatine [midodrine] Other (See Comments)     Vasectomy    Last reviewed on  6/6/2022 2:57 PM by Ale Hernandez      Tasks added this encounter   12/17/2022 - Refill Call (Auto Added)   Tasks due within next 3 months   No tasks due.     [facility]  [address] [facility] [address]  Phone: [phone]  Fax: [phone]

## 2022-11-28 ENCOUNTER — HOSPITAL ENCOUNTER (OUTPATIENT)
Dept: RADIOLOGY | Facility: HOSPITAL | Age: 47
Discharge: HOME | End: 2022-11-28
Attending: CLINICAL NURSE SPECIALIST
Payer: COMMERCIAL

## 2022-11-28 DIAGNOSIS — G35 MULTIPLE SCLEROSIS: ICD-10-CM

## 2022-11-28 PROCEDURE — 70551 MRI BRAIN DEMYELINATING WITHOUT CONTRAST: ICD-10-PCS | Mod: 26,,, | Performed by: RADIOLOGY

## 2022-11-28 PROCEDURE — 70551 MRI BRAIN STEM W/O DYE: CPT | Mod: TC,PO

## 2022-11-28 PROCEDURE — 70551 MRI BRAIN STEM W/O DYE: CPT | Mod: 26,,, | Performed by: RADIOLOGY

## 2022-12-01 ENCOUNTER — PATIENT MESSAGE (OUTPATIENT)
Dept: PSYCHIATRY | Facility: CLINIC | Age: 47
End: 2022-12-01
Payer: COMMERCIAL

## 2022-12-05 NOTE — PROGRESS NOTES
Subjective:          Patient ID: Dory Raza is a 47 y.o. male who presents today for a routine clinic visit for MS.  He was last seen by Dr. Hernandez in 2022. The history has been provided by the patient.       MS HPI:  DMT: glatiramer acetate  Side effects from DMT? No  Taking vitamin D3 as recommended? Yes -  Dose: 5000 units daily   He will be graduating from [employer] school next year.   He denies any new or different symptoms.   He exercises, but not as much as he would like to. He is eating well and practicing portion control.   He denies any recent infections.   He works full-time.     Medications:  Current Outpatient Medications   Medication Sig    cholecalciferol, vitamin D3, (VITAMIN D3) 50 mcg (2,000 unit) Cap Take 1 capsule by mouth once daily. Take 6000 IU daily.    glatiramer (COPAXONE) 40 mg/mL injection INJECT 40 MG INTO THE SKIN THREE TIMES A WEEK.    lisdexamfetamine (VYVANSE) 40 MG Cap Take 1 capsule (40 mg total) by mouth every morning.    sildenafiL (VIAGRA) 100 MG tablet TAKE 1 TABLET (100 MG TOTAL) BY MOUTH DAILY AS NEEDED FOR ERECTILE DYSFUNCTION.    tadalafiL (CIALIS) 20 MG Tab Take 1 tablet (20 mg total) by mouth daily as needed (ED).     SOCIAL HISTORY  Social History     Tobacco Use    Smoking status: Former     Types: Cigarettes     Quit date: 2010     Years since quittin.8    Smokeless tobacco: Never   Substance Use Topics    Alcohol use: Yes     Alcohol/week: 0.0 standard drinks     Comment: socially    Drug use: No       Living arrangements - the patient lives with their family.    ROS:  REVIEW OF SYMPTOMS 22    Do you feel abnormally tired on most days? No   Do you feel you generally sleep well? Yes   Do you have difficulty controlling your bladder?  No   Do you have difficulty controlling your bowels?  No   Do you have frequent muscle cramps, tightness or spasms in your limbs?  No   Do you have new visual symptoms?  No--wears glasses    Do you have worsening difficulty  with your memory or thinking? No   Do you have worsening symptoms of anxiety or depression?  No   For patients who walk, Do you have more difficulty walking?  No   Have you fallen since your last visit?  No   For patients who use wheelchairs: Do you have any skin wounds or breakdown? Not Applicable   Do you have difficulty using your hands?  No   Do you have shooting or burning pain? No   Do you have difficulty with sexual function?  Yes--managed on medication; Cialis works better      If you are sexually active, are you using birth control? Y/N  N/A No   Do you often choke when swallowing liquids or solid food?  No   Do you experience worsening symptoms when overheated? No   Do you need any new equipment such as a wheelchair, walker or shower chair? No   Do you receive co-pay financial assistance for your principal MS medicine? Yes   Would you be interested in participating in an MS research trial in the future? Yes   For patients on Gilenya, Tecfidera, Aubagio, Rituxan, Ocrevus, Tysabri, Lemtrada or Methotrexate, are you aware that you should NOT receive live virus vaccines?  Not Applicable   Do you feel you have adequate family/friend support?  Yes   Do you have health insurance?   Yes   Are you currently employed? Yes   Do you receive SSDI/SSI?  Not Applicable   Do you use marijuana or cannabis products? No   Have you been diagnosed with a urinary tract infection since your last visit here? No   Have you been diagnosed with a respiratory tract infection since your last visit here? No   Have you been to the emergency room since your last visit here? No   Have you been hospitalized since your last visit here?  No            Objective:        1. 25 foot timed walk:   Timed 25 Foot Walk: 6/6/2022 12/7/2022   Did patient wear an AFO? No No   Was assistive device used? No No   Time for 25 Foot Walk (seconds) 3.1 2.9   Time for 25 Foot Walk (seconds) 3 2.9       Neurologic Exam     Mental Status   Oriented to person,  place, and time.   Attention: normal. Concentration: normal.   Speech: speech is normal   Level of consciousness: alert  Knowledge: good.   Normal comprehension.     Cranial Nerves     CN II   Visual acuity: normal with correction    CN III, IV, VI   Pupils are equal, round, and reactive to light.  Extraocular motions are normal.   Right pupil: Shape: regular. Reactivity: brisk.   Left pupil: Shape: regular. Reactivity: brisk.   CN III: no CN III palsy  CN VI: no CN VI palsy  Nystagmus: none     CN V   Right facial sensation deficit: none  Left facial sensation deficit: none    CN VII   Right facial weakness: none  Left facial weakness: none    CN VIII   Hearing: intact    CN IX, X   Palate: symmetric    CN XI   Right sternocleidomastoid strength: normal  Left sternocleidomastoid strength: normal  Right trapezius strength: normal  Left trapezius strength: normal    CN XII   Tongue deviation: none    Motor Exam   Muscle bulk: normal  Overall muscle tone: normal  Right arm tone: normal  Left arm tone: normal  Right leg tone: normal  Left leg tone: normal    Strength   Right neck flexion: 5/5  Left neck flexion: 5/5  Right neck extension: 5/5  Left neck extension: 5/5  Right deltoid: 5/5  Left deltoid: 5/5  Right biceps: 5/5  Left biceps: 5/5  Right triceps: 5/5  Left triceps: 5/5  Right wrist flexion: 5/5  Left wrist flexion: 5/5  Right wrist extension: 5/5  Left wrist extension: 5/5  Right interossei: 5/5  Left interossei: 5/5  Right iliopsoas: 5/5  Left iliopsoas: 5/5  Right quadriceps: 5/5  Left quadriceps: 5/5  Right hamstrin/5  Left hamstrin/5  Right anterior tibial: 5/5  Left anterior tibial: 5/5  Right gastroc: 5/5  Left gastroc: 5/5    Sensory Exam   Right arm vibration: normal  Left arm vibration: normal  Right leg vibration: normal  Left leg vibration: normal    Gait, Coordination, and Reflexes     Gait  Gait: normal    Coordination   Romberg: negative  Finger to nose coordination: normal  Heel to  shin coordination: normal  Tandem walking coordination: normal    Tremor   Resting tremor: absent    Reflexes   Right brachioradialis: 2+  Left brachioradialis: 2+  Right biceps: 2+  Left biceps: 2+  Right triceps: 2+  Left triceps: 2+  Right patellar: 2+  Left patellar: 2+  Right achilles: 2+  Left achilles: 2+  He is able to hop on each foot ten times.   Normal rapid sequential movements in upper and lower extremities.        Imaging:     Results for orders placed during the hospital encounter of 11/28/22    MRI Brain Demyelinating Without Contrast    Impression  Brain appears stable from prior exam, with unchanged single lesion in the posterior right periventricular/deep white matter compatible with the reported history of multiple sclerosis.  No new discrete lesions to indicate ongoing demyelination.      Electronically signed by: Channing Chase  Date:    11/28/2022  Time:    18:02    Images have been reviewed with the patient.   Labs:     Lab Results   Component Value Date    HIUJRLAG75VB 72 06/06/2022    MSGQXEPV30XW 109 (H) 11/03/2021    NGHCUIVS10KM 48 09/24/2020     Lab Results   Component Value Date    WBC 5.79 11/03/2021    RBC 5.42 11/03/2021    HGB 15.4 11/03/2021    HCT 46.6 11/03/2021    MCV 86 11/03/2021    MCH 28.4 11/03/2021    MCHC 33.0 11/03/2021    RDW 12.7 11/03/2021     11/03/2021    MPV 10.0 11/03/2021    GRAN 3.6 11/03/2021    GRAN 61.4 11/03/2021    LYMPH 1.7 11/03/2021    LYMPH 29.4 11/03/2021    MONO 0.4 11/03/2021    MONO 7.1 11/03/2021    EOS 0.1 11/03/2021    BASO 0.06 11/03/2021    EOSINOPHIL 0.9 11/03/2021    BASOPHIL 1.0 11/03/2021     Sodium   Date Value Ref Range Status   11/03/2021 143 136 - 145 mmol/L Final     Potassium   Date Value Ref Range Status   11/03/2021 4.6 3.5 - 5.1 mmol/L Final     Chloride   Date Value Ref Range Status   11/03/2021 104 95 - 110 mmol/L Final     CO2   Date Value Ref Range Status   11/03/2021 32 (H) 23 - 29 mmol/L Final     Glucose   Date Value  Ref Range Status   11/03/2021 92 70 - 110 mg/dL Final     BUN   Date Value Ref Range Status   11/03/2021 8 6 - 20 mg/dL Final     Creatinine   Date Value Ref Range Status   11/03/2021 1.0 0.5 - 1.4 mg/dL Final     Calcium   Date Value Ref Range Status   11/03/2021 9.9 8.7 - 10.5 mg/dL Final     Total Protein   Date Value Ref Range Status   11/03/2021 6.2 6.0 - 8.4 g/dL Final     Albumin   Date Value Ref Range Status   11/03/2021 4.1 3.5 - 5.2 g/dL Final     Total Bilirubin   Date Value Ref Range Status   11/03/2021 0.9 0.1 - 1.0 mg/dL Final     Comment:     For infants and newborns, interpretation of results should be based  on gestational age, weight and in agreement with clinical  observations.    Premature Infant recommended reference ranges:  Up to 24 hours.............<8.0 mg/dL  Up to 48 hours............<12.0 mg/dL  3-5 days..................<15.0 mg/dL  6-29 days.................<15.0 mg/dL       Alkaline Phosphatase   Date Value Ref Range Status   11/03/2021 95 55 - 135 U/L Final     AST   Date Value Ref Range Status   11/03/2021 11 10 - 40 U/L Final     ALT   Date Value Ref Range Status   11/03/2021 18 10 - 44 U/L Final     Anion Gap   Date Value Ref Range Status   11/03/2021 7 (L) 8 - 16 mmol/L Final     eGFR if    Date Value Ref Range Status   11/03/2021 >60.0 >60 mL/min/1.73 m^2 Final     eGFR if non    Date Value Ref Range Status   11/03/2021 >60.0 >60 mL/min/1.73 m^2 Final     Comment:     Calculation used to obtain the estimated glomerular filtration  rate (eGFR) is the CKD-EPI equation.          MS Impression and Plan:     NEURO MULTIPLE SCLEROSIS IMPRESSION:   MS Status:     Number of relapses in the past year?:  0    Clinical Progression:  Clinically Stable    MRI Progression:  Stable  Plan:     DMT:  No change in management    DMT comment:  Continue glatiramer and Vitamin D. Refill for glatiramer sent to pharmacy.       Symptom Management:  No change in symptom  management       Next brain MRI in November 2023  He will follow up with Dr. Hernandez in 6 months.     Total time spent with patient: 32 minutes   Total time spent on encounter: 37 minutes           Problem List Items Addressed This Visit          Neurologic Problems    Multiple sclerosis - Primary    Relevant Orders    Vitamin D     Other Visit Diagnoses       Fatigue, unspecified type        Erectile dysfunction, unspecified erectile dysfunction type        MS (multiple sclerosis)                RUSSEL Cowan, CNS

## 2022-12-07 ENCOUNTER — OFFICE VISIT (OUTPATIENT)
Dept: NEUROLOGY | Facility: CLINIC | Age: 47
End: 2022-12-07
Payer: COMMERCIAL

## 2022-12-07 ENCOUNTER — PATIENT MESSAGE (OUTPATIENT)
Dept: NEUROLOGY | Facility: CLINIC | Age: 47
End: 2022-12-07

## 2022-12-07 VITALS
HEIGHT: 70 IN | WEIGHT: 190.38 LBS | HEART RATE: 74 BPM | SYSTOLIC BLOOD PRESSURE: 119 MMHG | BODY MASS INDEX: 27.25 KG/M2 | DIASTOLIC BLOOD PRESSURE: 73 MMHG

## 2022-12-07 DIAGNOSIS — G35 MULTIPLE SCLEROSIS: Primary | ICD-10-CM

## 2022-12-07 DIAGNOSIS — Z29.89 PROPHYLACTIC IMMUNOTHERAPY: ICD-10-CM

## 2022-12-07 DIAGNOSIS — R53.83 FATIGUE, UNSPECIFIED TYPE: ICD-10-CM

## 2022-12-07 DIAGNOSIS — N52.9 ERECTILE DYSFUNCTION, UNSPECIFIED ERECTILE DYSFUNCTION TYPE: ICD-10-CM

## 2022-12-07 DIAGNOSIS — Z71.89 COUNSELING REGARDING GOALS OF CARE: ICD-10-CM

## 2022-12-07 PROCEDURE — 99214 OFFICE O/P EST MOD 30 MIN: CPT | Mod: S$GLB,,, | Performed by: CLINICAL NURSE SPECIALIST

## 2022-12-07 PROCEDURE — 99214 PR OFFICE/OUTPT VISIT, EST, LEVL IV, 30-39 MIN: ICD-10-PCS | Mod: S$GLB,,, | Performed by: CLINICAL NURSE SPECIALIST

## 2022-12-07 PROCEDURE — 99999 PR PBB SHADOW E&M-EST. PATIENT-LVL III: CPT | Mod: PBBFAC,,, | Performed by: CLINICAL NURSE SPECIALIST

## 2022-12-07 PROCEDURE — 1159F MED LIST DOCD IN RCRD: CPT | Mod: CPTII,S$GLB,, | Performed by: CLINICAL NURSE SPECIALIST

## 2022-12-07 PROCEDURE — 3078F PR MOST RECENT DIASTOLIC BLOOD PRESSURE < 80 MM HG: ICD-10-PCS | Mod: CPTII,S$GLB,, | Performed by: CLINICAL NURSE SPECIALIST

## 2022-12-07 PROCEDURE — 3008F PR BODY MASS INDEX (BMI) DOCUMENTED: ICD-10-PCS | Mod: CPTII,S$GLB,, | Performed by: CLINICAL NURSE SPECIALIST

## 2022-12-07 PROCEDURE — 3074F SYST BP LT 130 MM HG: CPT | Mod: CPTII,S$GLB,, | Performed by: CLINICAL NURSE SPECIALIST

## 2022-12-07 PROCEDURE — 3008F BODY MASS INDEX DOCD: CPT | Mod: CPTII,S$GLB,, | Performed by: CLINICAL NURSE SPECIALIST

## 2022-12-07 PROCEDURE — 3078F DIAST BP <80 MM HG: CPT | Mod: CPTII,S$GLB,, | Performed by: CLINICAL NURSE SPECIALIST

## 2022-12-07 PROCEDURE — 99999 PR PBB SHADOW E&M-EST. PATIENT-LVL III: ICD-10-PCS | Mod: PBBFAC,,, | Performed by: CLINICAL NURSE SPECIALIST

## 2022-12-07 PROCEDURE — 3074F PR MOST RECENT SYSTOLIC BLOOD PRESSURE < 130 MM HG: ICD-10-PCS | Mod: CPTII,S$GLB,, | Performed by: CLINICAL NURSE SPECIALIST

## 2022-12-07 PROCEDURE — 1159F PR MEDICATION LIST DOCUMENTED IN MEDICAL RECORD: ICD-10-PCS | Mod: CPTII,S$GLB,, | Performed by: CLINICAL NURSE SPECIALIST

## 2022-12-07 RX ORDER — LISDEXAMFETAMINE DIMESYLATE 40 MG/1
40 CAPSULE ORAL EVERY MORNING
Qty: 30 CAPSULE | Refills: 0 | Status: SHIPPED | OUTPATIENT
Start: 2022-12-07 | End: 2023-01-06

## 2022-12-07 RX ORDER — GLATIRAMER 40 MG/ML
40 INJECTION, SOLUTION SUBCUTANEOUS
Qty: 36 ML | Refills: 1 | Status: SHIPPED | OUTPATIENT
Start: 2022-12-07 | End: 2023-06-15 | Stop reason: SDUPTHER

## 2022-12-07 RX ORDER — TADALAFIL 20 MG/1
20 TABLET ORAL DAILY PRN
Qty: 6 TABLET | Status: SHIPPED | OUTPATIENT
Start: 2022-12-07 | End: 2023-11-08 | Stop reason: SDUPTHER

## 2022-12-07 RX ORDER — LISDEXAMFETAMINE DIMESYLATE 40 MG/1
40 CAPSULE ORAL EVERY MORNING
Qty: 30 CAPSULE | Refills: 0 | Status: SHIPPED | OUTPATIENT
Start: 2023-02-05 | End: 2023-05-30 | Stop reason: SDUPTHER

## 2022-12-07 RX ORDER — LISDEXAMFETAMINE DIMESYLATE 40 MG/1
40 CAPSULE ORAL EVERY MORNING
Qty: 30 CAPSULE | Refills: 0 | Status: SHIPPED | OUTPATIENT
Start: 2023-01-06 | End: 2023-02-05

## 2022-12-19 ENCOUNTER — SPECIALTY PHARMACY (OUTPATIENT)
Dept: PHARMACY | Facility: CLINIC | Age: 47
End: 2022-12-19
Payer: COMMERCIAL

## 2022-12-19 NOTE — TELEPHONE ENCOUNTER
Specialty Pharmacy - Refill Coordination    Specialty Medication Orders Linked to Encounter      Flowsheet Row Most Recent Value   Medication #1 glatiramer (COPAXONE) 40 mg/mL injection (Order#[id number], Rx#[id number])            Refill Questions - Documented Responses      Flowsheet Row Most Recent Value   Patient Availability and HIPAA Verification    Does patient want to proceed with activity? Yes   HIPAA/medical authority confirmed? Yes   Relationship to patient of person spoken to? Self   Refill Screening Questions    Changes to allergies? No   Changes to medications? No   New conditions since last clinic visit? No   Unplanned office visit, urgent care, ED, or hospital admission in the last 4 weeks? No   How does patient/caregiver feel medication is working? Good   Financial problems or insurance changes? No   How many doses of your specialty medications were missed in the last 4 weeks? 0   Would patient like to speak to a pharmacist? No   When does the patient need to receive the medication? 12/26/22   Refill Delivery Questions    How will the patient receive the medication? MEDRx   When does the patient need to receive the medication? 12/26/22   Shipping Address Home   Address in [facility] confirmed and updated if neccessary? Yes   Expected Copay ($) 0   Is the patient able to afford the medication copay? Yes   Payment Method zero copay   Days supply of Refill 28   Supplies needed? No supplies needed   Refill activity completed? Yes   Refill activity plan Refill scheduled   Shipment/Pickup Date: 12/21/22            Current Outpatient Medications   Medication Sig    cholecalciferol, vitamin D3, (VITAMIN D3) 50 mcg (2,000 unit) Cap Take 1 capsule by mouth once daily. Take 6000 IU daily.    glatiramer (COPAXONE) 40 mg/mL injection INJECT 40 MG INTO THE SKIN THREE TIMES A WEEK.    lisdexamfetamine (VYVANSE) 40 MG Cap Take 1 capsule (40 mg total) by mouth every morning.    [START ON 1/6/2023]  lisdexamfetamine (VYVANSE) 40 MG Cap Take 1 capsule (40 mg total) by mouth every morning.    [START ON 2/5/2023] lisdexamfetamine (VYVANSE) 40 MG Cap Take 1 capsule (40 mg total) by mouth every morning.    tadalafiL (CIALIS) 20 MG Tab Take 1 tablet (20 mg total) by mouth daily as needed (ED).   Last reviewed on 12/7/2022 10:22 AM by Katrin Sesay, APRN, CNS    Review of patient's allergies indicates:   Allergen Reactions    Proamatine [midodrine] Other (See Comments)     Vasectomy    Last reviewed on  12/7/2022 10:22 AM by Katrin Sesay      Tasks added this encounter   1/16/2023 - Refill Call (Auto Added)   Tasks due within next 3 months   3/2/2023 - Clinical - Follow Up Assesement (Annual)     [facility] - Specialty Pharmacy  [address] [place], [place] [address]  [facility] [address]  Phone: [phone]  Fax: [phone]

## 2023-01-17 ENCOUNTER — SPECIALTY PHARMACY (OUTPATIENT)
Dept: PHARMACY | Facility: CLINIC | Age: 48
End: 2023-01-17
Payer: COMMERCIAL

## 2023-01-17 NOTE — TELEPHONE ENCOUNTER
Outgoing call regarding copaxone refill; per pt, he has 5 doses on hand (last dose will be on 1/27 and will need more for dose on 1/30); informed him that a PA is required, and once approved [facility] will follow up to schedule delivery; routed to [facility]

## 2023-01-18 NOTE — TELEPHONE ENCOUNTER
Specialty Pharmacy - Refill Coordination  Specialty Pharmacy - Medication/Referral Authorization    Specialty Medication Orders Linked to Encounter      Flowsheet Row Most Recent Value   Medication #1 glatiramer (COPAXONE) 40 mg/mL injection (Order#[id number], Rx#[id number])            Refill Questions - Documented Responses      Flowsheet Row Most Recent Value   Patient Availability and HIPAA Verification    Does patient want to proceed with activity? Yes   HIPAA/medical authority confirmed? Yes   Relationship to patient of person spoken to? Self   Refill Screening Questions    Changes to allergies? No   Changes to medications? No   New conditions since last clinic visit? No   Unplanned office visit, urgent care, ED, or hospital admission in the last 4 weeks? No   How does patient/caregiver feel medication is working? Good   Financial problems or insurance changes? No   How many doses of your specialty medications were missed in the last 4 weeks? 0   Would patient like to speak to a pharmacist? No   When does the patient need to receive the medication? 01/27/23   Refill Delivery Questions    How will the patient receive the medication? MEDRx   When does the patient need to receive the medication? 01/27/23   Shipping Address Home   Address in [facility] confirmed and updated if neccessary? Yes   Expected Copay ($) 0   Is the patient able to afford the medication copay? Yes   Payment Method zero copay   Days supply of Refill 28   Supplies needed? No supplies needed   Refill activity completed? Yes   Refill activity plan Refill scheduled   Shipment/Pickup Date: 01/23/23            Current Outpatient Medications   Medication Sig    cholecalciferol, vitamin D3, (VITAMIN D3) 50 mcg (2,000 unit) Cap Take 1 capsule by mouth once daily. Take 6000 IU daily.    glatiramer (COPAXONE) 40 mg/mL injection INJECT 40 MG INTO THE SKIN THREE TIMES A WEEK.    lisdexamfetamine (VYVANSE) 40 MG Cap Take 1 capsule (40 mg total) by  mouth every morning.    [START ON 2/5/2023] lisdexamfetamine (VYVANSE) 40 MG Cap Take 1 capsule (40 mg total) by mouth every morning.    tadalafiL (CIALIS) 20 MG Tab Take 1 tablet (20 mg total) by mouth daily as needed (ED).   Last reviewed on 12/7/2022 10:22 AM by Katrin Sesay, APRN, CNS    Review of patient's allergies indicates:   Allergen Reactions    Proamatine [midodrine] Other (See Comments)     Vasectomy    Last reviewed on  12/7/2022 10:22 AM by Katrin Sesay      Tasks added this encounter   2/17/2023 - Refill Call (Auto Added)   Tasks due within next 3 months   3/2/2023 - Clinical - Follow Up Assesement (Annual)     Shahnaz Cross, Patient Care Assistant  [facility]  [address]  [facility] [address]  Phone: [phone]  Fax: [phone]

## 2023-01-18 NOTE — TELEPHONE ENCOUNTER
Dory Raza Key: [id number] - PA Case ID: [id number]    PA submitted via Replaced by [facility] on 1/18 for Copaxone

## 2023-02-17 ENCOUNTER — SPECIALTY PHARMACY (OUTPATIENT)
Dept: PHARMACY | Facility: CLINIC | Age: 48
End: 2023-02-17
Payer: COMMERCIAL

## 2023-02-17 DIAGNOSIS — G35 MULTIPLE SCLEROSIS: Primary | ICD-10-CM

## 2023-02-17 NOTE — TELEPHONE ENCOUNTER
Applied for new copay card while pt on phone. Pt must click on link to complete enrollment and copay card will generate. Pt will call [facility] back to provide processing info.  Pt has 3 left on hand (M, W, F dose) will need for 2/27

## 2023-02-17 NOTE — TELEPHONE ENCOUNTER
Specialty Pharmacy - Refill Coordination    Specialty Medication Orders Linked to Encounter      Flowsheet Row Most Recent Value   Medication #1 glatiramer (COPAXONE) 40 mg/mL injection (Order#[id number], Rx#[id number])            Refill Questions - Documented Responses      Flowsheet Row Most Recent Value   Patient Availability and HIPAA Verification    Does patient want to proceed with activity? Yes   HIPAA/medical authority confirmed? Yes   Relationship to patient of person spoken to? Self   Refill Screening Questions    Changes to allergies? No   Changes to medications? No   New conditions since last clinic visit? No   Unplanned office visit, urgent care, ED, or hospital admission in the last 4 weeks? No   How does patient/caregiver feel medication is working? Good   Financial problems or insurance changes? No   How many doses of your specialty medications were missed in the last 4 weeks? 0   Would patient like to speak to a pharmacist? No   When does the patient need to receive the medication? 02/27/23   Refill Delivery Questions    How will the patient receive the medication? MEDRx   When does the patient need to receive the medication? 02/27/23   Shipping Address Home   Address in [facility] confirmed and updated if neccessary? Yes   Expected Copay ($) 243.71   Is the patient able to afford the medication copay? Yes   Payment Method  CC on file   Days supply of Refill 28   Supplies needed? No supplies needed   Refill activity completed? Yes   Refill activity plan Refill scheduled   Shipment/Pickup Date: 02/23/23            Current Outpatient Medications   Medication Sig    cholecalciferol, vitamin D3, (VITAMIN D3) 50 mcg (2,000 unit) Cap Take 1 capsule by mouth once daily. Take 6000 IU daily.    glatiramer (COPAXONE) 40 mg/mL injection INJECT 40 MG INTO THE SKIN THREE TIMES A WEEK.    lisdexamfetamine (VYVANSE) 40 MG Cap Take 1 capsule (40 mg total) by mouth every morning.    tadalafiL  (CIALIS) 20 MG Tab Take 1 tablet (20 mg total) by mouth daily as needed (ED).   Last reviewed on 12/7/2022 10:22 AM by RUSSEL Moreau, CNS    Review of patient's allergies indicates:   Allergen Reactions    Proamatine [midodrine] Other (See Comments)     Vasectomy    Last reviewed on  12/7/2022 10:22 AM by Katrin Sesay      Tasks added this encounter   3/17/2023 - Refill Call (Auto Added)   Tasks due within next 3 months   3/2/2023 - Clinical - Follow Up Assesement (Annual)     Marichuy Hernandez, PharmD  [facility]  [address]  [facility] [address]  Phone: [phone]  Fax: [phone]

## 2023-02-17 NOTE — TELEPHONE ENCOUNTER
Outgoing call: patient stated he has 1 week left on hand, coupon card is  and messaged team about getting a new card. Transferring to [facility].

## 2023-02-20 ENCOUNTER — PATIENT MESSAGE (OUTPATIENT)
Dept: PSYCHIATRY | Facility: CLINIC | Age: 48
End: 2023-02-20
Payer: COMMERCIAL

## 2023-03-02 ENCOUNTER — SPECIALTY PHARMACY (OUTPATIENT)
Dept: PHARMACY | Facility: CLINIC | Age: 48
End: 2023-03-02
Payer: COMMERCIAL

## 2023-03-02 DIAGNOSIS — G35 MULTIPLE SCLEROSIS: Primary | ICD-10-CM

## 2023-03-02 NOTE — TELEPHONE ENCOUNTER
Specialty Pharmacy - Clinical Reassessment    Specialty Medication Orders Linked to Encounter      Flowsheet Row Most Recent Value   Medication #1 glatiramer (COPAXONE) 40 mg/mL injection (Order#[id number], Rx#[id number])          Patient Diagnosis   G35 - Multiple sclerosis    Dory Raza is a 47 y.o. male, who is followed by the specialty pharmacy service for management and education of his Copaxone.  He has been on therapy with Copaxone for 94 months.  I have reviewed his electronic medical record and current medication list and determined that specialty medication adjustment Is not needed at this time.    Patient has not experienced adverse events.  He Is adherent reporting 0 missed doses since last review.  Adherence has been encouraged with the following mechanism(s): routine monthly calls from [facility].  He is meeting goals of therapy and will continue treatment.        2/17/2023 1/18/2023 12/19/2022 11/17/2022 10/19/2022 9/19/2022 8/11/2022   Follow Up Review   # of missed doses 0 0 0 0 0 0 0   New Medications? No No No No No No No   New Conditions? No No No No No No No   New Allergies? No No No No No No No   Med Effective? Good Good Good    Very good Good Good Good Very good   Urgent Care? No No No No No No No   Requested Pharmacist? No No No No No No No       Multiple values from one day are sorted in reverse-chronological order         Therapy is appropriate to continue.    Therapy is effective: Yes  On scale of 1 to 10, how does patient rank quality of life? (10 - Best): Unable to Assess  Recommendations: none at this time.  Review Method: Chart Review    Tasks added this encounter   No tasks added.   Tasks due within next 3 months   3/2/2023 - Clinical - Follow Up Assesement (Annual)  3/17/2023 - Refill Call (Auto Added)     Nori Lazcano, PharmD  [facility]  [address] [place] [address]  Phone: [phone]  Fax: [phone]

## 2023-03-09 ENCOUNTER — OCCUPATIONAL HEALTH (OUTPATIENT)
Dept: URGENT CARE | Facility: CLINIC | Age: 48
End: 2023-03-09

## 2023-03-09 DIAGNOSIS — Z13.9 ENCOUNTER FOR SCREENING: Primary | ICD-10-CM

## 2023-03-09 PROCEDURE — 99080 SPECIAL REPORTS OR FORMS: CPT | Mod: S$GLB,,, | Performed by: PHYSICIAN ASSISTANT

## 2023-03-09 PROCEDURE — 99080 OSHA QUESTIONNAIRE: ICD-10-PCS | Mod: S$GLB,,, | Performed by: PHYSICIAN ASSISTANT

## 2023-03-09 PROCEDURE — 86480 TB TEST CELL IMMUN MEASURE: CPT | Mod: S$GLB,,, | Performed by: PHYSICIAN ASSISTANT

## 2023-03-09 PROCEDURE — 86480 QUANTIFERON GOLD TB: ICD-10-PCS | Mod: S$GLB,,, | Performed by: PHYSICIAN ASSISTANT

## 2023-03-09 PROCEDURE — 99002 MASK FIT-QUALITATIVE: ICD-10-PCS | Mod: S$GLB,,, | Performed by: PHYSICIAN ASSISTANT

## 2023-03-09 PROCEDURE — 92552 PURE TONE AUDIOMETRY AIR: CPT | Mod: S$GLB,,, | Performed by: PHYSICIAN ASSISTANT

## 2023-03-09 PROCEDURE — 99002 DEVICE HANDLING PHYS/QHP: CPT | Mod: S$GLB,,, | Performed by: PHYSICIAN ASSISTANT

## 2023-03-09 PROCEDURE — 92552 AUDIOGRAM OCC MED: ICD-10-PCS | Mod: S$GLB,,, | Performed by: PHYSICIAN ASSISTANT

## 2023-03-13 ENCOUNTER — TELEPHONE (OUTPATIENT)
Dept: URGENT CARE | Facility: CLINIC | Age: 48
End: 2023-03-13
Payer: COMMERCIAL

## 2023-03-17 ENCOUNTER — SPECIALTY PHARMACY (OUTPATIENT)
Dept: PHARMACY | Facility: CLINIC | Age: 48
End: 2023-03-17
Payer: COMMERCIAL

## 2023-03-17 NOTE — TELEPHONE ENCOUNTER
Specialty Pharmacy - Refill Coordination    Specialty Medication Orders Linked to Encounter      Flowsheet Row Most Recent Value   Medication #1 glatiramer (COPAXONE) 40 mg/mL injection (Order#[id number], Rx#[id number])        Pt reports no SE and QOL is a 10    Refill Questions - Documented Responses      Flowsheet Row Most Recent Value   Patient Availability and HIPAA Verification    Does patient want to proceed with activity? Yes   HIPAA/medical authority confirmed? Yes   Relationship to patient of person spoken to? Self   Refill Screening Questions    Changes to allergies? No   Changes to medications? No   New conditions since last clinic visit? No   Unplanned office visit, urgent care, ED, or hospital admission in the last 4 weeks? No   How does patient/caregiver feel medication is working? Good   Financial problems or insurance changes? No   How many doses of your specialty medications were missed in the last 4 weeks? 0   Would patient like to speak to a pharmacist? No   When does the patient need to receive the medication? 03/29/23   Refill Delivery Questions    How will the patient receive the medication? MEDRx   When does the patient need to receive the medication? 03/29/23   Shipping Address Home   Address in [facility] confirmed and updated if neccessary? Yes   Expected Copay ($) 243.71   Is the patient able to afford the medication copay? Yes   Payment Method  CC on file   Days supply of Refill 28   Supplies needed? No supplies needed   Refill activity completed? Yes   Refill activity plan Refill scheduled   Shipment/Pickup Date: 03/23/23            Current Outpatient Medications   Medication Sig    cholecalciferol, vitamin D3, (VITAMIN D3) 50 mcg (2,000 unit) Cap Take 1 capsule by mouth once daily. Take 6000 IU daily.    glatiramer (COPAXONE) 40 mg/mL injection INJECT 40 MG INTO THE SKIN THREE TIMES A WEEK.    lisdexamfetamine (VYVANSE) 40 MG Cap Take 1 capsule (40 mg total) by mouth  every morning.    tadalafiL (CIALIS) 20 MG Tab Take 1 tablet (20 mg total) by mouth daily as needed (ED).   Last reviewed on 12/7/2022 10:22 AM by Katrin Sesay, RUSSEL, CNS    Review of patient's allergies indicates:   Allergen Reactions    Proamatine [midodrine] Other (See Comments)     Vasectomy    Last reviewed on  12/7/2022 10:22 AM by Katrin Sesay      Tasks added this encounter   4/19/2023 - Refill Call (Auto Added)   Tasks due within next 3 months   No tasks due.     Nori Lazcano, PharmD  [facility]  [address] [place] [address]  Phone: [phone]  Fax: [phone]

## 2023-04-19 ENCOUNTER — SPECIALTY PHARMACY (OUTPATIENT)
Dept: PHARMACY | Facility: CLINIC | Age: 48
End: 2023-04-19
Payer: COMMERCIAL

## 2023-04-19 NOTE — TELEPHONE ENCOUNTER
Specialty Pharmacy - Refill Coordination    Specialty Medication Orders Linked to Encounter      Flowsheet Row Most Recent Value   Medication #1 glatiramer (COPAXONE) 40 mg/mL injection (Order#[id number], Rx#[id number])            Refill Questions - Documented Responses      Flowsheet Row Most Recent Value   Patient Availability and HIPAA Verification    Does patient want to proceed with activity? Yes   HIPAA/medical authority confirmed? Yes   Relationship to patient of person spoken to? Self   Refill Screening Questions    Changes to allergies? No   Changes to medications? No   New conditions since last clinic visit? No   Unplanned office visit, urgent care, ED, or hospital admission in the last 4 weeks? No   How does patient/caregiver feel medication is working? Good   Financial problems or insurance changes? No   How many doses of your specialty medications were missed in the last 4 weeks? 0   Would patient like to speak to a pharmacist? No   When does the patient need to receive the medication? 04/28/23   Refill Delivery Questions    How will the patient receive the medication? MEDRx   When does the patient need to receive the medication? 04/28/23   Shipping Address Home   Address in [facility] confirmed and updated if neccessary? Yes   Expected Copay ($) 243.71   Is the patient able to afford the medication copay? Yes   Payment Method  CC on file   Days supply of Refill 28   Supplies needed? No supplies needed   Refill activity completed? Yes   Refill activity plan Refill scheduled   Shipment/Pickup Date: 04/21/23            Current Outpatient Medications   Medication Sig    cholecalciferol, vitamin D3, (VITAMIN D3) 50 mcg (2,000 unit) Cap Take 1 capsule by mouth once daily. Take 6000 IU daily.    glatiramer (COPAXONE) 40 mg/mL injection INJECT 40 MG INTO THE SKIN THREE TIMES A WEEK.    lisdexamfetamine (VYVANSE) 40 MG Cap Take 1 capsule (40 mg total) by mouth every morning.    tadalafiL  (CIALIS) 20 MG Tab Take 1 tablet (20 mg total) by mouth daily as needed (ED).   Last reviewed on 12/7/2022 10:22 AM by RUSSEL Moreau, CNS    Review of patient's allergies indicates:   Allergen Reactions    Proamatine [midodrine] Other (See Comments)     Vasectomy    Last reviewed on  12/7/2022 10:22 AM by Katrin Sesay      Tasks added this encounter   5/19/2023 - Refill Call (Auto Added)   Tasks due within next 3 months   No tasks due.     [facility]  [address] [facility] [address]  Phone: [phone]  Fax: [phone]

## 2023-05-23 ENCOUNTER — SPECIALTY PHARMACY (OUTPATIENT)
Dept: PHARMACY | Facility: CLINIC | Age: 48
End: 2023-05-23
Payer: COMMERCIAL

## 2023-05-23 NOTE — TELEPHONE ENCOUNTER
Specialty Pharmacy - Refill Coordination    Specialty Medication Orders Linked to Encounter      Flowsheet Row Most Recent Value   Medication #1 glatiramer (COPAXONE) 40 mg/mL injection (Order#[id number], Rx#[id number])            Refill Questions - Documented Responses      Flowsheet Row Most Recent Value   Patient Availability and HIPAA Verification    Does patient want to proceed with activity? Yes   HIPAA/medical authority confirmed? Yes   Relationship to patient of person spoken to? Self   Refill Screening Questions    Changes to allergies? No   Changes to medications? No   New conditions since last clinic visit? No   Unplanned office visit, urgent care, ED, or hospital admission in the last 4 weeks? No   How does patient/caregiver feel medication is working? Good   Financial problems or insurance changes? No   How many doses of your specialty medications were missed in the last 4 weeks? 0   Would patient like to speak to a pharmacist? No   When does the patient need to receive the medication? 05/30/23   Refill Delivery Questions    How will the patient receive the medication? MEDRx   When does the patient need to receive the medication? 05/30/23   Shipping Address Home   Address in [facility] confirmed and updated if neccessary? Yes   Expected Copay ($) 243.71   Is the patient able to afford the medication copay? Yes   Payment Method  CC on file   Days supply of Refill 28   Supplies needed? No supplies needed   Refill activity completed? Yes   Refill activity plan Refill scheduled   Shipment/Pickup Date: 05/25/23            Current Outpatient Medications   Medication Sig    cholecalciferol, vitamin D3, (VITAMIN D3) 50 mcg (2,000 unit) Cap Take 1 capsule by mouth once daily. Take 6000 IU daily.    glatiramer (COPAXONE) 40 mg/mL injection INJECT 40 MG INTO THE SKIN THREE TIMES A WEEK.    lisdexamfetamine (VYVANSE) 40 MG Cap Take 1 capsule (40 mg total) by mouth every morning.    tadalafiL  (CIALIS) 20 MG Tab Take 1 tablet (20 mg total) by mouth daily as needed (ED).   Last reviewed on 12/7/2022 10:22 AM by RUSSEL Moreau, CNS    Review of patient's allergies indicates:   Allergen Reactions    Proamatine [midodrine] Other (See Comments)     Vasectomy    Last reviewed on  12/7/2022 10:22 AM by Katrin Sesay      Tasks added this encounter   No tasks added.   Tasks due within next 3 months   5/22/2023 - Refill Coordination Outreach (1 time occurrence)     [facility]  [address] [facility] [address]  Phone: [phone]  Fax: [phone]

## 2023-05-30 ENCOUNTER — PATIENT MESSAGE (OUTPATIENT)
Dept: NEUROLOGY | Facility: CLINIC | Age: 48
End: 2023-05-30
Payer: COMMERCIAL

## 2023-06-15 ENCOUNTER — SPECIALTY PHARMACY (OUTPATIENT)
Dept: PHARMACY | Facility: CLINIC | Age: 48
End: 2023-06-15
Payer: COMMERCIAL

## 2023-06-15 DIAGNOSIS — G35 MULTIPLE SCLEROSIS: ICD-10-CM

## 2023-06-15 RX ORDER — GLATIRAMER 40 MG/ML
40 INJECTION, SOLUTION SUBCUTANEOUS
Qty: 36 ML | Refills: 1 | Status: ACTIVE | OUTPATIENT
Start: 2023-06-16 | End: 2023-12-07 | Stop reason: SDUPTHER

## 2023-06-15 NOTE — TELEPHONE ENCOUNTER
Outgoing call to pt regarding Copaxone. Pt is out of refills, request sent to [facility]. Pt has 4 doses left on hand. Will follow up once refills are received.

## 2023-06-19 NOTE — TELEPHONE ENCOUNTER
Specialty Pharmacy - Refill Coordination    Specialty Medication Orders Linked to Encounter      Flowsheet Row Most Recent Value   Medication #1 glatiramer (COPAXONE) 40 mg/mL injection (Order#[id number], Rx#[id number])            Refill Questions - Documented Responses      Flowsheet Row Most Recent Value   Patient Availability and HIPAA Verification    Does patient want to proceed with activity? Yes   HIPAA/medical authority confirmed? Yes   Relationship to patient of person spoken to? Self   Refill Screening Questions    Changes to allergies? No   Changes to medications? No   New conditions since last clinic visit? No   Unplanned office visit, urgent care, ED, or hospital admission in the last 4 weeks? No   How does patient/caregiver feel medication is working? Good   Financial problems or insurance changes? No   How many doses of your specialty medications were missed in the last 4 weeks? 0   Would patient like to speak to a pharmacist? No   When does the patient need to receive the medication? 06/21/23   Refill Delivery Questions    How will the patient receive the medication? MEDRx   When does the patient need to receive the medication? 06/21/23   Shipping Address Home   Address in [facility] confirmed and updated if neccessary? Yes   Expected Copay ($) 243.71   Is the patient able to afford the medication copay? Yes   Payment Method  CC on file   Days supply of Refill 28   Supplies needed? No supplies needed   Refill activity completed? Yes   Refill activity plan Refill scheduled   Shipment/Pickup Date: 06/20/23            Current Outpatient Medications   Medication Sig    cholecalciferol, vitamin D3, (VITAMIN D3) 50 mcg (2,000 unit) Cap Take 1 capsule by mouth once daily. Take 6000 IU daily.    glatiramer (COPAXONE) 40 mg/mL injection INJECT 40 MG INTO THE SKIN THREE TIMES A WEEK.    lisdexamfetamine (VYVANSE) 40 MG Cap Take 1 capsule (40 mg total) by mouth every morning.    tadalafiL  (CIALIS) 20 MG Tab Take 1 tablet (20 mg total) by mouth daily as needed (ED).   Last reviewed on 12/7/2022 10:22 AM by RUSSEL Moreau, CNS    Review of patient's allergies indicates:   Allergen Reactions    Proamatine [midodrine] Other (See Comments)     Vasectomy    Last reviewed on  12/7/2022 10:22 AM by Katrin Sesay      Tasks added this encounter   No tasks added.   Tasks due within next 3 months   6/19/2023 - Refill Coordination Outreach (1 time occurrence)     [facility]  [address] [facility] [address]  Phone: [phone]  Fax: [phone]

## 2023-07-11 ENCOUNTER — SPECIALTY PHARMACY (OUTPATIENT)
Dept: PHARMACY | Facility: CLINIC | Age: 48
End: 2023-07-11
Payer: COMMERCIAL

## 2023-07-11 NOTE — PROGRESS NOTES
Subjective:          Patient ID: Dory Raza is a 48 y.o. male who presents today for a routine clinic visit for MS.  He was last seen in 2022. The history has been provided by the patient.     MS HPI:  DMT: Glatiramer 40mg three times a week   Side effects from DMT? No   Taking vitamin D3 as recommended? Yes -  Dose: 6000 units daily   He denies any recent infections.   He started going back to the gym regularly.   He denies any new or different neurological symptoms lately.   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     Medications:  Current Outpatient Medications   Medication Sig    cholecalciferol, vitamin D3, (VITAMIN D3) 50 mcg (2,000 unit) Cap Take 1 capsule by mouth once daily. Take 6000 IU daily.    glatiramer (COPAXONE) 40 mg/mL injection INJECT 40 MG INTO THE SKIN THREE TIMES A WEEK.    lisdexamfetamine (VYVANSE) 40 MG Cap Take 1 capsule (40 mg total) by mouth every morning.    tadalafiL (CIALIS) 20 MG Tab Take 1 tablet (20 mg total) by mouth daily as needed (ED).     SOCIAL HISTORY  Social History     Tobacco Use    Smoking status: Former     Types: Cigarettes     Quit date: 2010     Years since quittin.4    Smokeless tobacco: Never   Substance Use Topics    Alcohol use: Yes     Alcohol/week: 0.0 standard drinks     Comment: socially    Drug use: No       Living arrangements - the patient lives with his family    ROS:  REVIEW OF SYMPTOMS 23   Do you feel abnormally tired on most days? No--takes Vyvanse, but gives himself breaks    Do you feel you generally sleep well? Yes   Do you have difficulty controlling your bladder?  No   Do you have difficulty controlling your bowels?  No   Do you have frequent muscle cramps, tightness or spasms in your limbs?  No   Do you have new visual symptoms?  No--sees eye doctor    Do you have worsening difficulty with your memory or thinking? No   Do you have worsening symptoms of anxiety or depression?  No   For  patients who walk, Do you have more difficulty walking?  No   Have you fallen since your last visit?  No   For patients who use wheelchairs: Do you have any skin wounds or breakdown? Not Applicable   Do you have difficulty using your hands?  No   Do you have shooting or burning pain? No   Do you have difficulty with sexual function?  Yes--takes Cialis    If you are sexually active, are you using birth control? Y/N  N/A No   Do you often choke when swallowing liquids or solid food?  No   Do you experience worsening symptoms when overheated? No   Do you need any new equipment such as a wheelchair, walker or shower chair? No   Do you receive co-pay financial assistance for your principal MS medicine? Yes   Would you be interested in participating in an MS research trial in the future? Yes   For patients on Gilenya, Tecfidera, Aubagio, Rituxan, Ocrevus, Tysabri, Lemtrada or Methotrexate, are you aware that you should NOT receive live virus vaccines?  Not Applicable   Do you feel you have adequate family/friend support?  Yes   Do you have health insurance?   Yes   Are you currently employed? Yes   Do you receive SSDI/SSI?  Not Applicable   Do you use marijuana or cannabis products? No   Have you been diagnosed with a urinary tract infection since your last visit here? No   Have you been diagnosed with a respiratory tract infection since your last visit here? No   Have you been to the emergency room since your last visit here? No   Have you been hospitalized since your last visit here?  No            Objective:        1. 25 foot timed walk:  Timed 25 Foot Walk: 6/6/2022 12/7/2022   Did patient wear an AFO? No No   Was assistive device used? No No   Time for 25 Foot Walk (seconds) 3.1 2.9   Time for 25 Foot Walk (seconds) 3 2.9       Neurologic Exam     Mental Status   Oriented to person, place, and time.   Attention: normal. Concentration: normal.   Speech: speech is normal   Level of consciousness: alert  Knowledge:  good.   Normal comprehension.     Cranial Nerves     CN III, IV, VI   Extraocular motions are normal.   CN III: no CN III palsy  CN VI: no CN VI palsy  Nystagmus: none     CN V   Right facial sensation deficit: none  Left facial sensation deficit: none    CN VII   Right facial weakness: none  Left facial weakness: none    CN VIII   Hearing: intact    CN IX, X   Palate: symmetric    CN XI   Right sternocleidomastoid strength: normal  Left sternocleidomastoid strength: normal  Right trapezius strength: normal  Left trapezius strength: normal    CN XII   Tongue deviation: none    Motor Exam   Muscle bulk: normal  Overall muscle tone: normal  Right arm tone: normal  Left arm tone: normal  Right leg tone: normal  Left leg tone: normal    Strength   Right neck flexion: 5/5  Left neck flexion: 5/5  Right neck extension: 5/5  Left neck extension: 5/5  Right deltoid: 5/5  Left deltoid: 5/5  Right biceps: 5/5  Left biceps: 5/5  Right triceps: 5/5  Left triceps: 5/5  Right wrist flexion: 5/5  Left wrist flexion: 5/5  Right wrist extension: 5/5  Left wrist extension: 5/5  Right interossei: 5/5  Left interossei: 5/5  Right iliopsoas: 5/5  Left iliopsoas: 5/5  Right quadriceps: 5/5  Left quadriceps: 5/5  Right hamstrin/5  Left hamstrin/5  Right anterior tibial: 5/5  Left anterior tibial: 5/5  Right gastroc: 5/5  Left gastroc: 5/5    Sensory Exam   Right arm vibration: normal  Left arm vibration: normal  Right leg vibration: normal  Left leg vibration: normal    Gait, Coordination, and Reflexes     Gait  Gait: normal    Coordination   Romberg: negative  Finger to nose coordination: normal  Tandem walking coordination: normal    Tremor   Resting tremor: absent    Reflexes   Right brachioradialis: 2+  Left brachioradialis: 2+  Right biceps: 2+  Left biceps: 2+  Right triceps: 2+  Left triceps: 2+  Right patellar: 2+  Left patellar: 2+  Right achilles: 2+  Left achilles: 2+  Right plantar: equivocal  Left plantar:  equivocal  He is able to hop on each foot ten times.   Normal rapid sequential movements in upper extremities.        Imaging:     Results for orders placed during the hospital encounter of 11/28/22    MRI Brain Demyelinating Without Contrast    Impression  Brain appears stable from prior exam, with unchanged single lesion in the posterior right periventricular/deep white matter compatible with the reported history of multiple sclerosis.  No new discrete lesions to indicate ongoing demyelination.      Electronically signed by: Channing Chase  Date:    11/28/2022  Time:    18:02\      Labs:     Lab Results   Component Value Date    [id number] 72 06/06/2022    [id number] 109 (H) 11/03/2021    [id number] 48 09/24/2020     Lab Results   Component Value Date    WBC 5.79 11/03/2021    RBC 5.42 11/03/2021    HGB 15.4 11/03/2021    HCT 46.6 11/03/2021    MCV 86 11/03/2021    MCH 28.4 11/03/2021    MCHC 33.0 11/03/2021    RDW 12.7 11/03/2021     11/03/2021    MPV 10.0 11/03/2021    GRAN 3.6 11/03/2021    GRAN 61.4 11/03/2021    LYMPH 1.7 11/03/2021    LYMPH 29.4 11/03/2021    MONO 0.4 11/03/2021    MONO 7.1 11/03/2021    EOS 0.1 11/03/2021    BASO 0.06 11/03/2021    EOSINOPHIL 0.9 11/03/2021    BASOPHIL 1.0 11/03/2021     Sodium   Date Value Ref Range Status   11/03/2021 143 136 - 145 mmol/L Final     Potassium   Date Value Ref Range Status   11/03/2021 4.6 3.5 - 5.1 mmol/L Final     Chloride   Date Value Ref Range Status   11/03/2021 104 95 - 110 mmol/L Final     CO2   Date Value Ref Range Status   11/03/2021 32 (H) 23 - 29 mmol/L Final     Glucose   Date Value Ref Range Status   11/03/2021 92 70 - 110 mg/dL Final     BUN   Date Value Ref Range Status   11/03/2021 8 6 - 20 mg/dL Final     Creatinine   Date Value Ref Range Status   11/03/2021 1.0 0.5 - 1.4 mg/dL Final     Calcium   Date Value Ref Range Status   11/03/2021 9.9 8.7 - 10.5 mg/dL Final     Total Protein   Date Value Ref Range Status   11/03/2021 6.2  6.0 - 8.4 g/dL Final     Albumin   Date Value Ref Range Status   11/03/2021 4.1 3.5 - 5.2 g/dL Final     Total Bilirubin   Date Value Ref Range Status   11/03/2021 0.9 0.1 - 1.0 mg/dL Final     Comment:     For infants and newborns, interpretation of results should be based  on gestational age, weight and in agreement with clinical  observations.    Premature Infant recommended reference ranges:  Up to 24 hours.............<8.0 mg/dL  Up to 48 hours............<12.0 mg/dL  3-5 days..................<15.0 mg/dL  6-29 days.................<15.0 mg/dL       Alkaline Phosphatase   Date Value Ref Range Status   11/03/2021 95 55 - 135 U/L Final     AST   Date Value Ref Range Status   11/03/2021 11 10 - 40 U/L Final     ALT   Date Value Ref Range Status   11/03/2021 18 10 - 44 U/L Final     Anion Gap   Date Value Ref Range Status   11/03/2021 7 (L) 8 - 16 mmol/L Final     eGFR if    Date Value Ref Range Status   11/03/2021 >60.0 >60 mL/min/1.73 m^2 Final     eGFR if non    Date Value Ref Range Status   11/03/2021 >60.0 >60 mL/min/1.73 m^2 Final     Comment:     Calculation used to obtain the estimated glomerular filtration  rate (eGFR) is the CKD-EPI equation.            MS Impression and Plan:     NEURO MULTIPLE SCLEROSIS IMPRESSION:   MS Status:     Number of relapses in the past year?:  0    Clinical Progression:  Clinically Stable  Plan:     DMT:  No change in management    DMT comment:  Continue glatiramer and Vitamin D.     Symptom Management:  Implement change in symptom management    Implement Change in Symptom Management:  Fatigue (Vyvanse refills sent to pharmacy.)       MRI brain due in November  He will follow up with Dr. Hernandez in 6 months.     Total time spent with patient: 36 minutes   Total time spent on encounter: 40 minutes         RUSSEL Cowan, CNS    Problem List Items Addressed This Visit          Neurologic Problems    Multiple sclerosis - Primary    Relevant  Medications    lisdexamfetamine (VYVANSE) 40 MG Cap    lisdexamfetamine (VYVANSE) 40 MG Cap (Start on 8/12/2023)    lisdexamfetamine (VYVANSE) 40 MG Cap (Start on 9/11/2023)    Other Relevant Orders    MRI Brain Demyelinating Without Contrast       Other    Prophylactic immunotherapy     Other Visit Diagnoses       Fatigue, unspecified type        Relevant Medications    lisdexamfetamine (VYVANSE) 40 MG Cap    lisdexamfetamine (VYVANSE) 40 MG Cap (Start on 8/12/2023)    lisdexamfetamine (VYVANSE) 40 MG Cap (Start on 9/11/2023)    Counseling regarding goals of care

## 2023-07-11 NOTE — TELEPHONE ENCOUNTER
Specialty Pharmacy - Refill Coordination    Specialty Medication Orders Linked to Encounter      Flowsheet Row Most Recent Value   Medication #1 glatiramer (COPAXONE) 40 mg/mL injection (Order#[id number], Rx#[id number])            Refill Questions - Documented Responses      Flowsheet Row Most Recent Value   Patient Availability and HIPAA Verification    Does patient want to proceed with activity? Yes   HIPAA/medical authority confirmed? Yes   Relationship to patient of person spoken to? Self   Refill Screening Questions    Changes to allergies? No   Changes to medications? No   New conditions since last clinic visit? No   Unplanned office visit, urgent care, ED, or hospital admission in the last 4 weeks? No   How does patient/caregiver feel medication is working? Good   Financial problems or insurance changes? No   How many doses of your specialty medications were missed in the last 4 weeks? 0   Would patient like to speak to a pharmacist? No   When does the patient need to receive the medication? 07/21/23   Refill Delivery Questions    How will the patient receive the medication? MEDRx   When does the patient need to receive the medication? 07/21/23   Shipping Address Home   Address in [facility] confirmed and updated if neccessary? Yes   Expected Copay ($) 243.71   Is the patient able to afford the medication copay? Yes   Payment Method CC on file   Days supply of Refill 28   Supplies needed? No supplies needed   Refill activity completed? Yes   Refill activity plan Refill scheduled   Shipment/Pickup Date: 07/11/23            Current Outpatient Medications   Medication Sig    cholecalciferol, vitamin D3, (VITAMIN D3) 50 mcg (2,000 unit) Cap Take 1 capsule by mouth once daily. Take 6000 IU daily.    glatiramer (COPAXONE) 40 mg/mL injection INJECT 40 MG INTO THE SKIN THREE TIMES A WEEK.    lisdexamfetamine (VYVANSE) 40 MG Cap Take 1 capsule (40 mg total) by mouth every morning.    tadalafiL (CIALIS) 20 MG  Tab Take 1 tablet (20 mg total) by mouth daily as needed (ED).   Last reviewed on 12/7/2022 10:22 AM by RUSSEL Moreau, CNS    Review of patient's allergies indicates:   Allergen Reactions    Proamatine [midodrine] Other (See Comments)     Vasectomy    Last reviewed on  12/7/2022 10:22 AM by Katrin Sesay      Tasks added this encounter   No tasks added.   Tasks due within next 3 months   No tasks due.     [facility]  [address] [place] [id number]  Phone: [phone]  Fax: [phone]

## 2023-07-13 ENCOUNTER — OFFICE VISIT (OUTPATIENT)
Dept: NEUROLOGY | Facility: CLINIC | Age: 48
End: 2023-07-13
Payer: COMMERCIAL

## 2023-07-13 VITALS
SYSTOLIC BLOOD PRESSURE: 134 MMHG | HEART RATE: 80 BPM | HEIGHT: 70 IN | DIASTOLIC BLOOD PRESSURE: 88 MMHG | WEIGHT: 190 LBS | BODY MASS INDEX: 27.2 KG/M2

## 2023-07-13 DIAGNOSIS — Z71.89 COUNSELING REGARDING GOALS OF CARE: ICD-10-CM

## 2023-07-13 DIAGNOSIS — Z29.89 PROPHYLACTIC IMMUNOTHERAPY: ICD-10-CM

## 2023-07-13 DIAGNOSIS — R53.83 FATIGUE, UNSPECIFIED TYPE: ICD-10-CM

## 2023-07-13 DIAGNOSIS — G35 MULTIPLE SCLEROSIS: Primary | ICD-10-CM

## 2023-07-13 PROCEDURE — 3075F SYST BP GE 130 - 139MM HG: CPT | Mod: CPTII,S$GLB,, | Performed by: CLINICAL NURSE SPECIALIST

## 2023-07-13 PROCEDURE — 3075F PR MOST RECENT SYSTOLIC BLOOD PRESS GE 130-139MM HG: ICD-10-PCS | Mod: CPTII,S$GLB,, | Performed by: CLINICAL NURSE SPECIALIST

## 2023-07-13 PROCEDURE — 3079F DIAST BP 80-89 MM HG: CPT | Mod: CPTII,S$GLB,, | Performed by: CLINICAL NURSE SPECIALIST

## 2023-07-13 PROCEDURE — 1159F PR MEDICATION LIST DOCUMENTED IN MEDICAL RECORD: ICD-10-PCS | Mod: CPTII,S$GLB,, | Performed by: CLINICAL NURSE SPECIALIST

## 2023-07-13 PROCEDURE — 99215 OFFICE O/P EST HI 40 MIN: CPT | Mod: S$GLB,,, | Performed by: CLINICAL NURSE SPECIALIST

## 2023-07-13 PROCEDURE — 99215 PR OFFICE/OUTPT VISIT, EST, LEVL V, 40-54 MIN: ICD-10-PCS | Mod: S$GLB,,, | Performed by: CLINICAL NURSE SPECIALIST

## 2023-07-13 PROCEDURE — 1159F MED LIST DOCD IN RCRD: CPT | Mod: CPTII,S$GLB,, | Performed by: CLINICAL NURSE SPECIALIST

## 2023-07-13 PROCEDURE — 3008F PR BODY MASS INDEX (BMI) DOCUMENTED: ICD-10-PCS | Mod: CPTII,S$GLB,, | Performed by: CLINICAL NURSE SPECIALIST

## 2023-07-13 PROCEDURE — 99999 PR PBB SHADOW E&M-EST. PATIENT-LVL III: ICD-10-PCS | Mod: PBBFAC,,, | Performed by: CLINICAL NURSE SPECIALIST

## 2023-07-13 PROCEDURE — 3079F PR MOST RECENT DIASTOLIC BLOOD PRESSURE 80-89 MM HG: ICD-10-PCS | Mod: CPTII,S$GLB,, | Performed by: CLINICAL NURSE SPECIALIST

## 2023-07-13 PROCEDURE — 3008F BODY MASS INDEX DOCD: CPT | Mod: CPTII,S$GLB,, | Performed by: CLINICAL NURSE SPECIALIST

## 2023-07-13 PROCEDURE — 99999 PR PBB SHADOW E&M-EST. PATIENT-LVL III: CPT | Mod: PBBFAC,,, | Performed by: CLINICAL NURSE SPECIALIST

## 2023-07-13 RX ORDER — LISDEXAMFETAMINE DIMESYLATE 40 MG/1
40 CAPSULE ORAL EVERY MORNING
Qty: 30 CAPSULE | Refills: 0 | Status: SHIPPED | OUTPATIENT
Start: 2023-07-13 | End: 2023-08-12

## 2023-07-13 RX ORDER — LISDEXAMFETAMINE DIMESYLATE 40 MG/1
40 CAPSULE ORAL EVERY MORNING
Qty: 30 CAPSULE | Refills: 0 | Status: SHIPPED | OUTPATIENT
Start: 2023-08-12 | End: 2023-09-11

## 2023-07-13 RX ORDER — LISDEXAMFETAMINE DIMESYLATE 40 MG/1
40 CAPSULE ORAL EVERY MORNING
Qty: 30 CAPSULE | Refills: 0 | Status: SHIPPED | OUTPATIENT
Start: 2023-09-11 | End: 2023-11-08 | Stop reason: SDUPTHER

## 2023-07-13 NOTE — Clinical Note
ES, please reach out and schedule MRI in November; he needs 6 month recall for Dr. Hernandez, but I also asked him to call in September to schedule for January.

## 2023-07-21 ENCOUNTER — PATIENT MESSAGE (OUTPATIENT)
Dept: PSYCHIATRY | Facility: CLINIC | Age: 48
End: 2023-07-21
Payer: COMMERCIAL

## 2023-08-01 ENCOUNTER — SPECIALTY PHARMACY (OUTPATIENT)
Dept: PHARMACY | Facility: CLINIC | Age: 48
End: 2023-08-01
Payer: COMMERCIAL

## 2023-08-01 NOTE — TELEPHONE ENCOUNTER
Outgoing call. Spoke with pt. He has 5 Copaxone doses left. Could not refill today, too soon. Reschedule call for 8/8

## 2023-08-07 ENCOUNTER — TELEPHONE (OUTPATIENT)
Dept: FAMILY MEDICINE | Facility: CLINIC | Age: 48
End: 2023-08-07
Payer: COMMERCIAL

## 2023-08-14 NOTE — TELEPHONE ENCOUNTER
Specialty Pharmacy - Refill Coordination    Specialty Medication Orders Linked to Encounter      Flowsheet Row Most Recent Value   Medication #1 glatiramer (COPAXONE) 40 mg/mL injection (Order#[id number], Rx#[id number])            Refill Questions - Documented Responses      Flowsheet Row Most Recent Value   Patient Availability and HIPAA Verification    Does patient want to proceed with activity? Yes   HIPAA/medical authority confirmed? Yes   Relationship to patient of person spoken to? Self   Refill Screening Questions    Changes to allergies? No   Changes to medications? No   New conditions since last clinic visit? No   Unplanned office visit, urgent care, ED, or hospital admission in the last 4 weeks? No   How does patient/caregiver feel medication is working? Good   Financial problems or insurance changes? No   How many doses of your specialty medications were missed in the last 4 weeks? 0   Would patient like to speak to a pharmacist? No   When does the patient need to receive the medication? 08/21/23   Refill Delivery Questions    How will the patient receive the medication? MEDRx   When does the patient need to receive the medication? 08/21/23   Shipping Address Home   Address in [facility] confirmed and updated if neccessary? Yes   Expected Copay ($) 243.71   Is the patient able to afford the medication copay? Yes   Payment Method  CC on file   Days supply of Refill 28   Supplies needed? No supplies needed   Refill activity completed? Yes   Refill activity plan Refill scheduled   Shipment/Pickup Date: 08/17/23            Current Outpatient Medications   Medication Sig    cholecalciferol, vitamin D3, (VITAMIN D3) 50 mcg (2,000 unit) Cap Take 1 capsule by mouth once daily. Take 6000 IU daily.    glatiramer (COPAXONE) 40 mg/mL injection INJECT 40 MG INTO THE SKIN THREE TIMES A WEEK.    lisdexamfetamine (VYVANSE) 40 MG Cap Take 1 capsule (40 mg total) by mouth every morning.    [START ON  9/11/2023] lisdexamfetamine (VYVANSE) 40 MG Cap Take 1 capsule (40 mg total) by mouth every morning.    tadalafiL (CIALIS) 20 MG Tab Take 1 tablet (20 mg total) by mouth daily as needed (ED).   Last reviewed on 7/13/2023  1:47 PM by Gonzalo Bauer MA    Review of patient's allergies indicates:   Allergen Reactions    Proamatine [midodrine] Other (See Comments)     Vasectomy    Last reviewed on  7/13/2023 1:47 PM by Gonzalo Bauer      Tasks added this encounter   No tasks added.   Tasks due within next 3 months   8/14/2023 - Refill Coordination Outreach (1 time occurrence)     Nori Lazcano, PharmD  [facility]  [address] [place] [id number]  Phone: [phone]  Fax: [phone]

## 2023-08-23 ENCOUNTER — OFFICE VISIT (OUTPATIENT)
Dept: OPTOMETRY | Facility: CLINIC | Age: 48
End: 2023-08-23
Payer: COMMERCIAL

## 2023-08-23 DIAGNOSIS — H52.13 MYOPIA WITH ASTIGMATISM AND PRESBYOPIA, BILATERAL: Primary | ICD-10-CM

## 2023-08-23 DIAGNOSIS — H52.203 MYOPIA WITH ASTIGMATISM AND PRESBYOPIA, BILATERAL: Primary | ICD-10-CM

## 2023-08-23 DIAGNOSIS — H52.4 MYOPIA WITH ASTIGMATISM AND PRESBYOPIA, BILATERAL: Primary | ICD-10-CM

## 2023-08-23 DIAGNOSIS — G35 MULTIPLE SCLEROSIS: ICD-10-CM

## 2023-08-23 PROCEDURE — 92014 PR EYE EXAM, EST PATIENT,COMPREHESV: ICD-10-PCS | Mod: S$GLB,,, | Performed by: OPTOMETRIST

## 2023-08-23 PROCEDURE — 99999 PR PBB SHADOW E&M-EST. PATIENT-LVL III: CPT | Mod: PBBFAC,,, | Performed by: OPTOMETRIST

## 2023-08-23 PROCEDURE — 92014 COMPRE OPH EXAM EST PT 1/>: CPT | Mod: S$GLB,,, | Performed by: OPTOMETRIST

## 2023-08-23 PROCEDURE — 99999 PR PBB SHADOW E&M-EST. PATIENT-LVL III: ICD-10-PCS | Mod: PBBFAC,,, | Performed by: OPTOMETRIST

## 2023-08-23 NOTE — PROGRESS NOTES
HPI    Pt here for annual exam with no current complaints. Current RX is about 2   years old. Pt denies flashes, but has some floaters.   Last edited by Racquel Malcolm on 8/23/2023  8:54 AM.            Assessment /Plan     For exam results, see Encounter Report.    Myopia with astigmatism and presbyopia, bilateral    Multiple sclerosis      New Spectacle Rx given, discussed different options for glasses. RTC 1 year routine eye exam.   2. No ocular findings, no prev episodes of optic neuritis, Monitor condition. Patient to report any changes. RTC 1 year recheck.

## 2023-08-24 ENCOUNTER — OFFICE VISIT (OUTPATIENT)
Dept: FAMILY MEDICINE | Facility: CLINIC | Age: 48
End: 2023-08-24
Payer: COMMERCIAL

## 2023-08-24 ENCOUNTER — LAB VISIT (OUTPATIENT)
Dept: LAB | Facility: HOSPITAL | Age: 48
End: 2023-08-24
Payer: COMMERCIAL

## 2023-08-24 VITALS
HEART RATE: 72 BPM | HEIGHT: 70 IN | RESPIRATION RATE: 16 BRPM | DIASTOLIC BLOOD PRESSURE: 84 MMHG | BODY MASS INDEX: 27.4 KG/M2 | OXYGEN SATURATION: 98 % | WEIGHT: 191.38 LBS | SYSTOLIC BLOOD PRESSURE: 120 MMHG

## 2023-08-24 DIAGNOSIS — G35 MULTIPLE SCLEROSIS: ICD-10-CM

## 2023-08-24 DIAGNOSIS — Z12.11 SCREENING FOR COLON CANCER: ICD-10-CM

## 2023-08-24 DIAGNOSIS — K90.9 INTESTINAL MALABSORPTION, UNSPECIFIED TYPE: ICD-10-CM

## 2023-08-24 DIAGNOSIS — E55.9 VITAMIN D DEFICIENCY: ICD-10-CM

## 2023-08-24 DIAGNOSIS — Z00.00 WELLNESS EXAMINATION: Primary | ICD-10-CM

## 2023-08-24 DIAGNOSIS — Z00.00 WELLNESS EXAMINATION: ICD-10-CM

## 2023-08-24 LAB
25(OH)D3+25(OH)D2 SERPL-MCNC: 58 NG/ML (ref 30–96)
ALBUMIN SERPL BCP-MCNC: 4.4 G/DL (ref 3.5–5.2)
ALP SERPL-CCNC: 88 U/L (ref 55–135)
ALT SERPL W/O P-5'-P-CCNC: 15 U/L (ref 10–44)
ANION GAP SERPL CALC-SCNC: 11 MMOL/L (ref 8–16)
AST SERPL-CCNC: 14 U/L (ref 10–40)
BILIRUB SERPL-MCNC: 0.9 MG/DL (ref 0.1–1)
BUN SERPL-MCNC: 13 MG/DL (ref 6–20)
CALCIUM SERPL-MCNC: 9.8 MG/DL (ref 8.7–10.5)
CHLORIDE SERPL-SCNC: 106 MMOL/L (ref 95–110)
CHOLEST SERPL-MCNC: 256 MG/DL (ref 120–199)
CHOLEST/HDLC SERPL: 5 {RATIO} (ref 2–5)
CO2 SERPL-SCNC: 25 MMOL/L (ref 23–29)
CREAT SERPL-MCNC: 1.1 MG/DL (ref 0.5–1.4)
ERYTHROCYTE [DISTWIDTH] IN BLOOD BY AUTOMATED COUNT: 12.5 % (ref 11.5–14.5)
EST. GFR  (NO RACE VARIABLE): >60 ML/MIN/1.73 M^2
FOLATE SERPL-MCNC: 5.2 NG/ML (ref 4–24)
GLUCOSE SERPL-MCNC: 88 MG/DL (ref 70–110)
HCT VFR BLD AUTO: 50.3 % (ref 40–54)
HDLC SERPL-MCNC: 51 MG/DL (ref 40–75)
HDLC SERPL: 19.9 % (ref 20–50)
HGB BLD-MCNC: 16.9 G/DL (ref 14–18)
LDLC SERPL CALC-MCNC: 166.4 MG/DL (ref 63–159)
MCH RBC QN AUTO: 28.6 PG (ref 27–31)
MCHC RBC AUTO-ENTMCNC: 33.6 G/DL (ref 32–36)
MCV RBC AUTO: 85 FL (ref 82–98)
NONHDLC SERPL-MCNC: 205 MG/DL
PLATELET # BLD AUTO: 253 K/UL (ref 150–450)
PMV BLD AUTO: 10 FL (ref 9.2–12.9)
POTASSIUM SERPL-SCNC: 4.1 MMOL/L (ref 3.5–5.1)
PROT SERPL-MCNC: 7.2 G/DL (ref 6–8.4)
RBC # BLD AUTO: 5.9 M/UL (ref 4.6–6.2)
SODIUM SERPL-SCNC: 142 MMOL/L (ref 136–145)
TRIGL SERPL-MCNC: 193 MG/DL (ref 30–150)
TSH SERPL DL<=0.005 MIU/L-ACNC: 0.68 UIU/ML (ref 0.4–4)
VIT B12 SERPL-MCNC: 345 PG/ML (ref 210–950)
WBC # BLD AUTO: 8.39 K/UL (ref 3.9–12.7)

## 2023-08-24 PROCEDURE — 80053 COMPREHEN METABOLIC PANEL: CPT | Performed by: INTERNAL MEDICINE

## 2023-08-24 PROCEDURE — 1160F PR REVIEW ALL MEDS BY PRESCRIBER/CLIN PHARMACIST DOCUMENTED: ICD-10-PCS | Mod: CPTII,S$GLB,, | Performed by: INTERNAL MEDICINE

## 2023-08-24 PROCEDURE — 3008F BODY MASS INDEX DOCD: CPT | Mod: CPTII,S$GLB,, | Performed by: INTERNAL MEDICINE

## 2023-08-24 PROCEDURE — 3008F PR BODY MASS INDEX (BMI) DOCUMENTED: ICD-10-PCS | Mod: CPTII,S$GLB,, | Performed by: INTERNAL MEDICINE

## 2023-08-24 PROCEDURE — 1160F RVW MEDS BY RX/DR IN RCRD: CPT | Mod: CPTII,S$GLB,, | Performed by: INTERNAL MEDICINE

## 2023-08-24 PROCEDURE — 3079F PR MOST RECENT DIASTOLIC BLOOD PRESSURE 80-89 MM HG: ICD-10-PCS | Mod: CPTII,S$GLB,, | Performed by: INTERNAL MEDICINE

## 2023-08-24 PROCEDURE — 36415 COLL VENOUS BLD VENIPUNCTURE: CPT | Mod: PN | Performed by: INTERNAL MEDICINE

## 2023-08-24 PROCEDURE — 82306 VITAMIN D 25 HYDROXY: CPT | Performed by: INTERNAL MEDICINE

## 2023-08-24 PROCEDURE — 3079F DIAST BP 80-89 MM HG: CPT | Mod: CPTII,S$GLB,, | Performed by: INTERNAL MEDICINE

## 2023-08-24 PROCEDURE — 99999 PR PBB SHADOW E&M-EST. PATIENT-LVL IV: CPT | Mod: PBBFAC,,, | Performed by: INTERNAL MEDICINE

## 2023-08-24 PROCEDURE — 3074F SYST BP LT 130 MM HG: CPT | Mod: CPTII,S$GLB,, | Performed by: INTERNAL MEDICINE

## 2023-08-24 PROCEDURE — 99386 PR PREVENTIVE VISIT,NEW,40-64: ICD-10-PCS | Mod: S$GLB,,, | Performed by: INTERNAL MEDICINE

## 2023-08-24 PROCEDURE — 1159F MED LIST DOCD IN RCRD: CPT | Mod: CPTII,S$GLB,, | Performed by: INTERNAL MEDICINE

## 2023-08-24 PROCEDURE — 85027 COMPLETE CBC AUTOMATED: CPT | Performed by: INTERNAL MEDICINE

## 2023-08-24 PROCEDURE — 82746 ASSAY OF FOLIC ACID SERUM: CPT | Performed by: INTERNAL MEDICINE

## 2023-08-24 PROCEDURE — 99999 PR PBB SHADOW E&M-EST. PATIENT-LVL IV: ICD-10-PCS | Mod: PBBFAC,,, | Performed by: INTERNAL MEDICINE

## 2023-08-24 PROCEDURE — 80061 LIPID PANEL: CPT | Performed by: INTERNAL MEDICINE

## 2023-08-24 PROCEDURE — 1159F PR MEDICATION LIST DOCUMENTED IN MEDICAL RECORD: ICD-10-PCS | Mod: CPTII,S$GLB,, | Performed by: INTERNAL MEDICINE

## 2023-08-24 PROCEDURE — 99386 PREV VISIT NEW AGE 40-64: CPT | Mod: S$GLB,,, | Performed by: INTERNAL MEDICINE

## 2023-08-24 PROCEDURE — 82607 VITAMIN B-12: CPT | Performed by: INTERNAL MEDICINE

## 2023-08-24 PROCEDURE — 3074F PR MOST RECENT SYSTOLIC BLOOD PRESSURE < 130 MM HG: ICD-10-PCS | Mod: CPTII,S$GLB,, | Performed by: INTERNAL MEDICINE

## 2023-08-24 PROCEDURE — 84443 ASSAY THYROID STIM HORMONE: CPT | Performed by: INTERNAL MEDICINE

## 2023-08-24 NOTE — PROGRESS NOTES
Subjective:       Patient ID: Dory Raza is a 48 y.o. male.    Chief Complaint: Establish Care   HPI    Colonoscopy:  ordered   Immunizations: Flu: Tdap: 2017 Pneumovax: recommend Prevnar 13: recommend  Shingles: recommend  Covid: Y   Smoker:  former     NP just completed FP and Acute -taking tests.  Prn Rx Vyvanse     Wellness     MS: stable dx 2014 Rx Copaxone mgmt neuro Dr Hernandez   Vit D Def: stable Rx otc     Metabolic syndrome:  History of gastric sleeve.  Maintaining weight check vitamins    ____________________________________________________________________________________________________  Assessment & Plan:  1. Wellness examination  - CBC Without Differential; Future  - Comprehensive Metabolic Panel; Future  - Lipid Panel; Future  - Vitamin D; Future  - TSH; Future  - Vitamin B12; Future  - Folate; Future    2. Multiple sclerosis    3. Intestinal malabsorption, unspecified type  - Vitamin B12; Future  - Folate; Future    4. Vitamin D deficiency  - Vitamin D; Future    5. Screening for colon cancer  - Case Request Endoscopy: COLONOSCOPY     Wellness examination  -     CBC Without Differential; Future; Expected date: 08/24/2023  -     Comprehensive Metabolic Panel; Future; Expected date: 08/24/2023  -     Lipid Panel; Future; Expected date: 08/24/2023  -     Vitamin D; Future; Expected date: 08/24/2023  -     TSH; Future; Expected date: 08/24/2023  -     Vitamin B12; Future; Expected date: 08/24/2023  -     Folate; Future; Expected date: 08/24/2023    Multiple sclerosis    Intestinal malabsorption, unspecified type  -     Vitamin B12; Future; Expected date: 08/24/2023  -     Folate; Future; Expected date: 08/24/2023    Vitamin D deficiency  -     Vitamin D; Future; Expected date: 08/24/2023    Screening for colon cancer  -     Case Request Endoscopy: COLONOSCOPY        Continue to work on regular exercise, maintain healthy weight, balanced diet. Avoid unhealthy habits: smoking, excessive alcohol intake.  "    Disclaimer: This note was partly generated using dictation software which may occasionally result in transcription errors  ____________________________________________________________________________________________________  Review of Systems:  Review of Systems   Constitutional:  Negative for chills.   HENT:  Negative for drooling.    Eyes:  Negative for pain.   Respiratory:  Negative for choking.    Cardiovascular:  Negative for chest pain.   Gastrointestinal:  Negative for blood in stool.   Genitourinary:  Negative for hematuria.   Musculoskeletal:  Negative for joint swelling.   Skin:  Negative for pallor.   Neurological:  Negative for facial asymmetry.   Psychiatric/Behavioral:  Negative for confusion.        Objective:     Wt Readings from Last 3 Encounters:   08/24/23 86.8 kg (191 lb 5.8 oz)   07/13/23 86.2 kg (190 lb)   12/07/22 86.4 kg (190 lb 5.9 oz)     BP Readings from Last 3 Encounters:   08/24/23 120/84   07/13/23 134/88   12/07/22 119/73       Lab Results   Component Value Date    WBC 5.79 11/03/2021    HGB 15.4 11/03/2021    HCT 46.6 11/03/2021     11/03/2021     11/03/2021    K 4.6 11/03/2021     11/03/2021    ALT 18 11/03/2021    AST 11 11/03/2021    CO2 32 (H) 11/03/2021    CREATININE 1.0 11/03/2021    BUN 8 11/03/2021    GLU 92 11/03/2021      No results found for: "HGBA1C"   Lab Results   Component Value Date    TSH 0.523 11/03/2021    TSH 1.08 12/14/2010     No results found for: "FREET4"  Lab Results   Component Value Date    LDLCALC 118.8 11/03/2021    LDLCALC 94.2 01/14/2011    LDLCALC 146.2 (H) 12/14/2010     Lab Results   Component Value Date    TRIG 116 11/03/2021    TRIG 134 01/14/2011    TRIG 344 (H) 12/14/2010            Physical Exam  Constitutional:       Appearance: Normal appearance.   HENT:      Head: Normocephalic and atraumatic.   Eyes:      Extraocular Movements: Extraocular movements intact.      Conjunctiva/sclera: Conjunctivae normal.      Pupils: " Pupils are equal, round, and reactive to light.   Cardiovascular:      Rate and Rhythm: Normal rate.   Pulmonary:      Effort: Pulmonary effort is normal.   Neurological:      Mental Status: He is alert and oriented to person, place, and time.   Psychiatric:         Mood and Affect: Mood normal.         Medication List with Changes/Refills   Current Medications    CHOLECALCIFEROL, VITAMIN D3, (VITAMIN D3) 50 MCG (2,000 UNIT) CAP    Take 1 capsule by mouth once daily. Take 6000 IU daily.    GLATIRAMER (COPAXONE) 40 MG/ML INJECTION    INJECT 40 MG INTO THE SKIN THREE TIMES A WEEK.    LISDEXAMFETAMINE (VYVANSE) 40 MG CAP    Take 1 capsule (40 mg total) by mouth every morning.    LISDEXAMFETAMINE (VYVANSE) 40 MG CAP    Take 1 capsule (40 mg total) by mouth every morning.    LISDEXAMFETAMINE (VYVANSE) 40 MG CAP    Take 1 capsule (40 mg total) by mouth every morning.    TADALAFIL (CIALIS) 20 MG TAB    Take 1 tablet (20 mg total) by mouth daily as needed (ED).

## 2023-08-30 ENCOUNTER — PATIENT MESSAGE (OUTPATIENT)
Dept: FAMILY MEDICINE | Facility: CLINIC | Age: 48
End: 2023-08-30
Payer: COMMERCIAL

## 2023-08-30 DIAGNOSIS — J01.00 ACUTE NON-RECURRENT MAXILLARY SINUSITIS: Primary | ICD-10-CM

## 2023-08-30 DIAGNOSIS — E78.2 MIXED HYPERLIPIDEMIA: ICD-10-CM

## 2023-08-31 PROBLEM — E78.2 MIXED HYPERLIPIDEMIA: Status: ACTIVE | Noted: 2023-08-31

## 2023-08-31 RX ORDER — AMOXICILLIN AND CLAVULANATE POTASSIUM 875; 125 MG/1; MG/1
1 TABLET, FILM COATED ORAL EVERY 12 HOURS
Qty: 14 TABLET | Refills: 0 | Status: SHIPPED | OUTPATIENT
Start: 2023-08-31 | End: 2023-12-19

## 2023-09-11 ENCOUNTER — PATIENT MESSAGE (OUTPATIENT)
Dept: ADMINISTRATIVE | Facility: HOSPITAL | Age: 48
End: 2023-09-11
Payer: COMMERCIAL

## 2023-09-14 ENCOUNTER — TELEPHONE (OUTPATIENT)
Dept: NEUROLOGY | Facility: CLINIC | Age: 48
End: 2023-09-14
Payer: COMMERCIAL

## 2023-09-14 NOTE — TELEPHONE ENCOUNTER
----- Message from RUSSEL Moreau, CNS sent at 7/13/2023  3:29 PM CDT -----  ES, please reach out and schedule MRI in November; he needs 6 month recall for Dr. Hernandez, but I also asked him to call in September to schedule for January.

## 2023-09-14 NOTE — TELEPHONE ENCOUNTER
Spoke with pt and scheduled his MRI for 11/2 at 8:30 AM. Pt was already scheduled for his f/u with BB on 1/22 at 9:00 AM and I confirmed this appt with him.

## 2023-09-22 ENCOUNTER — TELEPHONE (OUTPATIENT)
Dept: GASTROENTEROLOGY | Facility: CLINIC | Age: 48
End: 2023-09-22
Payer: COMMERCIAL

## 2023-10-15 ENCOUNTER — PATIENT MESSAGE (OUTPATIENT)
Dept: ADMINISTRATIVE | Facility: OTHER | Age: 48
End: 2023-10-15
Payer: COMMERCIAL

## 2023-10-26 ENCOUNTER — TELEPHONE (OUTPATIENT)
Dept: GASTROENTEROLOGY | Facility: CLINIC | Age: 48
End: 2023-10-26
Payer: COMMERCIAL

## 2023-10-26 NOTE — TELEPHONE ENCOUNTER
Second attempt to schedule C-scope. Pt doesn't answer phone call nor answer [employer] msg. I leave pt a brief vm to call back to schedule C-scope. Cancellation letter is sent to pt's current address. Case is closed.

## 2023-11-02 ENCOUNTER — HOSPITAL ENCOUNTER (OUTPATIENT)
Dept: RADIOLOGY | Facility: HOSPITAL | Age: 48
Discharge: HOME | End: 2023-11-02
Attending: CLINICAL NURSE SPECIALIST
Payer: COMMERCIAL

## 2023-11-02 DIAGNOSIS — G35 MULTIPLE SCLEROSIS: ICD-10-CM

## 2023-11-02 PROCEDURE — 70551 MRI BRAIN DEMYELINATING WITHOUT CONTRAST: ICD-10-PCS | Mod: 26,,, | Performed by: RADIOLOGY

## 2023-11-02 PROCEDURE — 70551 MRI BRAIN STEM W/O DYE: CPT | Mod: TC,PO

## 2023-11-02 PROCEDURE — 70551 MRI BRAIN STEM W/O DYE: CPT | Mod: 26,,, | Performed by: RADIOLOGY

## 2023-11-08 ENCOUNTER — PATIENT MESSAGE (OUTPATIENT)
Dept: ADMINISTRATIVE | Facility: OTHER | Age: 48
End: 2023-11-08
Payer: COMMERCIAL

## 2023-11-08 DIAGNOSIS — G35 MULTIPLE SCLEROSIS: ICD-10-CM

## 2023-11-08 DIAGNOSIS — R53.83 FATIGUE, UNSPECIFIED TYPE: ICD-10-CM

## 2023-11-08 DIAGNOSIS — N52.9 ERECTILE DYSFUNCTION, UNSPECIFIED ERECTILE DYSFUNCTION TYPE: ICD-10-CM

## 2023-11-08 RX ORDER — LISDEXAMFETAMINE DIMESYLATE 40 MG/1
40 CAPSULE ORAL EVERY MORNING
Qty: 30 CAPSULE | Refills: 0 | Status: SHIPPED | OUTPATIENT
Start: 2023-11-08 | End: 2023-12-08

## 2023-11-08 RX ORDER — TADALAFIL 20 MG/1
20 TABLET ORAL DAILY PRN
Qty: 6 TABLET | Status: SHIPPED | OUTPATIENT
Start: 2023-11-08 | End: 2024-01-05 | Stop reason: SDUPTHER

## 2023-12-07 DIAGNOSIS — G35 MULTIPLE SCLEROSIS: ICD-10-CM

## 2023-12-08 ENCOUNTER — HOSPITAL ENCOUNTER (EMERGENCY)
Facility: HOSPITAL | Age: 48
Discharge: HOME | End: 2023-12-08
Attending: EMERGENCY MEDICINE
Payer: COMMERCIAL

## 2023-12-08 VITALS
HEIGHT: 70 IN | RESPIRATION RATE: 16 BRPM | HEART RATE: 87 BPM | SYSTOLIC BLOOD PRESSURE: 146 MMHG | OXYGEN SATURATION: 99 % | WEIGHT: 180 LBS | BODY MASS INDEX: 25.77 KG/M2 | DIASTOLIC BLOOD PRESSURE: 83 MMHG | TEMPERATURE: 98 F

## 2023-12-08 DIAGNOSIS — S86.812A STRAIN OF CALF MUSCLE, LEFT, INITIAL ENCOUNTER: Primary | ICD-10-CM

## 2023-12-08 PROCEDURE — 99284 EMERGENCY DEPT VISIT MOD MDM: CPT

## 2023-12-08 PROCEDURE — 96372 THER/PROPH/DIAG INJ SC/IM: CPT | Performed by: PHYSICIAN ASSISTANT

## 2023-12-08 PROCEDURE — 63600175 PHARM REV CODE 636 W HCPCS: Performed by: PHYSICIAN ASSISTANT

## 2023-12-08 RX ORDER — GLATIRAMER 40 MG/ML
40 INJECTION, SOLUTION SUBCUTANEOUS
Qty: 36 ML | Refills: 1 | Status: ACTIVE | OUTPATIENT
Start: 2023-12-08

## 2023-12-08 RX ORDER — KETOROLAC TROMETHAMINE 30 MG/ML
15 INJECTION, SOLUTION INTRAMUSCULAR; INTRAVENOUS
Status: COMPLETED | OUTPATIENT
Start: 2023-12-08 | End: 2023-12-08

## 2023-12-08 RX ADMIN — KETOROLAC TROMETHAMINE 15 MG: 30 INJECTION, SOLUTION INTRAMUSCULAR; INTRAVENOUS at 07:12

## 2023-12-08 NOTE — Clinical Note
"Dory "Alonzo Raza was seen and treated in our emergency department on 12/8/2023.  He may return to work on 12/13/2023.       If you have any questions or concerns, please don't hesitate to call.      Meron Lazcano PA-C"

## 2023-12-08 NOTE — Clinical Note
"Dory "Alonzo Raza was seen and treated in our emergency department on 12/8/2023.  He may return to work on 12/12/2023.       If you have any questions or concerns, please don't hesitate to call.      Meron Lazcano PA-C"

## 2023-12-09 NOTE — DISCHARGE INSTRUCTIONS
You likely have a high grade calf strain.   Take ibuprofen 600-800 mg every 6 hours as needed with food for anti-inflammatory relief.  You can take acetaminophen/tylenol 650 mg every 6 hours or 1000 mg every 8 hours for added relief.  ELEVATE YOUR LEG AS MUCH AS POSSIBLE. THIS WILL HELP WITH SWELLING.  Apply ice to the area for 10-20 minutes every 4 hours. You can apply heat 2 days after for the same duration and frequency.  Follow up with Ortho/Sports medicine if your symptoms do not improve. You may need a nonemergent advance imaging for better evaluation to evaluate for tendon rupture.   Return to the ER for new or worsening symptoms.  Future Appointments   Date Time Provider Department Center   1/22/2024  9:00 AM Ale Hernandez MD [facility]

## 2023-12-09 NOTE — ED TRIAGE NOTES
"Patient presents to the emergency department today with reports of L leg pain at calf. 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 Patient denies any numbness or tingling to extremity. States feels "coolness" +pulses   "

## 2023-12-09 NOTE — ED PROVIDER NOTES
"Encounter Date: 2023       History     Chief Complaint   Patient presents with    Leg Injury     States felt a "pop" in the left calf while at work---     Patient is a 48-year-old male with a history of multiple sclerosis, ADD who presents to [facility] ED for emergent evaluation of left calf pain.      Patient is unsure if he was helping transferring a patient but recalls acute onset of left calf pain after feeling a "pop" while at work.  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  He endorses 5/10 pain to his left calf worse with palpation of the area and ambulation.  He denies previous history of DVT, hypercoagulability conditions, paresthesias, current tobacco use, hormone use, steroid use, recent surgery, or sedentary lifestyle.  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  He did not take anything for his pain prior to arrival.          Review of patient's allergies indicates:   Allergen Reactions    Proamatine [midodrine] Other (See Comments)     Vasectomy     Past Medical History:   Diagnosis Date    ADD (attention deficit disorder)     DDD (degenerative disc disease), lumbosacral     L5-S1    High cholesterol     MS (multiple sclerosis)      Past Surgical History:   Procedure Laterality Date    ADENOIDECTOMY  1981    SHOULDER SURGERY      bankart repair on left shoulder    SLEEVE GASTROPLASTY      [place]  255 lbs    TONSILLECTOMY      VASECTOMY  2008     Family History   Problem Relation Age of Onset    Thyroid cancer Mother     Cancer Mother         Thyroid Cancer    Cancer Father         . Non-Hodgkin Lymphoma    Hodgkin's lymphoma Father     Diabetes Maternal Grandmother             Hypertension Maternal Grandmother             Stroke Maternal Grandmother             Macular degeneration Maternal Grandfather     Hypertension Maternal Grandfather             Skin cancer Maternal Grandfather     Diabetes " Maternal Grandfather             Blindness Maternal Grandfather         wet AMD    Glaucoma Maternal Grandfather     Cancer Maternal Grandfather         . Skin Cancer    Aneurysm Paternal Grandmother     COPD Paternal Grandmother             Heart disease Paternal Grandmother             Amblyopia Neg Hx     Cataracts Neg Hx     Retinal detachment Neg Hx     Strabismus Neg Hx      Social History     Tobacco Use    Smoking status: Former     Current packs/day: 0.00     Average packs/day: 1 pack/day for 10.0 years (10.0 ttl pk-yrs)     Types: Cigarettes     Quit date: 2010     Years since quittin.8    Smokeless tobacco: Never   Substance Use Topics    Alcohol use: Yes     Comment: socially    Drug use: No     Review of Systems   Constitutional:  Negative for fever.   HENT:  Negative for sore throat.    Respiratory:  Negative for shortness of breath.    Cardiovascular:  Negative for chest pain.   Gastrointestinal:  Negative for nausea.   Genitourinary:  Negative for dysuria.   Musculoskeletal:  Positive for myalgias. Negative for back pain.   Skin:  Negative for rash.   Neurological:  Negative for weakness.   Hematological:  Does not bruise/bleed easily.       Physical Exam     Initial Vitals [23 1822]   BP Pulse Resp Temp SpO2   (!) 146/83 87 16 98.2 °F (36.8 °C) 99 %      MAP       --         Physical Exam    Vitals reviewed.  Constitutional: He appears well-developed and well-nourished. He is not diaphoretic. He is cooperative.  Non-toxic appearance. He does not have a sickly appearance. He does not appear ill. No distress.   HENT:   Head: Normocephalic and atraumatic.   Nose: Nose normal.   Mouth/Throat: No trismus in the jaw.   Eyes: Conjunctivae and EOM are normal.   Neck:   Normal range of motion.  Cardiovascular:  Normal rate.           Pulses:       Dorsalis pedis pulses are 2+ on the left side.   Pulmonary/Chest: No accessory muscle usage. No tachypnea. No  respiratory distress.   Abdominal: He exhibits no distension.   Musculoskeletal:         General: Normal range of motion.      Cervical back: Normal range of motion.      Right hip: Normal range of motion.      Left hip: Normal range of motion.      Right knee: Normal range of motion.      Left knee: Normal range of motion.      Left lower leg: Swelling and tenderness (most of medial belly of gastrocnemius) present.      Right ankle: Normal range of motion.      Right Achilles Tendon: Ann's test negative.      Left ankle: Normal range of motion.      Left Achilles Tendon: Ann's test negative.     Neurological: He is alert. He has normal strength.   Skin: Skin is dry. No pallor.         ED Course   Procedures  Labs Reviewed - No data to display       Imaging Results    None          Medications   ketorolac injection 15 mg (15 mg Intramuscular Given 12/8/23 1902)     Medical Decision Making  Patient is a 48-year-old male with a history of multiple sclerosis, ADD who presents to [facility] ED for emergent evaluation of left calf pain.      Differential diagnosis includes but is not limited to strain, sprain, tendinopathy, rupture.  He has a negative Ann test.  His pain is not in his Achilles tendon.  He has intact dorsiflexion and plantar flexion.  I do not think he has a complete tendon rupture. No risk factors for DVT.  He had acute pain with movement while at work.   I think his symptoms are likely from high-grade gastrocnemius muscle strain versus tendonopathy.     He is still able to bear weight and take steps.  We will treat him conservatively.  There is no indication for emergent MRI at this time.  An x-ray would be of little utility.  He declined crutches and thinks he has some at home.  Will treat him conservatively.  Will give him Toradol intramuscular injection now.  Continue NSAIDs and acetaminophen at home.  Ice for 2 days then heat.  Elevate.  If symptoms do not improve, follow up closely with  sports medicine and orthopedics.  Referral placed.  Work excuse provided.  All of his questions were answered.  Patient comfortable with plan and stable for discharge.    Risk  Prescription drug management.               ED Course as of 12/08/23 2334   Fri Dec 08, 2023   1840 BP(!): 146/83 [CL]   1840 Temp: 98.2 °F (36.8 °C) [CL]   1840 Pulse: 87 [CL]   1840 Resp: 16 [CL]   1840 SpO2: 99 % [CL]      ED Course User Index  [CL] Meron Lazcano PA-C                           Clinical Impression:  Final diagnoses:  [S86.812A] Strain of calf muscle, left, initial encounter (Primary)          ED Disposition Condition    Discharge Stable          ED Prescriptions    None       Follow-up Information       Follow up With Specialties Details Why Contact Info Additional Information    [facility] SPORTS MEDICINE Sports Medicine Call   [address]  [facility] [address]  [phone]     [place] - Orthopedics Orthopedics Call   [address] [facility] [address] [place] [id number] [phone] 2nd Floor    [facility] - Emergency Dept Emergency Medicine  If symptoms worsen [address] [facility] [id number] [phone]           Future Appointments   Date Time Provider Department Center   1/22/2024  9:00 AM Ael Hernandez MD [facility] Meron Payne PA-C  12/08/23 7746

## 2023-12-11 ENCOUNTER — OFFICE VISIT (OUTPATIENT)
Dept: URGENT CARE | Facility: CLINIC | Age: 48
End: 2023-12-11
Payer: COMMERCIAL

## 2023-12-11 VITALS
TEMPERATURE: 99 F | HEART RATE: 78 BPM | RESPIRATION RATE: 16 BRPM | OXYGEN SATURATION: 98 % | DIASTOLIC BLOOD PRESSURE: 79 MMHG | BODY MASS INDEX: 25.77 KG/M2 | WEIGHT: 180 LBS | SYSTOLIC BLOOD PRESSURE: 125 MMHG | HEIGHT: 70 IN

## 2023-12-11 DIAGNOSIS — Z02.6 ENCOUNTER RELATED TO WORKER'S COMPENSATION CLAIM: Primary | ICD-10-CM

## 2023-12-11 DIAGNOSIS — S86.112A RUPTURE OF LEFT GASTROCNEMIUS TENDON, INITIAL ENCOUNTER: ICD-10-CM

## 2023-12-11 DIAGNOSIS — S89.90XA INJURY OF CALF: ICD-10-CM

## 2023-12-11 LAB
CTP QC/QA: YES
POC 10 PANEL DRUG SCREEN: NEGATIVE

## 2023-12-11 PROCEDURE — 99203 PR OFFICE/OUTPT VISIT, NEW, LEVL III, 30-44 MIN: ICD-10-PCS | Mod: S$GLB,,, | Performed by: FAMILY MEDICINE

## 2023-12-11 PROCEDURE — 80305 DRUG TEST PRSMV DIR OPT OBS: CPT | Mod: S$GLB,,, | Performed by: FAMILY MEDICINE

## 2023-12-11 PROCEDURE — 80305 POCT RAPID DRUG SCREEN 10 PANEL: ICD-10-PCS | Mod: S$GLB,,, | Performed by: FAMILY MEDICINE

## 2023-12-11 PROCEDURE — 99203 OFFICE O/P NEW LOW 30 MIN: CPT | Mod: S$GLB,,, | Performed by: FAMILY MEDICINE

## 2023-12-11 NOTE — PROGRESS NOTES
"Subjective:      Patient ID: Dory Raza is a 48 y.o. male.    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 4    If this is a return visit, do you feel that you have improved since your initial injury? No real improvement, patient states it still hurts to walk on it, does not feel he can treat patients     ER note from 12/08/2023-Patient is a 48-year-old male with a history of multiple sclerosis, ADD who presents to [facility] ED for emergent evaluation of left calf pain.       Patient is unsure if he was helping transferring a patient but recalls acute onset of left calf pain after feeling a "pop" while at work.  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  He endorses 5/10 pain to his left calf worse with palpation of the area and ambulation.  He denies previous history of DVT, hypercoagulability conditions, paresthesias, current tobacco use, hormone use, steroid use, recent surgery, or sedentary lifestyle.  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  He did not take anything for his pain prior to arrival.              Leg Pain   The incident occurred 3 to 5 days ago. The incident occurred at work. The injury mechanism is unknown. The pain is present in the left leg. The pain is at a severity of 4/10. The pain is mild. Pertinent negatives include no inability to bear weight, loss of motion, loss of sensation, muscle weakness, numbness or tingling. He has tried acetaminophen for the symptoms. The treatment provided mild relief.       Neurological:  Negative for numbness.     Objective:     Physical Exam   Swelling left calf compared to the right  TTP  Neg thompsons  Antalgic " gait      Assessment:      1. Encounter related to worker's compensation claim    2. Injury of calf    3. 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

## 2023-12-11 NOTE — LETTER
[facility] - [place] [address], [address]  [facility] [address]  Phone: [phone]  Fax: [phone]  [employer]: [email or web address]    Pt Name: Dory Raza  Injury Date: 12/08/2023   Employee ID: [id number] Date of First Treatment: 12/11/2023   Company: [employer]      Appointment Time: 12:45 PM Arrived: 1245   Provider: Zaki Raines MD Time Out:300     Office Treatment:   1. Encounter related to worker's compensation claim    2. Injury of calf    3. 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    Some OTC measures to help in recovery(if no allergies to, renal issues or pregnant):  Tylenol 325mg 3x per day  Ibuprofen 400mg 3x per day OR Aleve regular strength one tablet 2x per day  Take Pepcid 20mg BID  If applicable or discussed: Magnesium OTC daily; Topical Voltaren Gel; Lidocaine patches  Massage area if possible  Resting of the injured area  Ice for ankle, wrist or elbow injury  Elevation of the injured area if applicable  Heating pad for muscle injury  Stretching/ROM exercises as described in clinic.   ** BE ADVISED: You should be in regular contact with your W/C  to know the status of your claim and /or (if any)pending referrals**

## 2023-12-14 ENCOUNTER — PATIENT MESSAGE (OUTPATIENT)
Dept: URGENT CARE | Facility: CLINIC | Age: 48
End: 2023-12-14
Payer: COMMERCIAL

## 2023-12-15 ENCOUNTER — TELEPHONE (OUTPATIENT)
Dept: URGENT CARE | Facility: CLINIC | Age: 48
End: 2023-12-15
Payer: COMMERCIAL

## 2023-12-15 DIAGNOSIS — S86.112A RUPTURE OF LEFT GASTROCNEMIUS TENDON, INITIAL ENCOUNTER: Primary | ICD-10-CM

## 2023-12-15 PROBLEM — M25.672 DECREASED RANGE OF MOTION OF LEFT ANKLE: Status: ACTIVE | Noted: 2023-12-15

## 2023-12-15 PROBLEM — M79.662 PAIN OF LEFT CALF: Status: ACTIVE | Noted: 2023-12-15

## 2023-12-15 PROBLEM — R29.898 WEAKNESS OF LEFT LOWER EXTREMITY: Status: ACTIVE | Noted: 2023-12-15

## 2023-12-15 NOTE — TELEPHONE ENCOUNTER
US shows significant calf msk tear. Given medical hx, will refer to ortho in case earlier intervention is necessary in addition to PT.

## 2023-12-18 DIAGNOSIS — M79.605 PAIN OF LEFT LOWER EXTREMITY: Primary | ICD-10-CM

## 2023-12-19 ENCOUNTER — OFFICE VISIT (OUTPATIENT)
Dept: ORTHOPEDICS | Facility: CLINIC | Age: 48
End: 2023-12-19
Payer: COMMERCIAL

## 2023-12-19 ENCOUNTER — HOSPITAL ENCOUNTER (OUTPATIENT)
Dept: RADIOLOGY | Facility: HOSPITAL | Age: 48
Discharge: HOME | End: 2023-12-19
Attending: ORTHOPAEDIC SURGERY
Payer: COMMERCIAL

## 2023-12-19 DIAGNOSIS — S89.92XA LEFT LEG INJURY, INITIAL ENCOUNTER: Primary | ICD-10-CM

## 2023-12-19 DIAGNOSIS — S86.112A GASTROCNEMIUS STRAIN, LEFT, INITIAL ENCOUNTER: ICD-10-CM

## 2023-12-19 DIAGNOSIS — M79.605 PAIN OF LEFT LOWER EXTREMITY: ICD-10-CM

## 2023-12-19 PROCEDURE — 73552 X-RAY EXAM OF FEMUR 2/>: CPT | Mod: 26,LT,, | Performed by: RADIOLOGY

## 2023-12-19 PROCEDURE — 99204 PR OFFICE/OUTPT VISIT, NEW, LEVL IV, 45-59 MIN: ICD-10-PCS | Mod: S$GLB,,, | Performed by: ORTHOPAEDIC SURGERY

## 2023-12-19 PROCEDURE — 73590 XR TIBIA FIBULA 2 VIEW LEFT: ICD-10-PCS | Mod: 26,LT,, | Performed by: RADIOLOGY

## 2023-12-19 PROCEDURE — 73552 X-RAY EXAM OF FEMUR 2/>: CPT | Mod: TC,PO,LT

## 2023-12-19 PROCEDURE — 97760 ORTHOTIC MGMT&TRAING 1ST ENC: CPT | Mod: GP,S$GLB,, | Performed by: ORTHOPAEDIC SURGERY

## 2023-12-19 PROCEDURE — 73590 X-RAY EXAM OF LOWER LEG: CPT | Mod: TC,PO,LT

## 2023-12-19 PROCEDURE — 99204 OFFICE O/P NEW MOD 45 MIN: CPT | Mod: S$GLB,,, | Performed by: ORTHOPAEDIC SURGERY

## 2023-12-19 PROCEDURE — 99999 PR PBB SHADOW E&M-EST. PATIENT-LVL II: ICD-10-PCS | Mod: PBBFAC,,, | Performed by: ORTHOPAEDIC SURGERY

## 2023-12-19 PROCEDURE — 97760 PR ORTHOTIC MGMT&TRAINJ INITIAL ENC EA 15 MINS: ICD-10-PCS | Mod: GP,S$GLB,, | Performed by: ORTHOPAEDIC SURGERY

## 2023-12-19 PROCEDURE — 73590 X-RAY EXAM OF LOWER LEG: CPT | Mod: 26,LT,, | Performed by: RADIOLOGY

## 2023-12-19 PROCEDURE — 99999 PR PBB SHADOW E&M-EST. PATIENT-LVL II: CPT | Mod: PBBFAC,,, | Performed by: ORTHOPAEDIC SURGERY

## 2023-12-19 PROCEDURE — 73552 XR FEMUR 2 VIEW LEFT: ICD-10-PCS | Mod: 26,LT,, | Performed by: RADIOLOGY

## 2023-12-19 NOTE — LETTER
December 19, 2023      [place] - Orthopedics  [address]  [place] [address]  Phone: [phone]       Patient: Dory Raza   YOB: 1975  Date of Visit: 12/19/2023    To Whom It May Concern:    Mariano Raza  was at [employer] [facility] on 12/19/2023. The patient will remain on light duty until his follow up appointment with orthopedics on 01/02/2023.   If you have any questions or concerns, or if I can be of further assistance, please do not hesitate to contact me.    Sincerely,    Neeraj Willingham MD

## 2023-12-27 NOTE — PROGRESS NOTES
Status/Diagnosis: Left medial gastroc strain.  Date of Surgery: none  Date of Injury: 12/08/2023  Return visit: 01/02/2024  X-rays on Return: none    Chief Complaint:   Chief Complaint   Patient presents with    Left Lower Leg - Pain, Injury     Present History:  WORK COMP CASE  Patient presents today via referral from [facility]   Dory Raza is a 48 y.o. male with acute onset left calf pain.  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  Injury occurred on 12/08/2023.  Initially seen in the emergency department at which time x-rays were taken read as negative.  Currently working with physical therapy.  Presents today to my clinic for evaluation and treatment.  Denies any calf pain prior to the above-noted injury.  Pain currently 5/10.  Denies any numbness or tingling.  Past medical history significant for multiple sclerosis.  Denies tobacco use.       Past Medical History:   Diagnosis Date    ADD (attention deficit disorder) 2014    DDD (degenerative disc disease), lumbosacral     L5-S1    High cholesterol     MS (multiple sclerosis) 2014       Past Surgical History:   Procedure Laterality Date    ADENOIDECTOMY  1981    SHOULDER SURGERY  2001    bankart repair on left shoulder    SLEEVE GASTROPLASTY  2021    [place]  255 lbs    TONSILLECTOMY      VASECTOMY  2008       Current Outpatient Medications   Medication Sig    cholecalciferol, vitamin D3, (VITAMIN D3) 50 mcg (2,000 unit) Cap Take 1 capsule by mouth once daily. Take 6000 IU daily.    glatiramer (COPAXONE) 40 mg/mL injection INJECT 40 MG INTO THE SKIN THREE TIMES A WEEK.    lisdexamfetamine (VYVANSE) 40 MG Cap Take 1 capsule (40 mg total) by mouth every morning.    tadalafiL (CIALIS) 20 MG Tab Take 1 tablet (20 mg total) by mouth daily as needed (ED).     No current facility-administered medications for this visit.       Review of patient's allergies indicates:   Allergen Reactions    Proamatine [midodrine] Other  (See Comments)     Vasectomy       Family History   Problem Relation Age of Onset    Thyroid cancer Mother     Cancer Mother         Thyroid Cancer    Cancer Father         . Non-Hodgkin Lymphoma    Hodgkin's lymphoma Father     Diabetes Maternal Grandmother             Hypertension Maternal Grandmother             Stroke Maternal Grandmother             Macular degeneration Maternal Grandfather     Hypertension Maternal Grandfather             Skin cancer Maternal Grandfather     Diabetes Maternal Grandfather             Blindness Maternal Grandfather         wet AMD    Glaucoma Maternal Grandfather     Cancer Maternal Grandfather         . 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   Tobacco Use    Smoking status: Former     Current packs/day: 0.00     Average packs/day: 1 pack/day for 10.0 years (10.0 ttl pk-yrs)     Types: Cigarettes     Quit date: 2010     Years since quittin.8    Smokeless tobacco: Never   Substance and Sexual Activity    Alcohol use: Yes     Comment: socially    Drug use: No    Sexual activity: Yes     Partners: Female     Birth control/protection: See Surgical Hx     Social Determinants of Health     Financial Resource Strain: Medium Risk (12/15/2023)    Overall Financial Resource Strain (CARDIA)     Difficulty of Paying Living Expenses: Somewhat hard   Food Insecurity: No Food Insecurity (12/15/2023)    Hunger Vital Sign     Worried About Running Out of Food in the Last Year: Never true     Ran Out of Food in the Last Year: Never true   Transportation  Needs: No Transportation Needs (12/15/2023)    PRAPARE - Transportation     Lack of Transportation (Medical): No     Lack of Transportation (Non-Medical): No   Physical Activity: Sufficiently Active (12/15/2023)    Exercise Vital Sign     Days of Exercise per Week: 4 days     Minutes of Exercise per Session: 40 min   Stress: No Stress Concern Present (12/15/2023)    [employer] - Occupational Stress Questionnaire     Feeling of Stress : Only a little   Social Connections: Unknown (12/15/2023)    Social Connection and Isolation Panel [NHANES]     Frequency of Communication with Friends and Family: More than three times a week     Frequency of Social Gatherings with Friends and Family: Twice a week     Active Member of Clubs or Organizations: Yes     Attends Club or Organization Meetings: Never     Marital Status:    Housing Stability: High Risk (12/15/2023)    Housing Stability Vital Sign     Unable to Pay for Housing in the Last Year: Yes     Number of Places Lived in the Last Year: 1     Unstable Housing in the Last Year: No       Physical exam:  There were no vitals filed for this visit.  There is no height or weight on file to calculate BMI.  General: In no apparent distress; well developed and well nourished.  HEENT: normocephalic; atraumatic.  Cardiovascular: regular rate.  Respiratory: no increased work of breathing.  Musculoskeletal:   Gait: mild antalgic  Inspection:   MODERATE RESIDUAL SWELLING INVOLVING THE LEF CALF MUSCULATURE.  PAIN LOCALIZED TO THE DISTAL MARGIN OF THE MEDIAL GASTROCS MUSCLE BELLY WITH MODERATE TENDERNESS ON DEEP PALPATION.  NO PALPABLE DEFECT ALONG THE COURSE OF THE ACHILLES.  SOME NEAR SYMMETRIC RESTING ANKLE PLANTAR FLEXION WITH THE PATIENT PRONE AND KNEES FLEXED 90°.  GOOD TENSION WITH CARPENTER TESTING.  Silfverskiold: Negative  Alignment:  Knee: neutral               Ankle: neutral              Hindfoot: neutral              Forefoot: neutral    Strength:              Dorsiflexion 5/5  Plantar flexion 5/5 with pain  Inversion 5/5  Eversion 5/5  Sensation:              SILT distally  ROM:              Ankle: near full with pain at extremes              Subtalar: full and painless  Pulses: Palpable pedal pulse                   Imaging Studies/Outside documentation:  I have ordered/reviewed/interpreted the following images/outside documentation:  1. Weight-bearing 3-views of Left tibia/fibula:   On my independent review, no acute bony abnormality noted. Joint spaces well maintained.      Assessment:  Dory Raza is a 48 y.o. male with Left medial gastroc strain.     Plan:   Clinical and radiographic findings were discussed.  Recommend conservative management to include patient be fit for a tall boot today.  He had been given a short boot in the ED however this is not appropriate given his diagnoses.  Patient may bear full weight as pain allows.  Single heel wedge as needed to help offload the area of interest.  Patient to hold off on physical therapy at this time.  Discussed rest, ice, compression, elevation, over-the-counter oral NSAID use as needed for pain.    We will plan to see the patient back on 1/2 for repeat clinical evaluation.  No new x-ray.  Possible re-initiation of physical therapy at that time.    Patient to remain on light duty until next clinic visit.  Patient voiced understanding.  All questions were answered.    We performed a custom orthotic/brace fitting, adjusting and training with the patient. The patient demonstrated understanding and proper care. This was performed for 15 minutes.    This note was created using voice recognition software and may contain grammatical errors.

## 2024-01-02 ENCOUNTER — OFFICE VISIT (OUTPATIENT)
Dept: ORTHOPEDICS | Facility: CLINIC | Age: 49
End: 2024-01-02
Payer: COMMERCIAL

## 2024-01-02 DIAGNOSIS — S86.112A GASTROCNEMIUS STRAIN, LEFT, INITIAL ENCOUNTER: Primary | ICD-10-CM

## 2024-01-02 PROCEDURE — 99213 OFFICE O/P EST LOW 20 MIN: CPT | Mod: S$GLB,,, | Performed by: ORTHOPAEDIC SURGERY

## 2024-01-02 PROCEDURE — 99999 PR PBB SHADOW E&M-EST. PATIENT-LVL I: CPT | Mod: PBBFAC,,, | Performed by: ORTHOPAEDIC SURGERY

## 2024-01-02 NOTE — PROGRESS NOTES
Status/Diagnosis: Left medial gastroc strain.  Date of Surgery: none  Date of Injury: 12/08/2023  Return visit: 1 month  X-rays on Return: none    Chief Complaint:   Chief Complaint   Patient presents with    Left Lower Leg - Injury, Pain     Present History:  WORK COMP CASE  Patient presents today via referral from No ref. provider found   Dory Raza is a 48 y.o. male with acute onset left calf pain.  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  Injury occurred on 12/08/2023.  Initially seen in the emergency department at which time x-rays were taken read as negative.  Currently working with physical therapy.  Presents today to my clinic for evaluation and treatment.  Denies any calf pain prior to the above-noted injury.  Pain currently 5/10.  Denies any numbness or tingling.  Past medical history significant for multiple sclerosis.  Denies tobacco use.     01/02/2024:  Patient returns today for repeat clinical evaluation.  Overall doing significantly better in regard to pain.  0/10.  He was wearing the tall boot with a small heel wedge in place.  Has been performing home exercises as previously discussed.  Does not require any pain medication.  Currently on light duty for work.      Past Medical History:   Diagnosis Date    ADD (attention deficit disorder) 2014    DDD (degenerative disc disease), lumbosacral     L5-S1    High cholesterol     MS (multiple sclerosis) 2014       Past Surgical History:   Procedure Laterality Date    ADENOIDECTOMY  1981    SHOULDER SURGERY  2001    bankart repair on left shoulder    SLEEVE GASTROPLASTY  2021    [place]  255 lbs    TONSILLECTOMY      VASECTOMY  2008       Current Outpatient Medications   Medication Sig    cholecalciferol, vitamin D3, (VITAMIN D3) 50 mcg (2,000 unit) Cap Take 1 capsule by mouth once daily. Take 6000 IU daily.    glatiramer (COPAXONE) 40 mg/mL injection INJECT 40 MG INTO THE SKIN THREE TIMES A WEEK.     lisdexamfetamine (VYVANSE) 40 MG Cap Take 1 capsule (40 mg total) by mouth every morning.    tadalafiL (CIALIS) 20 MG Tab Take 1 tablet (20 mg total) by mouth daily as needed (ED).     No current facility-administered medications for this visit.       Review of patient's allergies indicates:   Allergen Reactions    Proamatine [midodrine] Other (See Comments)     Vasectomy       Family History   Problem Relation Age of Onset    Thyroid cancer Mother     Cancer Mother         Thyroid Cancer    Cancer Father         . Non-Hodgkin Lymphoma    Hodgkin's lymphoma Father     Diabetes Maternal Grandmother             Hypertension Maternal Grandmother             Stroke Maternal Grandmother             Macular degeneration Maternal Grandfather     Hypertension Maternal Grandfather             Skin cancer Maternal Grandfather     Diabetes Maternal Grandfather             Blindness Maternal Grandfather         wet AMD    Glaucoma Maternal Grandfather     Cancer Maternal Grandfather         . 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   Tobacco Use    Smoking status: Former     Current packs/day: 0.00     Average packs/day: 1 pack/day for 10.0 years (10.0 ttl pk-yrs)     Types: Cigarettes     Quit date: 2010     Years since quittin.8    Smokeless tobacco: Never   Substance and Sexual Activity    Alcohol use: Yes     Comment: socially    Drug use: No    Sexual activity: Yes     Partners: Female     Birth control/protection: See Surgical Hx     Social Determinants of Health     Financial  Resource Strain: Medium Risk (12/15/2023)    Overall Financial Resource Strain (CARDIA)     Difficulty of Paying Living Expenses: Somewhat hard   Food Insecurity: No Food Insecurity (12/15/2023)    Hunger Vital Sign     Worried About Running Out of Food in the Last Year: Never true     Ran Out of Food in the Last Year: Never true   Transportation Needs: No Transportation Needs (12/15/2023)    PRAPARE - Transportation     Lack of Transportation (Medical): No     Lack of Transportation (Non-Medical): No   Physical Activity: Sufficiently Active (12/15/2023)    Exercise Vital Sign     Days of Exercise per Week: 4 days     Minutes of Exercise per Session: 40 min   Stress: No Stress Concern Present (12/15/2023)    [employer] [place] [employer] - Occupational Stress Questionnaire     Feeling of Stress : Only a little   Social Connections: Unknown (12/15/2023)    Social Connection and Isolation Panel [NHANES]     Frequency of Communication with Friends and Family: More than three times a week     Frequency of Social Gatherings with Friends and Family: Twice a week     Active Member of Clubs or Organizations: Yes     Attends Club or Organization Meetings: Never     Marital Status:    Housing Stability: High Risk (12/15/2023)    Housing Stability Vital Sign     Unable to Pay for Housing in the Last Year: Yes     Number of Places Lived in the Last Year: 1     Unstable Housing in the Last Year: No       Physical exam:  There were no vitals filed for this visit.  There is no height or weight on file to calculate BMI.  General: In no apparent distress; well developed and well nourished.  HEENT: normocephalic; atraumatic.  Cardiovascular: regular rate.  Respiratory: no increased work of breathing.  Musculoskeletal:   Gait: nonantalgic  Inspection:   Mild residual swelling involving the left calf musculature.  Previously localized pain to the distal margin of the medial gastroc is essentially absent on deep  palpation today.  No palpable defect.  Good tension with Ann testing.  Symmetric ankle plantar flexion with the patient prone and knees flexed 90°.  Silfverskiold: Negative  Alignment:  Knee: neutral               Ankle: neutral              Hindfoot: neutral              Forefoot: neutral   Strength:              Dorsiflexion 5/5  Plantar flexion 5/5 without pain  Inversion 5/5  Eversion 5/5  Sensation:              SILT distally  ROM:              Ankle and subtalar:  Full with little to no pain  Pulses: Palpable pedal pulse                   Imaging Studies/Outside documentation:  I have ordered/reviewed/interpreted the following images/outside documentation:  No new imaging today.     Assessment:  Dory Raza is a 48 y.o. male with Left medial gastroc strain.     Plan:   Patient has had significant clinical improvement since last being seen.    We will plan to restart physical therapy going forward.  Patient may transition out of the boot into normal shoe wear.  Patient informed to let pain be his guide.    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    Tentative plans for return to work as a flight nurse starting the week of 01/15/2024.  Patient voiced understanding.  All questions were answered.  Return to clinic in 1 month for repeat evaluation, or sooner if needed.    This note was created using voice recognition software and may contain grammatical errors.

## 2024-01-05 ENCOUNTER — PATIENT MESSAGE (OUTPATIENT)
Dept: ADMINISTRATIVE | Facility: OTHER | Age: 49
End: 2024-01-05
Payer: COMMERCIAL

## 2024-01-05 DIAGNOSIS — N52.9 ERECTILE DYSFUNCTION, UNSPECIFIED ERECTILE DYSFUNCTION TYPE: ICD-10-CM

## 2024-01-08 RX ORDER — TADALAFIL 20 MG/1
20 TABLET ORAL DAILY PRN
Qty: 6 TABLET | Status: SHIPPED | OUTPATIENT
Start: 2024-01-08 | End: 2025-01-07

## 2024-01-22 ENCOUNTER — PATIENT MESSAGE (OUTPATIENT)
Dept: PSYCHIATRY | Facility: CLINIC | Age: 49
End: 2024-01-22
Payer: COMMERCIAL

## 2024-01-22 ENCOUNTER — OFFICE VISIT (OUTPATIENT)
Dept: NEUROLOGY | Facility: CLINIC | Age: 49
End: 2024-01-22
Payer: COMMERCIAL

## 2024-01-22 VITALS
SYSTOLIC BLOOD PRESSURE: 132 MMHG | HEART RATE: 73 BPM | HEIGHT: 70 IN | WEIGHT: 188.5 LBS | BODY MASS INDEX: 26.99 KG/M2 | DIASTOLIC BLOOD PRESSURE: 80 MMHG

## 2024-01-22 DIAGNOSIS — R90.89 ABNORMAL FINDING ON MRI OF BRAIN: ICD-10-CM

## 2024-01-22 DIAGNOSIS — Z71.89 COUNSELING REGARDING GOALS OF CARE: ICD-10-CM

## 2024-01-22 DIAGNOSIS — G35 MULTIPLE SCLEROSIS: ICD-10-CM

## 2024-01-22 DIAGNOSIS — Z29.89 PROPHYLACTIC IMMUNOTHERAPY: Primary | ICD-10-CM

## 2024-01-22 PROCEDURE — 1159F MED LIST DOCD IN RCRD: CPT | Mod: CPTII,S$GLB,, | Performed by: PSYCHIATRY & NEUROLOGY

## 2024-01-22 PROCEDURE — 3075F SYST BP GE 130 - 139MM HG: CPT | Mod: CPTII,S$GLB,, | Performed by: PSYCHIATRY & NEUROLOGY

## 2024-01-22 PROCEDURE — 3079F DIAST BP 80-89 MM HG: CPT | Mod: CPTII,S$GLB,, | Performed by: PSYCHIATRY & NEUROLOGY

## 2024-01-22 PROCEDURE — 3008F BODY MASS INDEX DOCD: CPT | Mod: CPTII,S$GLB,, | Performed by: PSYCHIATRY & NEUROLOGY

## 2024-01-22 PROCEDURE — 99999 PR PBB SHADOW E&M-EST. PATIENT-LVL III: CPT | Mod: PBBFAC,,, | Performed by: PSYCHIATRY & NEUROLOGY

## 2024-01-22 PROCEDURE — 1160F RVW MEDS BY RX/DR IN RCRD: CPT | Mod: CPTII,S$GLB,, | Performed by: PSYCHIATRY & NEUROLOGY

## 2024-01-22 PROCEDURE — 99214 OFFICE O/P EST MOD 30 MIN: CPT | Mod: S$GLB,,, | Performed by: PSYCHIATRY & NEUROLOGY

## 2024-01-22 NOTE — PROGRESS NOTES
"Subjective:          Patient ID: Dory Raza is a 48 y.o. male who presents today for a routine clinic visit for MS.      MS HPI:  DMT: glatiramer acetate  Side effects from DMT? No  Taking vitamin D3 as recommended? Yes - 5,000 IU/ day   "I would not know a symptom if I had one".    No sense of disability  Takes Vyvanse prn  Tore his calf muscle in December at work -- getting better now.      Medications:  Current Outpatient Medications   Medication Sig    cholecalciferol, vitamin D3, (VITAMIN D3) 50 mcg (2,000 unit) Cap Take 1 capsule by mouth once daily. Take 6000 IU daily.    glatiramer (COPAXONE) 40 mg/mL injection INJECT 40 MG INTO THE SKIN THREE TIMES A WEEK.    tadalafiL (CIALIS) 20 MG Tab Take 1 tablet (20 mg total) by mouth daily as needed (ED).    lisdexamfetamine (VYVANSE) 40 MG Cap Take 1 capsule (40 mg total) by mouth every morning.       SOCIAL HISTORY  Social History     Tobacco Use    Smoking status: Former     Current packs/day: 0.00     Average packs/day: 1 pack/day for 10.0 years (10.0 ttl pk-yrs)     Types: Cigarettes     Quit date: 2010     Years since quittin.9    Smokeless tobacco: Never   Substance Use Topics    Alcohol use: Yes     Comment: socially    Drug use: No     Living arrangements - the patient lives with their family.  Works full time as a flight nurse; recently finished school for NP        2024     8:25 AM   REVIEW OF SYMPTOMS   Do you feel abnormally tired on most days? No   Do you feel you generally sleep well? Yes   Do you have difficulty controlling your bladder?  No   Do you have difficulty controlling your bowels?  No   Do you have frequent muscle cramps, tightness or spasms in your limbs?  No   Do you have new visual symptoms?  No   Do you have worsening difficulty with your memory or thinking? No   Do you have worsening symptoms of anxiety or depression?  No   For patients who walk, Do you have more difficulty walking?  No   Have you fallen since your " last visit?  No   For patients who use wheelchairs: Do you have any skin wounds or breakdown? No   Do you have difficulty using your hands?  No   Do you have shooting or burning pain? No   Do you have difficulty with sexual function?  Yes   If you are sexually active, are you using birth control? Y/N  N/A No   Do you often choke when swallowing liquids or solid food?  No   Do you experience worsening symptoms when overheated? No   Do you need any new equipment such as a wheelchair, walker or shower chair? No   Do you receive co-pay financial assistance for your principal MS medicine? Yes   Would you be interested in participating in an MS research trial in the future? Yes   For patients on Gilenya, Tecfidera, Aubagio, Rituxan, Ocrevus, Tysabri, Lemtrada or Methotrexate, are you aware that you should NOT receive live virus vaccines?  Not Applicable   Do you feel you have adequate family/friend support?  Yes   Do you have health insurance?   Yes   Are you currently employed? Yes   Do you receive SSDI/SSI?  Not Applicable   Do you use marijuana or cannabis products? No   Have you been diagnosed with a urinary tract infection since your last visit here? No   Have you been diagnosed with a respiratory tract infection since your last visit here? No   Have you been to the emergency room since your last visit here? Yes   Have you been hospitalized since your last visit here?  No                    Objective:            7/13/2023    12:01 AM 1/22/2024    12:01 AM   Timed 25 Foot Walk:   Did patient wear an AFO? No No   Was assistive device used? No No   Time for 25 Foot Walk (seconds) 3 2.9   Time for 25 Foot Walk (seconds) 2.8      Neurologic Exam    MENTAL STATUS: grossly intact  CRANIAL NERVE EXAM: There is no internuclear ophthalmoplegia. Extraocular   muscles are intact.  No facial   asymmetry.There is no dysarthria.   MOTOR EXAM: Normal bulk and tone throughout UE and LE bilaterally. Rapid sequential movements are  normal. Strength is 5/5 in all groups   in the lower extremities and upper extremities.   REFLEXES: Symmetric and 2+ throughout in all 4 extremities.   SENSORY EXAM: Normal to vibration t/o  COORDINATION: Normal finger-to-nose exam.   GAIT: Narrow based and stable.    Imaging:     Results for orders placed during the hospital encounter of 11/02/23    MRI Brain Demyelinating Without Contrast    Impression  Stable appearance of the brain with unchanged white matter lesions in keeping with history of multiple sclerosis.  No new discrete lesions to indicate interval demyelination.      Electronically signed by: Channing Chase  Date:    11/02/2023  Time:    10:32    Results for orders placed during the hospital encounter of 09/24/20    MRI Cervical Spine Demyelinating Without Contrast    Impression  1. The axial images are motion degraded.  This somewhat limits evaluation of foraminal stenosis.  Otherwise, there is no fracture or malalignment.  There is no spinal stenosis or cord compression.  2. There are stable regions of abnormal signal intensity in the cord.  Again, the largest lesion is seen in the posterior central cord at the level of C3-4.  There is a 2nd punctate focus of abnormal signal in the right posterior cord at the level of inferior C3.  These findings are consistent with the provided diagnosis of multiple sclerosis without obvious interval progression.  3. There is multilevel degenerative change.  There is however no significant spinal stenosis at any level.  4. There is mild-to-moderate left foraminal narrowing at the C3-4 level.  There is stable moderate left foraminal stenosis at the C4-5 level.  5. There may be mild bilateral foraminal narrowing at the C6-7 level.  Please see above discussion.      Electronically signed by: Abdirashid Barnes MD  Date:    09/24/2020  Time:    10:45    No results found for this or any previous visit.    No results found for this or any previous visit.    No results found  "for this or any previous visit.    No results found for this or any previous visit.        Labs:     Lab Results   Component Value Date    [id number] 58 08/24/2023    [id number] 72 06/06/2022    CXZOYGHM09BF 109 (H) 11/03/2021     No results found for: "JCVINDEX", "JCVANTIBODY"  No results found for: "YD7SFJCG", "ABSOLUTECD3", "GL0QZFUI", "ABSOLUTECD8", "DQ5GAFSO", "ABSOLUTECD4", "LABCD48"  Lab Results   Component Value Date    WBC 8.39 08/24/2023    RBC 5.90 08/24/2023    HGB 16.9 08/24/2023    HCT 50.3 08/24/2023    MCV 85 08/24/2023    MCH 28.6 08/24/2023    MCHC 33.6 08/24/2023    RDW 12.5 08/24/2023     08/24/2023    MPV 10.0 08/24/2023    GRAN 3.6 11/03/2021    GRAN 61.4 11/03/2021    LYMPH 1.7 11/03/2021    LYMPH 29.4 11/03/2021    MONO 0.4 11/03/2021    MONO 7.1 11/03/2021    EOS 0.1 11/03/2021    BASO 0.06 11/03/2021    EOSINOPHIL 0.9 11/03/2021    BASOPHIL 1.0 11/03/2021     Sodium   Date Value Ref Range Status   08/24/2023 142 136 - 145 mmol/L Final     Potassium   Date Value Ref Range Status   08/24/2023 4.1 3.5 - 5.1 mmol/L Final     Chloride   Date Value Ref Range Status   08/24/2023 106 95 - 110 mmol/L Final     CO2   Date Value Ref Range Status   08/24/2023 25 23 - 29 mmol/L Final     Glucose   Date Value Ref Range Status   08/24/2023 88 70 - 110 mg/dL Final     BUN   Date Value Ref Range Status   08/24/2023 13 6 - 20 mg/dL Final     Creatinine   Date Value Ref Range Status   08/24/2023 1.1 0.5 - 1.4 mg/dL Final     Calcium   Date Value Ref Range Status   08/24/2023 9.8 8.7 - 10.5 mg/dL Final     Total Protein   Date Value Ref Range Status   08/24/2023 7.2 6.0 - 8.4 g/dL Final     Albumin   Date Value Ref Range Status   08/24/2023 4.4 3.5 - 5.2 g/dL Final     Total Bilirubin   Date Value Ref Range Status   08/24/2023 0.9 0.1 - 1.0 mg/dL Final     Comment:     For infants and newborns, interpretation of results should be based  on gestational age, weight and in agreement with " No "clinical  observations.    Premature Infant recommended reference ranges:  Up to 24 hours.............<8.0 mg/dL  Up to 48 hours............<12.0 mg/dL  3-5 days..................<15.0 mg/dL  6-29 days.................<15.0 mg/dL       Alkaline Phosphatase   Date Value Ref Range Status   08/24/2023 88 55 - 135 U/L Final     AST   Date Value Ref Range Status   08/24/2023 14 10 - 40 U/L Final     ALT   Date Value Ref Range Status   08/24/2023 15 10 - 44 U/L Final     Anion Gap   Date Value Ref Range Status   08/24/2023 11 8 - 16 mmol/L Final     eGFR if    Date Value Ref Range Status   11/03/2021 >60.0 >60 mL/min/1.73 m^2 Final     eGFR if non    Date Value Ref Range Status   11/03/2021 >60.0 >60 mL/min/1.73 m^2 Final     Comment:     Calculation used to obtain the estimated glomerular filtration  rate (eGFR) is the CKD-EPI equation.        No results found for: "HEPBSAG", "HEPBSAB", "HEPBCAB"        MS Impression and Plan:     NEURO MULTIPLE SCLEROSIS IMPRESSION:   MS Status:     Number of relapses in the past year?:  0    Clinical Progression:  Clinically Stable    MRI Progression:  Stable  Plan:     DMT:  No change in management    Symptom Management:  No change in symptom management     MING  Continue GA and vit D   Add C spine to next MRIs in Nov  F/u Katrin Sesay [facility] in 6mo         Problem List Items Addressed This Visit          1 - High    Multiple sclerosis       Unprioritized    Prophylactic immunotherapy - Primary     Other Visit Diagnoses       Counseling regarding goals of care        Abnormal finding on MRI of brain                Ale Hernandez MD    I spent a total of 35 minutes on the day of the visit.This includes face to face time and non-face to face time preparing to see the patient (eg, review of tests), obtaining and/or reviewing separately obtained history, documenting clinical information in the electronic or other health record, independently interpreting " results and communicating results to the patient/family/caregiver, or care coordinator.

## 2024-01-30 ENCOUNTER — OFFICE VISIT (OUTPATIENT)
Dept: ORTHOPEDICS | Facility: CLINIC | Age: 49
End: 2024-01-30
Payer: COMMERCIAL

## 2024-01-30 DIAGNOSIS — S86.112A GASTROCNEMIUS STRAIN, LEFT, INITIAL ENCOUNTER: Primary | ICD-10-CM

## 2024-01-30 PROCEDURE — 99213 OFFICE O/P EST LOW 20 MIN: CPT | Mod: S$GLB,,, | Performed by: ORTHOPAEDIC SURGERY

## 2024-01-30 PROCEDURE — 99999 PR PBB SHADOW E&M-EST. PATIENT-LVL I: CPT | Mod: PBBFAC,,, | Performed by: ORTHOPAEDIC SURGERY

## 2024-01-30 NOTE — PROGRESS NOTES
Status/Diagnosis: Left medial gastroc strain.  Date of Surgery: none  Date of Injury: 12/08/2023  Return visit:  PRN  X-rays on Return: pending patient complaint    Chief Complaint:   Chief Complaint   Patient presents with    Right Lower Leg - Injury     Present History:  WORK COMP CASE  Dory Raza is a 48 y.o. male with acute onset left calf pain.  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  Injury occurred on 12/08/2023.  Initially seen in the emergency department at which time x-rays were taken read as negative.  Currently working with physical therapy.  Presents today to my clinic for evaluation and treatment.  Denies any calf pain prior to the above-noted injury.  Pain currently 5/10.  Denies any numbness or tingling.  Past medical history significant for multiple sclerosis.  Denies tobacco use.     01/02/2024:  Patient returns today for repeat clinical evaluation.  Overall doing significantly better in regard to pain.  0/10.  He was wearing the tall boot with a small heel wedge in place.  Has been performing home exercises as previously discussed.  Does not require any pain medication.  Currently on light duty for work.    01/30/2024:  Patient returns today for repeat clinical evaluation.  Overall patient has made significant improvement in regard to his recovery.  Still participating in physical therapy.  Doing his home exercise program as instructed.  0/10 pain.  He has been back to work for the last 2+ weeks.  No new injuries.  Denies any numbness or tingling.      Past Medical History:   Diagnosis Date    ADD (attention deficit disorder) 2014    DDD (degenerative disc disease), lumbosacral     L5-S1    High cholesterol     MS (multiple sclerosis) 2014       Past Surgical History:   Procedure Laterality Date    ADENOIDECTOMY  1981    SHOULDER SURGERY  2001    bankart repair on left shoulder    SLEEVE GASTROPLASTY  2021    [place]  255 lbs    TONSILLECTOMY       VASECTOMY         Current Outpatient Medications   Medication Sig    cholecalciferol, vitamin D3, (VITAMIN D3) 50 mcg (2,000 unit) Cap Take 1 capsule by mouth once daily. Take 6000 IU daily.    glatiramer (COPAXONE) 40 mg/mL injection INJECT 40 MG INTO THE SKIN THREE TIMES A WEEK.    lisdexamfetamine (VYVANSE) 40 MG Cap Take 1 capsule (40 mg total) by mouth every morning.    tadalafiL (CIALIS) 20 MG Tab Take 1 tablet (20 mg total) by mouth daily as needed (ED).     No current facility-administered medications for this visit.       Review of patient's allergies indicates:   Allergen Reactions    Proamatine [midodrine] Other (See Comments)     Vasectomy       Family History   Problem Relation Age of Onset    Thyroid cancer Mother     Cancer Mother         Thyroid Cancer    Cancer Father         . Non-Hodgkin Lymphoma    Hodgkin's lymphoma Father     Diabetes Maternal Grandmother             Hypertension Maternal Grandmother             Stroke Maternal Grandmother             Macular degeneration Maternal Grandfather     Hypertension Maternal Grandfather             Skin cancer Maternal Grandfather     Diabetes Maternal Grandfather             Blindness Maternal Grandfather         wet AMD    Glaucoma Maternal Grandfather     Cancer Maternal Grandfather         . 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   Tobacco Use    Smoking status: Former     Current packs/day: 0.00     Average packs/day: 1 pack/day for 10.0 years (10.0 ttl pk-yrs)     Types: Cigarettes     Quit date: 2010      Years since quittin.9    Smokeless tobacco: Never   Substance and Sexual Activity    Alcohol use: Yes     Comment: socially    Drug use: No    Sexual activity: Yes     Partners: Female     Birth control/protection: See Surgical Hx     Social Determinants of Health     Financial Resource Strain: Medium Risk (12/15/2023)    Overall Financial Resource Strain (CARDIA)     Difficulty of Paying Living Expenses: Somewhat hard   Food Insecurity: No Food Insecurity (12/15/2023)    Hunger Vital Sign     Worried About Running Out of Food in the Last Year: Never true     Ran Out of Food in the Last Year: Never true   Transportation Needs: No Transportation Needs (12/15/2023)    PRAPARE - Transportation     Lack of Transportation (Medical): No     Lack of Transportation (Non-Medical): No   Physical Activity: Sufficiently Active (12/15/2023)    Exercise Vital Sign     Days of Exercise per Week: 4 days     Minutes of Exercise per Session: 40 min   Stress: No Stress Concern Present (12/15/2023)    [employer] [place] [employer] - Occupational Stress Questionnaire     Feeling of Stress : Only a little   Social Connections: Unknown (12/15/2023)    Social Connection and Isolation Panel [NHANES]     Frequency of Communication with Friends and Family: More than three times a week     Frequency of Social Gatherings with Friends and Family: Twice a week     Active Member of Clubs or Organizations: Yes     Attends Club or Organization Meetings: Never     Marital Status:    Housing Stability: High Risk (12/15/2023)    Housing Stability Vital Sign     Unable to Pay for Housing in the Last Year: Yes     Number of Places Lived in the Last Year: 1     Unstable Housing in the Last Year: No       Physical exam:  There were no vitals filed for this visit.  There is no height or weight on file to calculate BMI.  General: In no apparent distress; well developed and well nourished.  HEENT: normocephalic;  atraumatic.  Cardiovascular: regular rate.  Respiratory: no increased work of breathing.  Musculoskeletal:   Gait: nonantalgic  Inspection:   No residual swelling involving the left calf musculature.  No tenderness on deep palpation.  No palpable defect.  Good tension with Ann testing.  Symmetric ankle plantar flexion with the patient prone and knees flexed 90°.  Silfverskiold: Negative  Alignment:  Knee: neutral               Ankle: neutral              Hindfoot: neutral              Forefoot: neutral   Strength:              Dorsiflexion 5/5  Plantar flexion 5/5 without pain  Inversion 5/5  Eversion 5/5  Sensation:              SILT distally  ROM:              Ankle and subtalar:  Full without pain  Pulses: Palpable pedal pulse                   Imaging Studies/Outside documentation:  I have ordered/reviewed/interpreted the following images/outside documentation:  No new imaging today.     Assessment:  Dory Raza is a 48 y.o. male with Left medial gastroc strain.     Plan:   Again note significant improvement in regard to patient recovery with physical therapy for rehabilitation.  Patient has been back to work for the last several weeks.    Patient may return to work at full duty without restriction going forward.  Regarding physical therapy, patient may continue as needed.  Okay to transition to a home exercise program from my standpoint.    Patient voiced understanding.  All questions were answered.  He will follow up on an as-needed basis.    This note was created using voice recognition software and may contain grammatical errors.

## 2024-02-01 ENCOUNTER — PATIENT MESSAGE (OUTPATIENT)
Dept: ADMINISTRATIVE | Facility: OTHER | Age: 49
End: 2024-02-01
Payer: COMMERCIAL

## 2024-02-16 ENCOUNTER — OCCUPATIONAL HEALTH (OUTPATIENT)
Dept: URGENT CARE | Facility: CLINIC | Age: 49
End: 2024-02-16

## 2024-02-16 DIAGNOSIS — Z13.9 ENCOUNTER FOR SCREENING: Primary | ICD-10-CM

## 2024-02-16 PROCEDURE — 99002 DEVICE HANDLING PHYS/QHP: CPT | Mod: S$GLB,,, | Performed by: NURSE PRACTITIONER

## 2024-02-16 PROCEDURE — 99080 SPECIAL REPORTS OR FORMS: CPT | Mod: S$GLB,,, | Performed by: NURSE PRACTITIONER

## 2024-02-16 PROCEDURE — 92552 PURE TONE AUDIOMETRY AIR: CPT | Mod: S$GLB,,, | Performed by: NURSE PRACTITIONER

## 2024-02-16 PROCEDURE — 86480 TB TEST CELL IMMUN MEASURE: CPT | Mod: S$GLB,,, | Performed by: NURSE PRACTITIONER

## 2024-04-08 ENCOUNTER — OFFICE VISIT (OUTPATIENT)
Dept: OTOLARYNGOLOGY | Facility: CLINIC | Age: 49
End: 2024-04-08
Payer: COMMERCIAL

## 2024-04-08 VITALS — BODY MASS INDEX: 26.86 KG/M2 | WEIGHT: 187.63 LBS | HEIGHT: 70 IN

## 2024-04-08 DIAGNOSIS — L98.9 BENIGN SKIN LESION OF NOSE: Primary | ICD-10-CM

## 2024-04-08 PROCEDURE — 99203 OFFICE O/P NEW LOW 30 MIN: CPT | Mod: S$GLB,,, | Performed by: OTOLARYNGOLOGY

## 2024-04-08 PROCEDURE — 1160F RVW MEDS BY RX/DR IN RCRD: CPT | Mod: CPTII,S$GLB,, | Performed by: OTOLARYNGOLOGY

## 2024-04-08 PROCEDURE — 99999 PR PBB SHADOW E&M-EST. PATIENT-LVL III: CPT | Mod: PBBFAC,,, | Performed by: OTOLARYNGOLOGY

## 2024-04-08 PROCEDURE — 1159F MED LIST DOCD IN RCRD: CPT | Mod: CPTII,S$GLB,, | Performed by: OTOLARYNGOLOGY

## 2024-04-08 PROCEDURE — 3008F BODY MASS INDEX DOCD: CPT | Mod: CPTII,S$GLB,, | Performed by: OTOLARYNGOLOGY

## 2024-04-08 NOTE — PATIENT INSTRUCTIONS
Nasal Moisture Therapy:    A dry nose can cause crusting, pain, bleeding, nasal congestion, and intermittent drainage. It is important to keep the nose moisturized. This is the best way to reduce the risk of bleeding, as it prevents the blood vessels from coming to the surface and opening up.     Nasal emollients (moisturizers) are most important. There are multiple over the counter or natural products available, including many that are around the house. Ones that I like are:  Coconut oil, Aquaphor, Ponaris nasal ointment, Vaseline or Mupirocin bacterial ointment if there is also an infection associated with it. For any of these:  Deposit a pea or dime sized amount into the entrance to the nasal cavity with a q-tip or pinky finger. Apply to the septum (portion that divides the left and right side) as this is the area where crusting and bleeding develops more commonly  Perform this at least 2 times daily, including before bed. This allows the ointment to melt along the nasal cavity when you lay down.  Nasal saline spray or irrigations can help wash away mucous and crusting and keep the nose healthy. Perform BEFORE applying the nasal emollient  Use a humidifier in the bedroom  If you use CPAP, make sure it has humidification on it.  Be aware that medical nasal sprays can occasionally dry the nose out, so keep the nose moist while using these medications.

## 2024-04-08 NOTE — PROGRESS NOTES
Subjective:       Patient ID: Dory Raza is a 49 y.o. male.    Chief Complaint: Sinus Problem (Small nodule in L nostril / on-off pain X few wks )    Dory is here for growth and left nasal cavity.   Length of symptoms: week / months - will occ cause some discomfort mainly with manipulation. No bleeding. No nasal obstruction.    Pertinent medical issues: MS, GRICELDA    Patient validated questionnaires (if applicable):  SNOT-22 score: : (P) 3  NOSE score:: (P) 0%  ETDQ-7 score:: (P) 1         No data to display                   No data to display                   No data to display                     Social History     Tobacco Use   Smoking Status Former    Current packs/day: 0.00    Average packs/day: 1 pack/day for 10.0 years (10.0 ttl pk-yrs)    Types: Cigarettes    Quit date: 2010    Years since quittin.1   Smokeless Tobacco Never     Social History     Substance and Sexual Activity   Alcohol Use Yes    Comment: socially          Objective:        Constitutional:   He is oriented to person, place, and time. He appears well-developed and well-nourished. He appears alert. He is active. Normal speech.      Head:  Normocephalic and atraumatic. Head is without TMJ tenderness. No scars. Salivary glands normal.  Facial strength is normal.      Ears:    Right Ear: No drainage or swelling. No middle ear effusion.   Left Ear: No drainage or swelling.  No middle ear effusion.     Nose:  No mucosal edema, rhinorrhea or sinus tenderness. No turbinate hypertrophy.    Benign thickening of skin of left nasal vestibule over the columella, beginnings of the skin tag    Mouth/Throat  Oropharynx clear and moist without lesions or asymmetry, normal uvula midline and mirror exam normal. Normal dentition. No uvula swelling, lacerations or trismus. No oropharyngeal exudate. Tonsillar erythema, tonsillar exudate.      Neck:  Full range of motion with neck supple and no adenopathy. Thyroid tenderness is present. No tracheal  deviation, no edema, no erythema, normal range of motion, no stridor, no crepitus and no neck rigidity present. No thyroid mass present.     Cardiovascular:    Intact distal pulses and normal pulses.              Pulmonary/Chest:   Effort normal and breath sounds normal. No stridor.     Psychiatric:   His speech is normal and behavior is normal. His mood appears not anxious. His affect is not labile.     Neurological:   He is alert and oriented to person, place, and time. No sensory deficit.     Skin:   No abrasions, lacerations, lesions, or rashes. No abrasion and no bruising noted.         Tests / Results:  MRI brain from November 2023 shows relatively normal appearing paranasal sinuses    Assessment:       1. Benign skin lesion of nose          Plan:         Reassurance provided  Moisturize for now to see if improved.  RTC if symptoms worsen - discussed what to monitor for

## 2024-05-02 ENCOUNTER — PATIENT MESSAGE (OUTPATIENT)
Dept: PSYCHIATRY | Facility: CLINIC | Age: 49
End: 2024-05-02
Payer: COMMERCIAL

## 2024-05-20 ENCOUNTER — PATIENT MESSAGE (OUTPATIENT)
Dept: PSYCHIATRY | Facility: CLINIC | Age: 49
End: 2024-05-20
Payer: COMMERCIAL

## 2024-05-26 DIAGNOSIS — R53.83 FATIGUE, UNSPECIFIED TYPE: ICD-10-CM

## 2024-05-26 DIAGNOSIS — N52.9 ERECTILE DYSFUNCTION, UNSPECIFIED ERECTILE DYSFUNCTION TYPE: ICD-10-CM

## 2024-05-26 DIAGNOSIS — G35 MULTIPLE SCLEROSIS: ICD-10-CM

## 2024-05-28 DIAGNOSIS — G35 MULTIPLE SCLEROSIS: ICD-10-CM

## 2024-05-29 RX ORDER — GLATIRAMER 40 MG/ML
40 INJECTION, SOLUTION SUBCUTANEOUS
Qty: 36 ML | Refills: 1 | Status: ACTIVE | OUTPATIENT
Start: 2024-05-29

## 2024-05-30 RX ORDER — TADALAFIL 20 MG/1
20 TABLET ORAL DAILY PRN
Qty: 6 TABLET | Status: SHIPPED | OUTPATIENT
Start: 2024-05-30 | End: 2025-05-30

## 2024-05-30 RX ORDER — LISDEXAMFETAMINE DIMESYLATE 40 MG/1
40 CAPSULE ORAL EVERY MORNING
Qty: 30 CAPSULE | Refills: 0 | Status: SHIPPED | OUTPATIENT
Start: 2024-05-30 | End: 2024-06-29

## 2024-06-27 ENCOUNTER — PATIENT MESSAGE (OUTPATIENT)
Dept: ADMINISTRATIVE | Facility: OTHER | Age: 49
End: 2024-06-27
Payer: COMMERCIAL

## 2024-07-19 ENCOUNTER — OFFICE VISIT (OUTPATIENT)
Dept: NEUROLOGY | Facility: CLINIC | Age: 49
End: 2024-07-19
Payer: COMMERCIAL

## 2024-07-19 VITALS
HEIGHT: 70 IN | SYSTOLIC BLOOD PRESSURE: 133 MMHG | HEART RATE: 74 BPM | DIASTOLIC BLOOD PRESSURE: 81 MMHG | BODY MASS INDEX: 26.4 KG/M2 | WEIGHT: 184.44 LBS

## 2024-07-19 DIAGNOSIS — Z71.89 COUNSELING REGARDING GOALS OF CARE: ICD-10-CM

## 2024-07-19 DIAGNOSIS — G35 MULTIPLE SCLEROSIS: Primary | ICD-10-CM

## 2024-07-19 DIAGNOSIS — R53.83 FATIGUE, UNSPECIFIED TYPE: ICD-10-CM

## 2024-07-19 DIAGNOSIS — Z29.89 PROPHYLACTIC IMMUNOTHERAPY: ICD-10-CM

## 2024-07-19 PROCEDURE — G2211 COMPLEX E/M VISIT ADD ON: HCPCS | Mod: S$GLB,,, | Performed by: CLINICAL NURSE SPECIALIST

## 2024-07-19 PROCEDURE — 1159F MED LIST DOCD IN RCRD: CPT | Mod: CPTII,S$GLB,, | Performed by: CLINICAL NURSE SPECIALIST

## 2024-07-19 PROCEDURE — 99999 PR PBB SHADOW E&M-EST. PATIENT-LVL III: CPT | Mod: PBBFAC,,, | Performed by: CLINICAL NURSE SPECIALIST

## 2024-07-19 PROCEDURE — 3075F SYST BP GE 130 - 139MM HG: CPT | Mod: CPTII,S$GLB,, | Performed by: CLINICAL NURSE SPECIALIST

## 2024-07-19 PROCEDURE — 3079F DIAST BP 80-89 MM HG: CPT | Mod: CPTII,S$GLB,, | Performed by: CLINICAL NURSE SPECIALIST

## 2024-07-19 PROCEDURE — 3008F BODY MASS INDEX DOCD: CPT | Mod: CPTII,S$GLB,, | Performed by: CLINICAL NURSE SPECIALIST

## 2024-07-19 PROCEDURE — 99214 OFFICE O/P EST MOD 30 MIN: CPT | Mod: S$GLB,,, | Performed by: CLINICAL NURSE SPECIALIST

## 2024-07-19 RX ORDER — LISDEXAMFETAMINE DIMESYLATE 40 MG/1
40 CAPSULE ORAL EVERY MORNING
Qty: 30 CAPSULE | Refills: 0 | Status: SHIPPED | OUTPATIENT
Start: 2024-07-19 | End: 2024-08-18

## 2024-07-19 NOTE — PROGRESS NOTES
Subjective:          Patient ID: Dory Raza is a 49 y.o. male who presents today for a routine clinic visit for MS.  He was last seen in January by Dr. Hernandez. The history has been provided by the patient.     MS HPI:  DMT: Copaxone   Side effects from DMT? No  Taking vitamin D3 as recommended? 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   He denies any recent infections.   He denies any signs of relapse. He denies any falls.   His daughter is pregnant and due in October.     Medications:  Current Outpatient Medications   Medication Sig    cholecalciferol, vitamin D3, (VITAMIN D3) 50 mcg (2,000 unit) Cap Take 1 capsule by mouth once daily. Take 6000 IU daily.    glatiramer (COPAXONE) 40 mg/mL injection INJECT 40 MG INTO THE SKIN THREE TIMES A WEEK.    tadalafiL (CIALIS) 20 MG Tab Take 1 tablet (20 mg total) by mouth daily as needed (ED).    lisdexamfetamine (VYVANSE) 40 MG Cap Take 1 capsule (40 mg total) by mouth every morning.     No current facility-administered medications for this visit.       SOCIAL HISTORY  Social History     Tobacco Use    Smoking status: Former     Current packs/day: 0.00     Average packs/day: 1 pack/day for 10.0 years (10.0 ttl pk-yrs)     Types: Cigarettes     Quit date: 2010     Years since quittin.4    Smokeless tobacco: Never   Substance Use Topics    Alcohol use: Yes     Comment: socially    Drug use: No       Living arrangements - the patient lives with her family     ROS:      24    REVIEW OF SYMPTOMS   Do you feel abnormally tired on most days? No   Do you feel you generally sleep well? Yes    Do you have difficulty controlling your bladder?  No   Do you have difficulty controlling your bowels?  No   Do you have frequent muscle cramps, tightness or spasms in your limbs?  No   Do you have new visual symptoms?  No--wears glasses; age-related; sees eye doctor annually    Do you have worsening  difficulty with your memory or thinking? No   Do you have worsening symptoms of anxiety or depression?  No--stress    For patients who walk, Do you have more difficulty walking?  No   Have you fallen since your last visit?  No   For patients who use wheelchairs: Do you have any skin wounds or breakdown? No   Do you have difficulty using your hands?  No   Do you have shooting or burning pain? No   Do you have difficulty with sexual function?  Yes   If you are sexually active, are you using birth control? Y/N  N/A No   Do you often choke when swallowing liquids or solid food?  No   Do you experience worsening symptoms when overheated? No   Do you need any new equipment such as a wheelchair, walker or shower chair? No   Do you receive co-pay financial assistance for your principal MS medicine? Yes   Would you be interested in participating in an MS research trial in the future? Yes   For patients on Gilenya, Tecfidera, Aubagio, Rituxan, Ocrevus, Tysabri, Lemtrada or Methotrexate, are you aware that you should NOT receive live virus vaccines?  Not Applicable   Do you feel you have adequate family/friend support?  Yes   Do you have health insurance?   Yes   Are you currently employed? Yes   Do you receive SSDI/SSI?  Not Applicable   Do you use marijuana or cannabis products? No   Have you been diagnosed with a urinary tract infection since your last visit here? No   Have you been diagnosed with a respiratory tract infection since your last visit here? No   Have you been to the emergency room since your last visit here? No   Have you been hospitalized since your last visit here?  No              Objective:        1. 25 foot timed walk:      1/22/2024    12:01 AM 7/19/2024    12:01 AM   Timed 25 Foot Walk:   Did patient wear an AFO? No No   Was assistive device used? No No   Time for 25 Foot Walk (seconds) 2.9 3   Time for 25 Foot Walk (seconds)  3.2       Neurologic Exam     Mental Status   Oriented to person, place, and  time.   Attention: normal. Concentration: normal.   Speech: speech is normal   Level of consciousness: alert  Knowledge: good.   Normal comprehension.     Cranial Nerves     CN II   Visual acuity: normal with correction    CN III, IV, VI   Extraocular motions are normal.   Nystagmus: none     CN V   Right facial sensation deficit: none  Left facial sensation deficit: none    CN VII   Right facial weakness: none  Left facial weakness: none    CN VIII   Hearing: intact    CN IX, X   Palate: symmetric    CN XI   Right sternocleidomastoid strength: normal  Left sternocleidomastoid strength: normal  Right trapezius strength: normal  Left trapezius strength: normal    CN XII   Tongue deviation: none    Motor Exam   Muscle bulk: normal  Overall muscle tone: normal  Right arm tone: normal  Left arm tone: normal  Right leg tone: normal  Left leg tone: normal    Strength   Right neck flexion: 5/5  Left neck flexion: 5/5  Right neck extension: 5/5  Left neck extension: 5/5  Right deltoid: 5/5  Left deltoid: 5/5  Right biceps: 5/5  Left biceps: 5/5  Right triceps: 5/5  Left triceps: 5/5  Right wrist flexion: 5/5  Left wrist flexion: 5/5  Right wrist extension: 5/5  Left wrist extension: 5/5  Right interossei: 5/5  Left interossei: 5/5  Right iliopsoas: 5/5  Left iliopsoas: 5/5  Right quadriceps: 5/5  Left quadriceps: 5/5  Right hamstrin/5  Left hamstrin/5  Right anterior tibial: 5/5  Left anterior tibial: 5/5  Right gastroc: 5/5  Left gastroc: 5/5    Sensory Exam   Right arm vibration: normal  Left arm vibration: normal  Right leg vibration: normal  Left leg vibration: normal    Gait, Coordination, and Reflexes     Gait  Gait: normal    Coordination   Romberg: negative  Finger to nose coordination: normal  Heel to shin coordination: normal  Tandem walking coordination: normal    Tremor   Resting tremor: absent    Reflexes   Right brachioradialis: 2+  Left brachioradialis: 2+  Right biceps: 2+  Left biceps: 2+  Right  triceps: 2+  Left triceps: 2+  Right patellar: 2+  Left patellar: 2+  Right achilles: 2+  Left achilles: 2+  Right plantar: equivocal  Left plantar: equivocal  He is able to hop on each foot ten times.   Normal rapid sequential movements in upper extremities.          Imaging:     Results for orders placed during the hospital encounter of 11/02/23    MRI Brain Demyelinating Without Contrast    Impression  Stable appearance of the brain with unchanged white matter lesions in keeping with history of multiple sclerosis.  No new discrete lesions to indicate interval demyelination.      Electronically signed by: Channing Chase  Date:    11/02/2023  Time:    10:32        Labs:     Lab Results   Component Value Date    [id number] 58 08/24/2023    [id number] 72 06/06/2022    [id number] 109 (H) 11/03/2021     Lab Results   Component Value Date    WBC 8.39 08/24/2023    RBC 5.90 08/24/2023    HGB 16.9 08/24/2023    HCT 50.3 08/24/2023    MCV 85 08/24/2023    MCH 28.6 08/24/2023    MCHC 33.6 08/24/2023    RDW 12.5 08/24/2023     08/24/2023    MPV 10.0 08/24/2023    GRAN 3.6 11/03/2021    GRAN 61.4 11/03/2021    LYMPH 1.7 11/03/2021    LYMPH 29.4 11/03/2021    MONO 0.4 11/03/2021    MONO 7.1 11/03/2021    EOS 0.1 11/03/2021    BASO 0.06 11/03/2021    EOSINOPHIL 0.9 11/03/2021    BASOPHIL 1.0 11/03/2021     Sodium   Date Value Ref Range Status   08/24/2023 142 136 - 145 mmol/L Final     Potassium   Date Value Ref Range Status   08/24/2023 4.1 3.5 - 5.1 mmol/L Final     Chloride   Date Value Ref Range Status   08/24/2023 106 95 - 110 mmol/L Final     CO2   Date Value Ref Range Status   08/24/2023 25 23 - 29 mmol/L Final     Glucose   Date Value Ref Range Status   08/24/2023 88 70 - 110 mg/dL Final     BUN   Date Value Ref Range Status   08/24/2023 13 6 - 20 mg/dL Final     Creatinine   Date Value Ref Range Status   08/24/2023 1.1 0.5 - 1.4 mg/dL Final     Calcium   Date Value Ref Range Status   08/24/2023 9.8 8.7 - 10.5  mg/dL Final     Total Protein   Date Value Ref Range Status   08/24/2023 7.2 6.0 - 8.4 g/dL Final     Albumin   Date Value Ref Range Status   08/24/2023 4.4 3.5 - 5.2 g/dL Final     Total Bilirubin   Date Value Ref Range Status   08/24/2023 0.9 0.1 - 1.0 mg/dL Final     Comment:     For infants and newborns, interpretation of results should be based  on gestational age, weight and in agreement with clinical  observations.    Premature Infant recommended reference ranges:  Up to 24 hours.............<8.0 mg/dL  Up to 48 hours............<12.0 mg/dL  3-5 days..................<15.0 mg/dL  6-29 days.................<15.0 mg/dL       Alkaline Phosphatase   Date Value Ref Range Status   08/24/2023 88 55 - 135 U/L Final     AST   Date Value Ref Range Status   08/24/2023 14 10 - 40 U/L Final     ALT   Date Value Ref Range Status   08/24/2023 15 10 - 44 U/L Final     Anion Gap   Date Value Ref Range Status   08/24/2023 11 8 - 16 mmol/L Final     eGFR if    Date Value Ref Range Status   11/03/2021 >60.0 >60 mL/min/1.73 m^2 Final     eGFR if non    Date Value Ref Range Status   11/03/2021 >60.0 >60 mL/min/1.73 m^2 Final     Comment:     Calculation used to obtain the estimated glomerular filtration  rate (eGFR) is the CKD-EPI equation.                MS Impression and Plan:     NEURO MULTIPLE SCLEROSIS IMPRESSION:   Number of relapses in the past year?:  0  Clinical Progression:  Clinically Stable  MS Classification:  Relapsing-Remitting MS  DMT:  No change in management  DMT comment:  Continue glatiramer and Vit D.   Symptom Management:  Implement change in symptom management  Implement Change in Symptom Management:  Fatigue (Vyvanse refill sent to pharmacy.)     MRI brain is due in December.   He will follow up with Dr. Hernandez in 6 months.   The visit today is associated with current or anticipated ongoing medical care related to this patient's single serious condition/complex condition of  multiple sclerosis.      Total time spent with patient: 25 minutes   Total time spent on encounter: 30 minutes           RUSSEL Cowan, CNS    Problem List Items Addressed This Visit          Neurologic Problems    Multiple sclerosis - Primary    Relevant Medications    lisdexamfetamine (VYVANSE) 40 MG Cap    Other Relevant Orders    MRI Brain Demyelinating Without Contrast    Vitamin D     Other Visit Diagnoses       Fatigue, unspecified type        Relevant Medications    lisdexamfetamine (VYVANSE) 40 MG Cap

## 2024-07-25 ENCOUNTER — PATIENT MESSAGE (OUTPATIENT)
Dept: PSYCHIATRY | Facility: CLINIC | Age: 49
End: 2024-07-25
Payer: COMMERCIAL

## 2024-08-05 ENCOUNTER — PATIENT MESSAGE (OUTPATIENT)
Dept: PSYCHIATRY | Facility: CLINIC | Age: 49
End: 2024-08-05
Payer: COMMERCIAL

## 2024-09-12 ENCOUNTER — PATIENT MESSAGE (OUTPATIENT)
Dept: NEUROLOGY | Facility: CLINIC | Age: 49
End: 2024-09-12
Payer: COMMERCIAL

## 2024-09-12 DIAGNOSIS — N52.9 ERECTILE DYSFUNCTION, UNSPECIFIED ERECTILE DYSFUNCTION TYPE: ICD-10-CM

## 2024-09-12 RX ORDER — TADALAFIL 20 MG/1
20 TABLET ORAL DAILY PRN
Qty: 18 TABLET | Refills: 3 | Status: SHIPPED | OUTPATIENT
Start: 2024-09-12 | End: 2025-09-12

## 2024-10-07 ENCOUNTER — OFFICE VISIT (OUTPATIENT)
Dept: OPTOMETRY | Facility: CLINIC | Age: 49
End: 2024-10-07
Payer: COMMERCIAL

## 2024-10-07 DIAGNOSIS — G35 MULTIPLE SCLEROSIS: ICD-10-CM

## 2024-10-07 DIAGNOSIS — H52.203 MYOPIA WITH ASTIGMATISM AND PRESBYOPIA, BILATERAL: Primary | ICD-10-CM

## 2024-10-07 DIAGNOSIS — H52.4 MYOPIA WITH ASTIGMATISM AND PRESBYOPIA, BILATERAL: Primary | ICD-10-CM

## 2024-10-07 DIAGNOSIS — H52.13 MYOPIA WITH ASTIGMATISM AND PRESBYOPIA, BILATERAL: Primary | ICD-10-CM

## 2024-10-07 PROCEDURE — 92014 COMPRE OPH EXAM EST PT 1/>: CPT | Mod: S$GLB,,, | Performed by: OPTOMETRIST

## 2024-10-07 PROCEDURE — 1159F MED LIST DOCD IN RCRD: CPT | Mod: CPTII,S$GLB,, | Performed by: OPTOMETRIST

## 2024-10-07 PROCEDURE — 1160F RVW MEDS BY RX/DR IN RCRD: CPT | Mod: CPTII,S$GLB,, | Performed by: OPTOMETRIST

## 2024-10-07 PROCEDURE — 99999 PR PBB SHADOW E&M-EST. PATIENT-LVL III: CPT | Mod: PBBFAC,,, | Performed by: OPTOMETRIST

## 2024-10-07 PROCEDURE — 92015 DETERMINE REFRACTIVE STATE: CPT | Mod: S$GLB,,, | Performed by: OPTOMETRIST

## 2024-10-07 NOTE — PROGRESS NOTES
HPI    Pt here for annual eye exam DLS- 08/23/23    Pt has some complaints with near vision. DVA has been stable.   Denies F/F and GTTS.   Last edited by Rose Ayoub on 10/7/2024  2:06 PM.            Assessment /Plan     For exam results, see Encounter Report.    Myopia with astigmatism and presbyopia, bilateral    Multiple sclerosis      New Spectacle Rx given, discussed different options for glasses. RTC 1 year routine eye exam.  2. No ocular findings, no prev episodes of optic neuritis, Monitor condition. Patient to report any changes. RTC 1 year recheck.

## 2024-12-03 ENCOUNTER — HOSPITAL ENCOUNTER (OUTPATIENT)
Dept: RADIOLOGY | Facility: HOSPITAL | Age: 49
Discharge: HOME | End: 2024-12-03
Attending: CLINICAL NURSE SPECIALIST
Payer: COMMERCIAL

## 2024-12-03 DIAGNOSIS — G35 MULTIPLE SCLEROSIS: ICD-10-CM

## 2024-12-03 PROCEDURE — 70551 MRI BRAIN STEM W/O DYE: CPT | Mod: TC,PO

## 2024-12-03 PROCEDURE — 70551 MRI BRAIN STEM W/O DYE: CPT | Mod: 26,,, | Performed by: RADIOLOGY

## 2024-12-04 ENCOUNTER — PATIENT MESSAGE (OUTPATIENT)
Dept: NEUROLOGY | Facility: CLINIC | Age: 49
End: 2024-12-04
Payer: COMMERCIAL

## 2024-12-09 ENCOUNTER — OFFICE VISIT (OUTPATIENT)
Dept: NEUROLOGY | Facility: CLINIC | Age: 49
End: 2024-12-09
Payer: COMMERCIAL

## 2024-12-09 DIAGNOSIS — G35 MULTIPLE SCLEROSIS: Primary | ICD-10-CM

## 2024-12-09 PROCEDURE — G2211 COMPLEX E/M VISIT ADD ON: HCPCS | Mod: 95,,, | Performed by: CLINICAL NURSE SPECIALIST

## 2024-12-09 PROCEDURE — 1159F MED LIST DOCD IN RCRD: CPT | Mod: CPTII,95,, | Performed by: CLINICAL NURSE SPECIALIST

## 2024-12-09 PROCEDURE — 99215 OFFICE O/P EST HI 40 MIN: CPT | Mod: 95,,, | Performed by: CLINICAL NURSE SPECIALIST

## 2024-12-09 NOTE — PROGRESS NOTES
Subjective:          Patient ID: Dory Raza is a 49 y.o. male who presents today for a  fit-in clinic visit for MS.  He was last seen in 2024. The history has been provided by the patient.     MS HPI:  DMT: Glatiramer--has been adherent to his injections; he has missed only a few in the past year  Side effects from DMT? No  Taking vitamin D3 as recommended? Yes -  Dose: 2000 units daily   His most recent brain MRI shows 2 new lesions.   He has not had any obvious new symptoms.     Medications:  Current Outpatient Medications   Medication Sig    cholecalciferol, vitamin D3, (VITAMIN D3) 50 mcg (2,000 unit) Cap Take 1 capsule by mouth once daily. Take 6000 IU daily.    glatiramer (COPAXONE) 40 mg/mL injection INJECT 40 MG INTO THE SKIN THREE TIMES A WEEK.    lisdexamfetamine (VYVANSE) 40 MG Cap Take 1 capsule (40 mg total) by mouth every morning.    tadalafiL (CIALIS) 20 MG Tab Take 1 tablet (20 mg total) by mouth daily as needed (ED).     No current facility-administered medications for this visit.       SOCIAL HISTORY  Social History     Tobacco Use    Smoking status: Former     Current packs/day: 0.00     Average packs/day: 1 pack/day for 10.0 years (10.0 ttl pk-yrs)     Types: Cigarettes     Quit date: 2010     Years since quittin.8    Smokeless tobacco: Never   Substance Use Topics    Alcohol use: Yes     Comment: socially    Drug use: No       Living arrangements - the patient lives with his family              Objective:        1. 25 foot timed walk:      2024     9:00 AM 2024    10:30 AM   Timed 25 Foot Walk:   Did patient wear an AFO? No No   Was assistive device used? No No   Time for 25 Foot Walk (seconds) 2.9 3   Time for 25 Foot Walk (seconds)  3.2       Neurological Exam    Deferred   Imaging:     Results for orders placed during the hospital encounter of 24    MRI Brain Demyelinating Without Contrast    Impression  1. There is a mild burden of white matter disease  consistent with the provided diagnosis of multiple sclerosis.  There are 2 small regions of FLAIR and T2 hyperintense signal described above which have developed since the prior studies and represent mild interval progression of demyelinating disease but without suspected active demyelination.  Contrast was not administered.  2. There is no hemorrhage, mass or acute infarction.      Electronically signed by: Abdirashid Barnes MD  Date:    12/03/2024  Time:    09:56    Images were reviewed with the patient.   Labs:     Lab Results   Component Value Date    [id number] 58 08/24/2023    ZGAKFSSE30QM 72 06/06/2022    TMWFBQOU04JC 109 (H) 11/03/2021     Lab Results   Component Value Date    WBC 8.39 08/24/2023    RBC 5.90 08/24/2023    HGB 16.9 08/24/2023    HCT 50.3 08/24/2023    MCV 85 08/24/2023    MCH 28.6 08/24/2023    MCHC 33.6 08/24/2023    RDW 12.5 08/24/2023     08/24/2023    MPV 10.0 08/24/2023    GRAN 3.6 11/03/2021    GRAN 61.4 11/03/2021    LYMPH 1.7 11/03/2021    LYMPH 29.4 11/03/2021    MONO 0.4 11/03/2021    MONO 7.1 11/03/2021    EOS 0.1 11/03/2021    BASO 0.06 11/03/2021    EOSINOPHIL 0.9 11/03/2021    BASOPHIL 1.0 11/03/2021     Sodium   Date Value Ref Range Status   08/24/2023 142 136 - 145 mmol/L Final     Potassium   Date Value Ref Range Status   08/24/2023 4.1 3.5 - 5.1 mmol/L Final     Chloride   Date Value Ref Range Status   08/24/2023 106 95 - 110 mmol/L Final     CO2   Date Value Ref Range Status   08/24/2023 25 23 - 29 mmol/L Final     Glucose   Date Value Ref Range Status   08/24/2023 88 70 - 110 mg/dL Final     BUN   Date Value Ref Range Status   08/24/2023 13 6 - 20 mg/dL Final     Creatinine   Date Value Ref Range Status   08/24/2023 1.1 0.5 - 1.4 mg/dL Final     Calcium   Date Value Ref Range Status   08/24/2023 9.8 8.7 - 10.5 mg/dL Final     Total Protein   Date Value Ref Range Status   08/24/2023 7.2 6.0 - 8.4 g/dL Final     Albumin   Date Value Ref Range Status   08/24/2023 4.4  3.5 - 5.2 g/dL Final     Total Bilirubin   Date Value Ref Range Status   08/24/2023 0.9 0.1 - 1.0 mg/dL Final     Comment:     For infants and newborns, interpretation of results should be based  on gestational age, weight and in agreement with clinical  observations.    Premature Infant recommended reference ranges:  Up to 24 hours.............<8.0 mg/dL  Up to 48 hours............<12.0 mg/dL  3-5 days..................<15.0 mg/dL  6-29 days.................<15.0 mg/dL       Alkaline Phosphatase   Date Value Ref Range Status   08/24/2023 88 55 - 135 U/L Final     AST   Date Value Ref Range Status   08/24/2023 14 10 - 40 U/L Final     ALT   Date Value Ref Range Status   08/24/2023 15 10 - 44 U/L Final     Anion Gap   Date Value Ref Range Status   08/24/2023 11 8 - 16 mmol/L Final     eGFR if    Date Value Ref Range Status   11/03/2021 >60.0 >60 mL/min/1.73 m^2 Final     eGFR if non    Date Value Ref Range Status   11/03/2021 >60.0 >60 mL/min/1.73 m^2 Final     Comment:     Calculation used to obtain the estimated glomerular filtration  rate (eGFR) is the CKD-EPI equation.          Hep C negative, HIV negative, Hep B Surface Ab positive  (past vaccination) on 11/5/24--done in [facility]; patient showed me results   MS Impression and Plan:     NEURO MULTIPLE SCLEROSIS IMPRESSION:   Number of relapses in the past year?:  0  Clinical Progression:  Clinically Stable  MRI Progression:  Worsened  Worsening Reason:  New Lesions  MS Classification:  Relapsing-Remitting MS  Current DMT: glatiramer acetate  DMT:  Switch Disease Modifying therapy  DMT comment:  In light of new lesions, we will switch DMT from glatiramer to Kesimpta. We discussed risks and side effects of Kesimpta, including immunosuppression, increased risk of infection, and low immunoglobulins. We will check pre-immunosuppression labs next week at his request.   Symptom Management:  No change in symptom  management  Additional Impressions:   We will plan for repeat brain MRI at 6 months post Kesimpta start (with contrast).   He is open to pneumonia and shingles vaccines. He has received his flu shot.   He will follow up with Dr. Hernandez in 3 months.     The visit today is associated with current or anticipated ongoing medical care related to this patient's single serious condition/complex condition of multiple sclerosis.        RUSSEL Cowan, CNS    Problem List Items Addressed This Visit          Neurologic Problems    Multiple sclerosis - Primary    Relevant Orders    CBC Auto Differential    Comprehensive Metabolic Panel    [place]-Suppressor Ratio    Hepatitis A antibody, IgG    Hepatitis B Surface Antigen    Hepatitis B Core Antibody, Total    Immunoglobulins (IgG, IgA, IgM) Quantitative    Quantiferon Gold TB    STRATIFY JCV ANTIBODY (with Index)    Strongyloides IgG Antibodies    Varicella Zoster Antibody, IgG    Treponema Pallidium Antibodies IgG, IgM

## 2024-12-09 NOTE — Clinical Note
DL, we are going to start Kesimpta. His labs will be next Monday. He just did HIV, hep B Surface Ab, and hep C [facility], and these were negative (he showed me the results). He's an [employer] employee.

## 2024-12-10 ENCOUNTER — TELEPHONE (OUTPATIENT)
Dept: NEUROLOGY | Facility: CLINIC | Age: 49
End: 2024-12-10
Payer: COMMERCIAL

## 2024-12-10 NOTE — TELEPHONE ENCOUNTER
Spoke with pt in regards to getting him scheduled for labs and f/u, pt is scheduled for labs on 12/16/24 and f/u with BB on 03/10/25.

## 2024-12-16 ENCOUNTER — LAB VISIT (OUTPATIENT)
Dept: LAB | Facility: HOSPITAL | Age: 49
End: 2024-12-16
Attending: CLINICAL NURSE SPECIALIST
Payer: COMMERCIAL

## 2024-12-16 ENCOUNTER — PATIENT MESSAGE (OUTPATIENT)
Dept: PSYCHIATRY | Facility: CLINIC | Age: 49
End: 2024-12-16
Payer: COMMERCIAL

## 2024-12-16 DIAGNOSIS — G35 MULTIPLE SCLEROSIS: ICD-10-CM

## 2024-12-16 LAB
ALBUMIN SERPL BCP-MCNC: 4.2 G/DL (ref 3.5–5.2)
ALP SERPL-CCNC: 81 U/L (ref 40–150)
ALT SERPL W/O P-5'-P-CCNC: 16 U/L (ref 10–44)
ANION GAP SERPL CALC-SCNC: 10 MMOL/L (ref 8–16)
AST SERPL-CCNC: 16 U/L (ref 10–40)
BASOPHILS # BLD AUTO: 0.07 K/UL (ref 0–0.2)
BASOPHILS NFR BLD: 1.1 % (ref 0–1.9)
BILIRUB SERPL-MCNC: 1.1 MG/DL (ref 0.1–1)
BUN SERPL-MCNC: 10 MG/DL (ref 6–20)
CALCIUM SERPL-MCNC: 9.3 MG/DL (ref 8.7–10.5)
CHLORIDE SERPL-SCNC: 105 MMOL/L (ref 95–110)
CO2 SERPL-SCNC: 26 MMOL/L (ref 23–29)
CREAT SERPL-MCNC: 1 MG/DL (ref 0.5–1.4)
DIFFERENTIAL METHOD BLD: NORMAL
EOSINOPHIL # BLD AUTO: 0.1 K/UL (ref 0–0.5)
EOSINOPHIL NFR BLD: 1.1 % (ref 0–8)
ERYTHROCYTE [DISTWIDTH] IN BLOOD BY AUTOMATED COUNT: 12.6 % (ref 11.5–14.5)
EST. GFR  (NO RACE VARIABLE): >60 ML/MIN/1.73 M^2
GLUCOSE SERPL-MCNC: 88 MG/DL (ref 70–110)
HAV IGG SER QL IA: REACTIVE
HBV CORE AB SERPL QL IA: NORMAL
HBV SURFACE AG SERPL QL IA: NORMAL
HCT VFR BLD AUTO: 47.4 % (ref 40–54)
HGB BLD-MCNC: 16.3 G/DL (ref 14–18)
IGA SERPL-MCNC: 156 MG/DL (ref 40–350)
IGG SERPL-MCNC: 776 MG/DL (ref 650–1600)
IGM SERPL-MCNC: 37 MG/DL (ref 50–300)
IMM GRANULOCYTES # BLD AUTO: 0.02 K/UL (ref 0–0.04)
IMM GRANULOCYTES NFR BLD AUTO: 0.3 % (ref 0–0.5)
LYMPHOCYTES # BLD AUTO: 2.1 K/UL (ref 1–4.8)
LYMPHOCYTES NFR BLD: 33.5 % (ref 18–48)
MCH RBC QN AUTO: 29 PG (ref 27–31)
MCHC RBC AUTO-ENTMCNC: 34.4 G/DL (ref 32–36)
MCV RBC AUTO: 84 FL (ref 82–98)
MONOCYTES # BLD AUTO: 0.6 K/UL (ref 0.3–1)
MONOCYTES NFR BLD: 9.1 % (ref 4–15)
NEUTROPHILS # BLD AUTO: 3.4 K/UL (ref 1.8–7.7)
NEUTROPHILS NFR BLD: 54.9 % (ref 38–73)
NRBC BLD-RTO: 0 /100 WBC
PLATELET # BLD AUTO: 232 K/UL (ref 150–450)
PMV BLD AUTO: 10 FL (ref 9.2–12.9)
POTASSIUM SERPL-SCNC: 4.2 MMOL/L (ref 3.5–5.1)
PROT SERPL-MCNC: 6.8 G/DL (ref 6–8.4)
RBC # BLD AUTO: 5.62 M/UL (ref 4.6–6.2)
SODIUM SERPL-SCNC: 141 MMOL/L (ref 136–145)
TREPONEMA PALLIDUM IGG+IGM AB [PRESENCE] IN SERUM OR PLASMA BY IMMUNOASSAY: NONREACTIVE
WBC # BLD AUTO: 6.24 K/UL (ref 3.9–12.7)

## 2024-12-16 PROCEDURE — 80053 COMPREHEN METABOLIC PANEL: CPT | Performed by: CLINICAL NURSE SPECIALIST

## 2024-12-16 PROCEDURE — 86790 VIRUS ANTIBODY NOS: CPT | Performed by: CLINICAL NURSE SPECIALIST

## 2024-12-16 PROCEDURE — 86360 T CELL ABSOLUTE COUNT/RATIO: CPT | Performed by: CLINICAL NURSE SPECIALIST

## 2024-12-16 PROCEDURE — 86593 SYPHILIS TEST NON-TREP QUANT: CPT | Performed by: CLINICAL NURSE SPECIALIST

## 2024-12-16 PROCEDURE — 87340 HEPATITIS B SURFACE AG IA: CPT | Performed by: CLINICAL NURSE SPECIALIST

## 2024-12-16 PROCEDURE — 82784 ASSAY IGA/IGD/IGG/IGM EACH: CPT | Mod: 59 | Performed by: CLINICAL NURSE SPECIALIST

## 2024-12-16 PROCEDURE — 86359 T CELLS TOTAL COUNT: CPT | Performed by: CLINICAL NURSE SPECIALIST

## 2024-12-16 PROCEDURE — 86704 HEP B CORE ANTIBODY TOTAL: CPT | Performed by: CLINICAL NURSE SPECIALIST

## 2024-12-16 PROCEDURE — 85025 COMPLETE CBC W/AUTO DIFF WBC: CPT | Performed by: CLINICAL NURSE SPECIALIST

## 2024-12-16 PROCEDURE — 86682 HELMINTH ANTIBODY: CPT | Performed by: CLINICAL NURSE SPECIALIST

## 2024-12-16 PROCEDURE — 36415 COLL VENOUS BLD VENIPUNCTURE: CPT | Mod: PO | Performed by: CLINICAL NURSE SPECIALIST

## 2024-12-16 PROCEDURE — 86711 JOHN CUNNINGHAM ANTIBODY: CPT | Performed by: CLINICAL NURSE SPECIALIST

## 2024-12-16 PROCEDURE — 86787 VARICELLA-ZOSTER ANTIBODY: CPT | Performed by: CLINICAL NURSE SPECIALIST

## 2024-12-17 LAB
ABSOLUTE CD3: 1825 CELLS/UL (ref 700–2100)
ABSOLUTE CD8: 646 CELLS/UL (ref 200–900)
CD3%: 76.3 % (ref 55–83)
CD3+CD4+ CELLS # BLD: 1145 CELLS/UL (ref 300–1400)
CD3+CD4+ CELLS NFR BLD: 47.9 % (ref 28–57)
CD4/CD8 RATIO: 1.77 (ref 0.9–3.6)
CD8 % SUPPRESSOR T CELL: 27 % (ref 10–39)
VARICELLA INTERPRETATION: POSITIVE
VARICELLA ZOSTER IGG: 14.9 S/CO

## 2024-12-18 LAB — STRONGYLOIDES ANTIBODY IGG: NEGATIVE

## 2024-12-19 ENCOUNTER — TELEPHONE (OUTPATIENT)
Dept: NEUROLOGY | Facility: CLINIC | Age: 49
End: 2024-12-19
Payer: COMMERCIAL

## 2024-12-19 DIAGNOSIS — G35 MULTIPLE SCLEROSIS: Primary | ICD-10-CM

## 2024-12-19 RX ORDER — OFATUMUMAB 20 MG/.4ML
INJECTION, SOLUTION SUBCUTANEOUS
Qty: 1.2 ML | Refills: 0 | Status: ACTIVE | OUTPATIENT
Start: 2024-12-19

## 2024-12-19 RX ORDER — OFATUMUMAB 20 MG/.4ML
INJECTION, SOLUTION SUBCUTANEOUS
Qty: 0.4 ML | Refills: 4 | Status: ACTIVE | OUTPATIENT
Start: 2024-12-19

## 2024-12-19 NOTE — TELEPHONE ENCOUNTER
----- Message from Katrin Sesay sent at 12/19/2024 11:26 AM CST -----  VZV positive   Low IgM  Negative Strongyloides   Normal CD8   Hep A positive   RPR negative  Hep B negative  Normal CBC         Hep C negative, HIV negative, Hep B Surface Ab positive  (past vaccination) on 11/5/24--done in [facility]; patient showed me results     Ok to proceed with Kesimpta

## 2024-12-19 NOTE — TELEPHONE ENCOUNTER
----- Message from Katrin Sesay sent at 12/9/2024  9:14 AM CST -----  DL, we are going to start Kesimpta. His labs will be next Monday. He just did HIV, hep B Surface Ab, and hep C in [facility], and these were negative (he showed me the results). He's an [employer] employee.

## 2024-12-19 NOTE — TELEPHONE ENCOUNTER
Armandsimpta new start    Labs:  12/16/24  WBC 6.24  ALC 2090  AST 16, ALT 16  IgG  776  IgM  37  IgA 156  HBsAg - anti-Hbc -, no current or past infection   Hep A IgG +, immune  Varicella IgG +, immune  Strongyloides -  TB gold -    HIV, HEP C negative per AP - done at [facility]    Vaccines:  Influenza:  10/29/2024  Pneumonia (Prevnar 20): One dose due now  Tetanus (TDAP): 2/25/2017, due 2/25/2027  Hepatitis B (Heplisav-B):  Immune per labs done at [facility]  Hepatitis A (Havrix):  Immune per labs on 12/16/24  Shingles (Shingrix): One dose due now, then another dose due in 2 months  Covid:  One dose due now  Respiratory Syncytial Virus (Arexvy): Not a candidate at this time    Start Form:   Completed via [facility] on 12/19    [place]:    Start Kesimpta after last dose of Copaxone

## 2024-12-20 RX ORDER — PNEUMOCOCCAL 20-VALENT CONJUGATE VACCINE 2.2; 2.2; 2.2; 2.2; 2.2; 2.2; 2.2; 2.2; 2.2; 2.2; 2.2; 2.2; 2.2; 2.2; 2.2; 2.2; 4.4; 2.2; 2.2; 2.2 UG/.5ML; UG/.5ML; UG/.5ML; UG/.5ML; UG/.5ML; UG/.5ML; UG/.5ML; UG/.5ML; UG/.5ML; UG/.5ML; UG/.5ML; UG/.5ML; UG/.5ML; UG/.5ML; UG/.5ML; UG/.5ML; UG/.5ML; UG/.5ML; UG/.5ML; UG/.5ML
0.5 INJECTION, SUSPENSION INTRAMUSCULAR ONCE
Qty: 0.5 ML | Refills: 0 | Status: ACTIVE | OUTPATIENT
Start: 2024-12-20 | End: 2024-12-20

## 2024-12-20 RX ORDER — ZOSTER VACCINE RECOMBINANT, ADJUVANTED 50 MCG/0.5
KIT INTRAMUSCULAR
Qty: 1 EACH | Refills: 1 | Status: ACTIVE | OUTPATIENT
Start: 2024-12-20

## 2024-12-27 ENCOUNTER — PATIENT MESSAGE (OUTPATIENT)
Dept: NEUROLOGY | Facility: CLINIC | Age: 49
End: 2024-12-27
Payer: COMMERCIAL

## 2025-01-07 ENCOUNTER — PATIENT MESSAGE (OUTPATIENT)
Dept: NEUROLOGY | Facility: CLINIC | Age: 50
End: 2025-01-07
Payer: COMMERCIAL

## 2025-02-05 NOTE — TELEPHONE ENCOUNTER
----- Message from Katrin Sesay, RUSSEL, CNS sent at 2/3/2022  9:36 AM CST -----  F/U with DESTINY in June   
Spontaneous, unlabored and symmetrical

## 2025-02-19 ENCOUNTER — OCCUPATIONAL HEALTH (OUTPATIENT)
Dept: URGENT CARE | Facility: CLINIC | Age: 50
End: 2025-02-19

## 2025-02-19 DIAGNOSIS — Z13.9 ENCOUNTER FOR SCREENING: Primary | ICD-10-CM

## 2025-02-24 ENCOUNTER — PATIENT MESSAGE (OUTPATIENT)
Dept: ADMINISTRATIVE | Facility: OTHER | Age: 50
End: 2025-02-24
Payer: COMMERCIAL

## 2025-03-07 ENCOUNTER — PATIENT MESSAGE (OUTPATIENT)
Dept: PSYCHIATRY | Facility: CLINIC | Age: 50
End: 2025-03-07
Payer: COMMERCIAL

## 2025-03-10 ENCOUNTER — OFFICE VISIT (OUTPATIENT)
Dept: NEUROLOGY | Facility: CLINIC | Age: 50
End: 2025-03-10
Payer: COMMERCIAL

## 2025-03-10 VITALS
DIASTOLIC BLOOD PRESSURE: 80 MMHG | HEIGHT: 70 IN | BODY MASS INDEX: 26.88 KG/M2 | WEIGHT: 187.75 LBS | SYSTOLIC BLOOD PRESSURE: 133 MMHG

## 2025-03-10 DIAGNOSIS — E55.9 VITAMIN D DEFICIENCY: ICD-10-CM

## 2025-03-10 DIAGNOSIS — G35 MS (MULTIPLE SCLEROSIS): Primary | ICD-10-CM

## 2025-03-10 DIAGNOSIS — Z29.89 PROPHYLACTIC IMMUNOTHERAPY: ICD-10-CM

## 2025-03-10 DIAGNOSIS — Z29.89 IMMUNOTHERAPY: ICD-10-CM

## 2025-03-10 DIAGNOSIS — Z71.89 COUNSELING REGARDING GOALS OF CARE: ICD-10-CM

## 2025-03-10 DIAGNOSIS — D84.9 IMMUNOSUPPRESSION: ICD-10-CM

## 2025-03-10 PROCEDURE — 3008F BODY MASS INDEX DOCD: CPT | Mod: CPTII,S$GLB,, | Performed by: PSYCHIATRY & NEUROLOGY

## 2025-03-10 PROCEDURE — 1159F MED LIST DOCD IN RCRD: CPT | Mod: CPTII,S$GLB,, | Performed by: PSYCHIATRY & NEUROLOGY

## 2025-03-10 PROCEDURE — 3079F DIAST BP 80-89 MM HG: CPT | Mod: CPTII,S$GLB,, | Performed by: PSYCHIATRY & NEUROLOGY

## 2025-03-10 PROCEDURE — G2211 COMPLEX E/M VISIT ADD ON: HCPCS | Mod: S$GLB,,, | Performed by: PSYCHIATRY & NEUROLOGY

## 2025-03-10 PROCEDURE — 1160F RVW MEDS BY RX/DR IN RCRD: CPT | Mod: CPTII,S$GLB,, | Performed by: PSYCHIATRY & NEUROLOGY

## 2025-03-10 PROCEDURE — 3075F SYST BP GE 130 - 139MM HG: CPT | Mod: CPTII,S$GLB,, | Performed by: PSYCHIATRY & NEUROLOGY

## 2025-03-10 PROCEDURE — 99214 OFFICE O/P EST MOD 30 MIN: CPT | Mod: S$GLB,,, | Performed by: PSYCHIATRY & NEUROLOGY

## 2025-03-10 PROCEDURE — 99999 PR PBB SHADOW E&M-EST. PATIENT-LVL III: CPT | Mod: PBBFAC,,, | Performed by: PSYCHIATRY & NEUROLOGY

## 2025-03-10 NOTE — PROGRESS NOTES
Subjective:          Patient ID: Dory Raza is a 49 y.o. male who presents today for a routine clinic visit for MS.      MS HPI:  DMT: Kesimpta   Side effects from DMT? No  Taking vitamin D3 as recommended? Yes - 5,000  Tolerating Kesimpta; Started in January - did have fever with first injection; no side effects since;  Patient denies frequent, severe or unusual infections.  Feeling neurologically stable; no sense of relapse or progressive decline.     Medications:  Current Outpatient Medications   Medication Sig    cholecalciferol, vitamin D3, (VITAMIN D3) 50 mcg (2,000 unit) Cap Take 1 capsule by mouth once daily. Take 6000 IU daily.    ofatumumab (KESIMPTA PEN) 20 mg/0.4 mL PnIj Inject 20 mg (1 pen) into the skin on week 0, 1, and 2.    ofatumumab (KESIMPTA PEN) 20 mg/0.4 mL PnIj Inject 20 mg (1 pen) into the skin every 28 days starting at week 4    tadalafiL (CIALIS) 20 MG Tab Take 1 tablet (20 mg total) by mouth daily as needed (ED).    varicella-zoster [id number], PF, (SHINGRIX, PF,) 50 mcg/0.5 mL injection Inject 0.5 mL into the muscle at month 0, then inject 0.5 mL into the muscle 2 months later    lisdexamfetamine (VYVANSE) 40 MG Cap Take 1 capsule (40 mg total) by mouth every morning.     No current facility-administered medications for this visit.       SOCIAL HISTORY  Social History[1]    Living arrangements - the patient lives with their family.  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         3/7/2025    10:26 AM   REVIEW OF SYMPTOMS   Do you feel abnormally tired on most days? No   Do you feel you generally sleep well? Yes   Do you have difficulty controlling your bladder?  No   Do you have difficulty controlling your bowels?  No   Do you have frequent muscle cramps, tightness or spasms in your limbs?  No   Do you have new visual symptoms?  No   Do you have worsening difficulty with your memory or thinking? No   Do you have worsening symptoms of anxiety or  depression?  No   For patients who walk, Do you have more difficulty walking?  No   Have you fallen since your last visit?  No   For patients who use wheelchairs: Do you have any skin wounds or breakdown? Not Applicable   Do you have difficulty using your hands?  No   Do you have shooting or burning pain? No   Do you have difficulty with sexual function?  Yes   If you are sexually active, are you using birth control? Y/N  N/A No   Do you often choke when swallowing liquids or solid food?  No   Do you experience worsening symptoms when overheated? No   Do you need any new equipment such as a wheelchair, walker or shower chair? No   Do you receive co-pay financial assistance for your principal MS medicine? Yes   Would you be interested in participating in an MS research trial in the future? Yes   For patients on Gilenya, Tecfidera, Aubagio, Rituxan, Ocrevus, Tysabri, Lemtrada or Methotrexate, are you aware that you should NOT receive live virus vaccines?  Not Applicable   Do you feel you have adequate family/friend support?  Yes   Do you have health insurance?   Yes   Are you currently employed? Yes   Do you receive SSDI/SSI?  Not Applicable   Do you use marijuana or cannabis products? No   Have you been diagnosed with a urinary tract infection since your last visit here? No   Have you been diagnosed with a respiratory tract infection since your last visit here? No   Have you been to the emergency room since your last visit here? No   Have you been hospitalized since your last visit here?  No         3/7/2025    10:30 AM   FSS SCORE & INTERPRETATION   FSS SCORE  9    FSS SCORE INTERPRETATION May not be suffering from fatigue        Patient-reported         3/7/2025    10:28 AM   MS ZOEY-D SCORE & INTERPRETATION   ZOEY-D SCORE  2    ZOEY-D INTERPRETATION  No indication of Depression        Patient-reported         3/7/2025    10:27 AM   MS OG-7 SCORE & INTERPRETATION   OG-7 SCORE  0    OG-7 SCORE INTERPRETATION Normal         Patient-reported         3/7/2025    10:31 AM   PEQ MS MOS PAIN EFFECTS SCORE & INTERPRETATION   PES SCORE 6    PES SCORE INTERPRETATION Scores can range from 6-30.  Items are scaled so that higher scores indicate a greater impact of pain on a patients mood and behavior.        Patient-reported         3/7/2025    10:32 AM   PEQ MS SEXUAL SATISFACTION SCORE & INTERPRETATION   SSS SCORE  15    SSS SCORE INTERPRETATION Scores can range from 4-24.  Higher scores indicate greater problems with sexual satisfaction.        Patient-reported         3/7/2025    10:32 AM   MS BLADDER CONTROL SCORE & INTERPRETATION   BLCS SCORE 0    BLCS SCORE INTERPRETATION  Scores can range from 0-22, with higher scores indicating greater bladder control problems.        Patient-reported         3/7/2025    10:35 AM   MS BOWEL CONTROL SCORE & INTERPRETATION   BWCS SCORE 0    BWCS SCORE INTERPRETATION Scores can range from 0-26, with higher scores indicating greater bowel control problems.        Patient-reported         3/7/2025    10:33 AM   PEQ MS IMPACT OF VISUAL IMPAIRMENT SCORE & INTERPRETATION   GRGEORIO SCALE SCORE  0    GREGORIO SCORE INTERPRETATION Scores can range from 0-15, with higher scores indicating greater impact of visual problems on daily activites.        Patient-reported         3/7/2025    10:35 AM   MS PDQ SCORE & INTERPRETATION   PDQ RETROSPECTIVE MEMORY SUBSCALE 1    PDQ ATTENTION/CONCENTRATION SUBSCALE 3    PDQ PROSPECTIVE MEMORY SUBSCALE 3    PDQ PLANNING/ORGANIZATION SUBSCALE 2    PDQ TOTAL SCORE 9    PDQ SCORE INTERPRETATION Scores can range from 0-80, with higher scores indicating greater perceived cognitive impairment.        Patient-reported         3/7/2025    10:37 AM   MSSS SCORE & INTERPRETATION   MSSS TANGIBLE SUPPORT SUBSCALE 75    MSSS EMOTIONAL/INFORMATIONAL SUPPORT SUBSCALE 81.25    MSSS AFFECTIONATE SUPPORT SUBSCALE 75    MSSS POSITIVE SOCIAL INTERACTION SUBSCALE 75    MSSS TOTAL SCORE 76.56    MSSS  SCORE INTERPRETATION Scores can range from 0-100, with higher scores indicating greater perceived support.        Patient-reported           Imaging:     Results for orders placed during the hospital encounter of 12/03/24    MRI Brain Demyelinating Without Contrast    Impression  1. There is a mild burden of white matter disease consistent with the provided diagnosis of multiple sclerosis.  There are 2 small regions of FLAIR and T2 hyperintense signal described above which have developed since the prior studies and represent mild interval progression of demyelinating disease but without suspected active demyelination.  Contrast was not administered.  2. There is no hemorrhage, mass or acute infarction.      Electronically signed by: Abdirashid Barnes MD  Date:    12/03/2024  Time:    09:56    Results for orders placed during the hospital encounter of 09/24/20    MRI Cervical Spine Demyelinating Without Contrast    Impression  1. The axial images are motion degraded.  This somewhat limits evaluation of foraminal stenosis.  Otherwise, there is no fracture or malalignment.  There is no spinal stenosis or cord compression.  2. There are stable regions of abnormal signal intensity in the cord.  Again, the largest lesion is seen in the posterior central cord at the level of C3-4.  There is a 2nd punctate focus of abnormal signal in the right posterior cord at the level of inferior C3.  These findings are consistent with the provided diagnosis of multiple sclerosis without obvious interval progression.  3. There is multilevel degenerative change.  There is however no significant spinal stenosis at any level.  4. There is mild-to-moderate left foraminal narrowing at the C3-4 level.  There is stable moderate left foraminal stenosis at the C4-5 level.  5. There may be mild bilateral foraminal narrowing at the C6-7 level.  Please see above discussion.      Electronically signed by: Abdirashid Barnes  MD  Date:    09/24/2020  Time:    10:45    No results found for this or any previous visit.    No results found for this or any previous visit.    No results found for this or any previous visit.    No results found for this or any previous visit.        Labs:     Lab Results   Component Value Date    OBTKJAGX22VK 58 08/24/2023    HGCZFKLG40XP 72 06/06/2022    MCQCMEYI39SM 109 (H) 11/03/2021     Lab Results   Component Value Date    JCVINDEX 1.55 (H) 12/16/2024    JCVANTIBODY POSITIVE (A) 12/16/2024     Lab Results   Component Value Date    XX1HDFBM 76.3 12/16/2024    ABSOLUTECD3 1825 12/16/2024    AM6ZMUXF 27.0 12/16/2024    ABSOLUTECD8 646 12/16/2024    BQ5BRWWI 47.9 12/16/2024    ABSOLUTECD4 1145 12/16/2024    LABCD48 1.77 12/16/2024     Lab Results   Component Value Date    WBC 6.24 12/16/2024    RBC 5.62 12/16/2024    HGB 16.3 12/16/2024    HCT 47.4 12/16/2024    MCV 84 12/16/2024    MCH 29.0 12/16/2024    MCHC 34.4 12/16/2024    RDW 12.6 12/16/2024     12/16/2024    MPV 10.0 12/16/2024    GRAN 3.4 12/16/2024    GRAN 54.9 12/16/2024    LYMPH 2.1 12/16/2024    LYMPH 33.5 12/16/2024    MONO 0.6 12/16/2024    MONO 9.1 12/16/2024    EOS 0.1 12/16/2024    BASO 0.07 12/16/2024    EOSINOPHIL 1.1 12/16/2024    BASOPHIL 1.1 12/16/2024     Sodium   Date Value Ref Range Status   12/16/2024 141 136 - 145 mmol/L Final     Potassium   Date Value Ref Range Status   12/16/2024 4.2 3.5 - 5.1 mmol/L Final     Chloride   Date Value Ref Range Status   12/16/2024 105 95 - 110 mmol/L Final     CO2   Date Value Ref Range Status   12/16/2024 26 23 - 29 mmol/L Final     Glucose   Date Value Ref Range Status   12/16/2024 88 70 - 110 mg/dL Final     BUN   Date Value Ref Range Status   12/16/2024 10 6 - 20 mg/dL Final     Creatinine   Date Value Ref Range Status   12/16/2024 1.0 0.5 - 1.4 mg/dL Final     Calcium   Date Value Ref Range Status   12/16/2024 9.3 8.7 - 10.5 mg/dL Final     Total Protein   Date Value Ref Range Status    12/16/2024 6.8 6.0 - 8.4 g/dL Final     Albumin   Date Value Ref Range Status   12/16/2024 4.2 3.5 - 5.2 g/dL Final     Total Bilirubin   Date Value Ref Range Status   12/16/2024 1.1 (H) 0.1 - 1.0 mg/dL Final     Comment:     For infants and newborns, interpretation of results should be based  on gestational age, weight and in agreement with clinical  observations.    Premature Infant recommended reference ranges:  Up to 24 hours.............<8.0 mg/dL  Up to 48 hours............<12.0 mg/dL  3-5 days..................<15.0 mg/dL  6-29 days.................<15.0 mg/dL       Alkaline Phosphatase   Date Value Ref Range Status   12/16/2024 81 40 - 150 U/L Final     AST   Date Value Ref Range Status   12/16/2024 16 10 - 40 U/L Final     ALT   Date Value Ref Range Status   12/16/2024 16 10 - 44 U/L Final     Anion Gap   Date Value Ref Range Status   12/16/2024 10 8 - 16 mmol/L Final     eGFR if    Date Value Ref Range Status   11/03/2021 >60.0 >60 mL/min/1.73 m^2 Final     eGFR if non    Date Value Ref Range Status   11/03/2021 >60.0 >60 mL/min/1.73 m^2 Final     Comment:     Calculation used to obtain the estimated glomerular filtration  rate (eGFR) is the CKD-EPI equation.        Lab Results   Component Value Date    HEPBSAG Non-reactive 12/16/2024    HEPBCAB Non-reactive 12/16/2024           MS Impression and Plan:     NEURO MULTIPLE SCLEROSIS IMPRESSION:   Clinical Progression:  Clinically Stable  MRI Progression:  N/A  MS Classification:  Relapsing-Remitting MS  Current DMT: ofatumumab  DMT:  No change in management  Symptom Management:  No change in symptom management  Additional Impressions: Tolerating Kesimpta  6 mo interval MRI in July  Labs in June  F/u Katrin Shama CNS after July MRI            Problem List Items Addressed This Visit          Unprioritized    Prophylactic immunotherapy    Vitamin D deficiency    Relevant Orders    Vitamin D     Other Visit Diagnoses         MS  (multiple sclerosis)    -  Primary    Relevant Orders    MRI Brain Demyelinating W W/O Contrast    CBC Auto Differential    Comprehensive Metabolic Panel    Hepatitis B Core Antibody, Total    Hepatitis B Surface Antigen    Immunoglobulins (IgG, IgA, IgM) Quantitative      Immunotherapy        Relevant Orders    CBC Auto Differential    Comprehensive Metabolic Panel    Hepatitis B Core Antibody, Total    Hepatitis B Surface Antigen    Immunoglobulins (IgG, IgA, IgM) Quantitative      Counseling regarding goals of care          Immunosuppression                Ale Hernandez MD    I spent a total of 30 minutes on the day of the visit.This includes face to face time and non-face to face time preparing to see the patient (eg, review of tests), obtaining and/or reviewing separately obtained history, documenting clinical information in the electronic or other health record, independently interpreting results and communicating results to the patient/family/caregiver, or care coordinator.  Visit today included increased complexity associated with the care of the episodic problem : chronic immunotherapy; addressed and managing the longitudinal care of the patient due to the serious and/or complex managed problem(s) MS.           [1]   Social History  Tobacco Use    Smoking status: Former     Current packs/day: 0.00     Average packs/day: 1 pack/day for 10.0 years (10.0 ttl pk-yrs)     Types: Cigarettes     Quit date: 2/18/2010     Years since quitting: 15.0    Smokeless tobacco: Never   Substance Use Topics    Alcohol use: Yes     Comment: socially    Drug use: No

## 2025-03-22 ENCOUNTER — PATIENT MESSAGE (OUTPATIENT)
Dept: ADMINISTRATIVE | Facility: OTHER | Age: 50
End: 2025-03-22
Payer: COMMERCIAL

## 2025-03-22 DIAGNOSIS — G35 MULTIPLE SCLEROSIS: ICD-10-CM

## 2025-03-24 RX ORDER — OFATUMUMAB 20 MG/.4ML
INJECTION, SOLUTION SUBCUTANEOUS
Qty: 1.2 ML | Refills: 0 | OUTPATIENT
Start: 2025-03-24

## 2025-04-14 ENCOUNTER — PATIENT MESSAGE (OUTPATIENT)
Dept: ADMINISTRATIVE | Facility: OTHER | Age: 50
End: 2025-04-14
Payer: COMMERCIAL

## 2025-05-13 ENCOUNTER — PATIENT MESSAGE (OUTPATIENT)
Dept: PSYCHIATRY | Facility: CLINIC | Age: 50
End: 2025-05-13
Payer: COMMERCIAL

## 2025-05-13 ENCOUNTER — PATIENT MESSAGE (OUTPATIENT)
Dept: ADMINISTRATIVE | Facility: OTHER | Age: 50
End: 2025-05-13
Payer: COMMERCIAL

## 2025-06-03 ENCOUNTER — LAB VISIT (OUTPATIENT)
Dept: LAB | Facility: HOSPITAL | Age: 50
End: 2025-06-03
Attending: PSYCHIATRY & NEUROLOGY
Payer: COMMERCIAL

## 2025-06-03 DIAGNOSIS — G35 MS (MULTIPLE SCLEROSIS): ICD-10-CM

## 2025-06-03 DIAGNOSIS — E55.9 VITAMIN D DEFICIENCY: ICD-10-CM

## 2025-06-03 DIAGNOSIS — Z29.89 IMMUNOTHERAPY: ICD-10-CM

## 2025-06-03 LAB
25(OH)D3+25(OH)D2 SERPL-MCNC: 59 NG/ML (ref 30–96)
ABSOLUTE EOSINOPHIL (OHS): 0.05 K/UL
ABSOLUTE MONOCYTE (OHS): 0.52 K/UL (ref 0.3–1)
ABSOLUTE NEUTROPHIL COUNT (OHS): 4.79 K/UL (ref 1.8–7.7)
ALBUMIN SERPL BCP-MCNC: 4.5 G/DL (ref 3.5–5.2)
ALP SERPL-CCNC: 73 UNIT/L (ref 40–150)
ALT SERPL W/O P-5'-P-CCNC: 13 UNIT/L (ref 10–44)
ANION GAP (OHS): 8 MMOL/L (ref 8–16)
AST SERPL-CCNC: 13 UNIT/L (ref 11–45)
BASOPHILS # BLD AUTO: 0.06 K/UL
BASOPHILS NFR BLD AUTO: 0.9 %
BILIRUB SERPL-MCNC: 0.8 MG/DL (ref 0.1–1)
BUN SERPL-MCNC: 11 MG/DL (ref 6–20)
CALCIUM SERPL-MCNC: 9.5 MG/DL (ref 8.7–10.5)
CHLORIDE SERPL-SCNC: 105 MMOL/L (ref 95–110)
CO2 SERPL-SCNC: 28 MMOL/L (ref 23–29)
CREAT SERPL-MCNC: 1 MG/DL (ref 0.5–1.4)
ERYTHROCYTE [DISTWIDTH] IN BLOOD BY AUTOMATED COUNT: 12.3 % (ref 11.5–14.5)
GFR SERPLBLD CREATININE-BSD FMLA CKD-EPI: >60 ML/MIN/1.73/M2
GLUCOSE SERPL-MCNC: 95 MG/DL (ref 70–110)
HBV CORE AB SERPL QL IA: NORMAL
HBV SURFACE AG SERPL QL IA: NORMAL
HCT VFR BLD AUTO: 50.7 % (ref 40–54)
HGB BLD-MCNC: 16.6 GM/DL (ref 14–18)
IGA SERPL-MCNC: 165 MG/DL (ref 40–350)
IGG SERPL-MCNC: 737 MG/DL (ref 650–1600)
IGM SERPL-MCNC: 28 MG/DL (ref 50–300)
IMM GRANULOCYTES # BLD AUTO: 0.03 K/UL (ref 0–0.04)
IMM GRANULOCYTES NFR BLD AUTO: 0.4 % (ref 0–0.5)
LYMPHOCYTES # BLD AUTO: 1.57 K/UL (ref 1–4.8)
MCH RBC QN AUTO: 27.8 PG (ref 27–31)
MCHC RBC AUTO-ENTMCNC: 32.7 G/DL (ref 32–36)
MCV RBC AUTO: 85 FL (ref 82–98)
NUCLEATED RBC (/100WBC) (OHS): 0 /100 WBC
PLATELET # BLD AUTO: 289 K/UL (ref 150–450)
PMV BLD AUTO: 9.8 FL (ref 9.2–12.9)
POTASSIUM SERPL-SCNC: 4.7 MMOL/L (ref 3.5–5.1)
PROT SERPL-MCNC: 6.7 GM/DL (ref 6–8.4)
RBC # BLD AUTO: 5.97 M/UL (ref 4.6–6.2)
RELATIVE EOSINOPHIL (OHS): 0.7 %
RELATIVE LYMPHOCYTE (OHS): 22.4 % (ref 18–48)
RELATIVE MONOCYTE (OHS): 7.4 % (ref 4–15)
RELATIVE NEUTROPHIL (OHS): 68.2 % (ref 38–73)
SODIUM SERPL-SCNC: 141 MMOL/L (ref 136–145)
WBC # BLD AUTO: 7.02 K/UL (ref 3.9–12.7)

## 2025-06-03 PROCEDURE — 85025 COMPLETE CBC W/AUTO DIFF WBC: CPT

## 2025-06-03 PROCEDURE — 87340 HEPATITIS B SURFACE AG IA: CPT

## 2025-06-03 PROCEDURE — 36415 COLL VENOUS BLD VENIPUNCTURE: CPT | Mod: PO

## 2025-06-03 PROCEDURE — 82306 VITAMIN D 25 HYDROXY: CPT

## 2025-06-03 PROCEDURE — 82784 ASSAY IGA/IGD/IGG/IGM EACH: CPT | Mod: 59

## 2025-06-03 PROCEDURE — 80053 COMPREHEN METABOLIC PANEL: CPT

## 2025-06-03 PROCEDURE — 86704 HEP B CORE ANTIBODY TOTAL: CPT

## 2025-06-05 ENCOUNTER — RESULTS FOLLOW-UP (OUTPATIENT)
Dept: NEUROLOGY | Facility: CLINIC | Age: 50
End: 2025-06-05

## 2025-06-09 DIAGNOSIS — G35 MULTIPLE SCLEROSIS: ICD-10-CM

## 2025-06-10 RX ORDER — OFATUMUMAB 20 MG/.4ML
INJECTION, SOLUTION SUBCUTANEOUS
Qty: 1.2 ML | Refills: 1 | Status: ACTIVE | OUTPATIENT
Start: 2025-06-10

## 2025-06-10 NOTE — TELEPHONE ENCOUNTER
Labs   6/3/25  WBC 7.02  ALC 1572  AST 13, ALT 13  IgG 737 IgM 28 IgA 165  HBsAg - anti-Hbc -  , no current or past infection

## 2025-06-11 ENCOUNTER — OFFICE VISIT (OUTPATIENT)
Dept: DERMATOLOGY | Facility: CLINIC | Age: 50
End: 2025-06-11
Payer: COMMERCIAL

## 2025-06-11 DIAGNOSIS — D22.9 MULTIPLE BENIGN NEVI: ICD-10-CM

## 2025-06-11 DIAGNOSIS — L91.8 SKIN TAG: ICD-10-CM

## 2025-06-11 DIAGNOSIS — L57.0 AK (ACTINIC KERATOSIS): ICD-10-CM

## 2025-06-11 DIAGNOSIS — Z12.83 SCREENING EXAM FOR SKIN CANCER: Primary | ICD-10-CM

## 2025-06-11 DIAGNOSIS — L82.1 SK (SEBORRHEIC KERATOSIS): ICD-10-CM

## 2025-06-11 DIAGNOSIS — D18.01 CHERRY ANGIOMA: ICD-10-CM

## 2025-06-11 PROCEDURE — 99203 OFFICE O/P NEW LOW 30 MIN: CPT | Mod: 25,S$GLB,, | Performed by: DERMATOLOGY

## 2025-06-11 PROCEDURE — 99999 PR PBB SHADOW E&M-EST. PATIENT-LVL II: CPT | Mod: PBBFAC,,, | Performed by: DERMATOLOGY

## 2025-06-11 PROCEDURE — 1160F RVW MEDS BY RX/DR IN RCRD: CPT | Mod: CPTII,S$GLB,, | Performed by: DERMATOLOGY

## 2025-06-11 PROCEDURE — 17000 DESTRUCT PREMALG LESION: CPT | Mod: S$GLB,,, | Performed by: DERMATOLOGY

## 2025-06-11 PROCEDURE — 1159F MED LIST DOCD IN RCRD: CPT | Mod: CPTII,S$GLB,, | Performed by: DERMATOLOGY

## 2025-06-11 NOTE — PROGRESS NOTES
Subjective:      Patient ID:  Dory Raza is a 50 y.o. male who presents for   Chief Complaint   Patient presents with    Skin Check     HPI  Dory Raza is a 50 y.o. male who presents for: FBSE screening exam for skin cancer.    New patient    The patient has the following lesions of concern:  Location: R neck and L temple  Duration: several months  Symptoms: itching  Relieving factors/Previous treatments: none    Pertinent history:  History of blistering sunburns: Yes  History of tanning bed use: No  Family history of melanoma: No  Personal history of mole removal: No  Personal history of skin cancer: No      Review of Systems   Skin:  Positive for activity-related sunscreen use and wears hat. Negative for daily sunscreen use.   Hematologic/Lymphatic: Does not bruise/bleed easily.       Objective:   Physical Exam   Constitutional: He appears well-developed and well-nourished. No distress.   Neurological: He is alert and oriented to person, place, and time. He is not disoriented.   Psychiatric: He has a normal mood and affect.   Skin:   Areas Examined (abnormalities noted in diagram):   Scalp / Hair Palpated and Inspected  Head / Face Inspection Performed  Neck Inspection Performed  Chest / Axilla Inspection Performed  Abdomen Inspection Performed  Genitals / Buttocks / Groin Inspection Performed  Back Inspection Performed  RUE Inspected  LUE Inspection Performed  RLE Inspected  LLE Inspection Performed  Nails and Digits Inspection Performed                 Diagram Legend     Erythematous scaling macule/papule c/w actinic keratosis       Vascular papule c/w angioma      Pigmented verrucoid papule/plaque c/w seborrheic keratosis      Yellow umbilicated papule c/w sebaceous hyperplasia      Irregularly shaped tan macule c/w lentigo     1-2 mm smooth white papules consistent with Milia      Movable subcutaneous cyst with punctum c/w epidermal inclusion cyst      Subcutaneous movable cyst c/w pilar cyst      Firm  pink to brown papule c/w dermatofibroma      Pedunculated fleshy papule(s) c/w skin tag(s)      Evenly pigmented macule c/w junctional nevus     Mildly variegated pigmented, slightly irregular-bordered macule c/w mildly atypical nevus      Flesh colored to evenly pigmented papule c/w intradermal nevus       Pink pearly papule/plaque c/w basal cell carcinoma      Erythematous hyperkeratotic cursted plaque c/w SCC      Surgical scar with no sign of skin cancer recurrence      Open and closed comedones      Inflammatory papules and pustules      Verrucoid papule consistent consistent with wart     Erythematous eczematous patches and plaques     Dystrophic onycholytic nail with subungual debris c/w onychomycosis     Umbilicated papule    Erythematous-base heme-crusted tan verrucoid plaque consistent with inflamed seborrheic keratosis     Erythematous Silvery Scaling Plaque c/w Psoriasis     See annotation      Assessment / Plan:        Screening exam for skin cancer  Total body skin examination performed today including at least 12 points as noted in physical examination. No lesions suspicious for malignancy noted.    Recommend daily sun protection/avoidance, use of at least SPF 30, broad spectrum sunscreen (OTC drug), skin self examinations, and routine physician surveillance to optimize early detection    AK (actinic keratosis)  Cryosurgery Procedure Note    Verbal consent from the patient is obtained including, but not limited to, risk of hypopigmentation/hyperpigmentation, scar, recurrence of lesion. The patient is aware of the precancerous quality and need for treatment of these lesions. Liquid nitrogen cryosurgery is applied to the 1 actinic keratoses, as detailed in the physical exam, to produce a freeze injury. The patient is aware that blisters may form and is instructed on wound care with gentle cleansing and use of vaseline ointment to keep moist until healed. The patient is supplied a handout on cryosurgery  and is instructed to call if lesions do not completely resolve.      These results are benign and require no further treatment.  Please contact us should you have any questions.  Multiple benign nevi  Skin tag  SK (seborrheic keratosis)  Cherry angioma    Reviewed cosmetic pricing for benign growths         No follow-ups on file.1 yr

## 2025-06-19 ENCOUNTER — PATIENT MESSAGE (OUTPATIENT)
Dept: PSYCHIATRY | Facility: CLINIC | Age: 50
End: 2025-06-19
Payer: COMMERCIAL

## 2025-07-02 ENCOUNTER — HOSPITAL ENCOUNTER (OUTPATIENT)
Dept: RADIOLOGY | Facility: HOSPITAL | Age: 50
Discharge: HOME | End: 2025-07-02
Attending: PSYCHIATRY & NEUROLOGY
Payer: COMMERCIAL

## 2025-07-02 DIAGNOSIS — G35 MS (MULTIPLE SCLEROSIS): ICD-10-CM

## 2025-07-02 PROCEDURE — A9585 GADOBUTROL INJECTION: HCPCS | Mod: PO | Performed by: PSYCHIATRY & NEUROLOGY

## 2025-07-02 PROCEDURE — 25500020 PHARM REV CODE 255: Mod: PO | Performed by: PSYCHIATRY & NEUROLOGY

## 2025-07-02 PROCEDURE — 70553 MRI BRAIN STEM W/O & W/DYE: CPT | Mod: TC,PO

## 2025-07-02 PROCEDURE — 70553 MRI BRAIN STEM W/O & W/DYE: CPT | Mod: 26,,, | Performed by: RADIOLOGY

## 2025-07-02 RX ORDER — GADOBUTROL 604.72 MG/ML
8 INJECTION INTRAVENOUS
Status: COMPLETED | OUTPATIENT
Start: 2025-07-02 | End: 2025-07-02

## 2025-07-02 RX ADMIN — GADOBUTROL 8 ML: 604.72 INJECTION INTRAVENOUS at 09:07

## 2025-07-10 ENCOUNTER — OFFICE VISIT (OUTPATIENT)
Dept: NEUROLOGY | Facility: CLINIC | Age: 50
End: 2025-07-10
Payer: COMMERCIAL

## 2025-07-10 VITALS
HEIGHT: 70 IN | DIASTOLIC BLOOD PRESSURE: 77 MMHG | SYSTOLIC BLOOD PRESSURE: 124 MMHG | WEIGHT: 193.81 LBS | BODY MASS INDEX: 27.75 KG/M2

## 2025-07-10 DIAGNOSIS — G35 MULTIPLE SCLEROSIS: Primary | ICD-10-CM

## 2025-07-10 DIAGNOSIS — Z71.89 COUNSELING REGARDING GOALS OF CARE: ICD-10-CM

## 2025-07-10 DIAGNOSIS — Z79.899 HIGH RISK MEDICATION USE: ICD-10-CM

## 2025-07-10 DIAGNOSIS — Z29.89 PROPHYLACTIC IMMUNOTHERAPY: ICD-10-CM

## 2025-07-10 PROCEDURE — 99999 PR PBB SHADOW E&M-EST. PATIENT-LVL III: CPT | Mod: PBBFAC,,, | Performed by: CLINICAL NURSE SPECIALIST

## 2025-07-10 PROCEDURE — 3074F SYST BP LT 130 MM HG: CPT | Mod: CPTII,S$GLB,, | Performed by: CLINICAL NURSE SPECIALIST

## 2025-07-10 PROCEDURE — 3008F BODY MASS INDEX DOCD: CPT | Mod: CPTII,S$GLB,, | Performed by: CLINICAL NURSE SPECIALIST

## 2025-07-10 PROCEDURE — G2211 COMPLEX E/M VISIT ADD ON: HCPCS | Mod: S$GLB,,, | Performed by: CLINICAL NURSE SPECIALIST

## 2025-07-10 PROCEDURE — 1159F MED LIST DOCD IN RCRD: CPT | Mod: CPTII,S$GLB,, | Performed by: CLINICAL NURSE SPECIALIST

## 2025-07-10 PROCEDURE — 3078F DIAST BP <80 MM HG: CPT | Mod: CPTII,S$GLB,, | Performed by: CLINICAL NURSE SPECIALIST

## 2025-07-10 PROCEDURE — 99214 OFFICE O/P EST MOD 30 MIN: CPT | Mod: S$GLB,,, | Performed by: CLINICAL NURSE SPECIALIST

## 2025-07-10 NOTE — PROGRESS NOTES
Subjective:          Patient ID: Dory Raza is a 50 y.o. male who presents today for a routine clinic visit for MS.  He was last seen in March by Dr. Hernandez. The history has been provided by the patient.       NEURO MULTIPLE SCLEROISIS SUMMARY:   Disease type currently:  Relapsing-Remitting MS  Current Therapy:  Ofatumumab       MS HPI:  History of Present Illness    CHIEF COMPLAINT:  Patient presents today for follow up of multiple sclerosis.    MULTIPLE SCLEROSIS STATUS:  He reports feeling well clinically since the last exam. He denies recent infections, illness, pain, muscle spasms, heat sensitivity, or mobility issues. Bowel and bladder function are normal.  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    MEDICATIONS:  He continues Kesimpta for MS treatment, administered once every 28 days, reporting no side effects or injection site reactions. He finds the monthly dosing schedule more convenient and manageable compared to previous Copaxone regimen. He takes Vitamin D 5000 units daily and uses Vyvanse approximately once weekly during demanding work periods.     IMAGING:  Recent MRI demonstrates white matter lesions consistent with Multiple Sclerosis with mild plaque burden. A stable lesion at C3-C4 level was noted, with no new or enhancing lesions identified compared to December MRI which had shown some activity.    LABS:  Vitamin D level is 59, within normal range. Immunoglobulin M levels have dropped, which happens at times with Kesimpta treatment and currently not clinically significant.        DMT: Kesimpta 20mg monthly   Side effects from DMT? No  Taking vitamin D3 as recommended? Yes -  Dose: 5000 units daily   He denies any signs of clinical relapse or new symptoms.   He stays very active at work and walks a lot.   He denies any recent infections.     Medications:  Current Outpatient Medications   Medication Sig    cholecalciferol, vitamin D3, (VITAMIN  D3) 50 mcg (2,000 unit) Cap Take 1 capsule by mouth once daily. Take 6000 IU daily.    ofatumumab (KESIMPTA PEN) 20 mg/0.4 mL PnIj Inject 20 mg (1 pen) into the skin every 28 days starting at week 4    tadalafiL (CIALIS) 20 MG Tab Take 1 tablet (20 mg total) by mouth daily as needed (ED).    varicella-zoster [id number], PF, (SHINGRIX, PF,) 50 mcg/0.5 mL injection Inject 0.5 mL into the muscle at month 0, then inject 0.5 mL into the muscle 2 months later    lisdexamfetamine (VYVANSE) 40 MG Cap Take 1 capsule (40 mg total) by mouth every morning.     No current facility-administered medications for this visit.       SOCIAL HISTORY  Social History[1]    Living arrangements - the patient lives with his family     ROS:      7/3/2025     9:22 AM   REVIEW OF SYMPTOMS   Do you feel abnormally tired on most days? No--Vyvanse as needed    Do you feel you generally sleep well? Yes   Do you have difficulty controlling your bladder?  No   Do you have difficulty controlling your bowels?  No   Do you have frequent muscle cramps, tightness or spasms in your limbs?  No   Do you have new visual symptoms?  No--sees eye doctor annually    Do you have worsening difficulty with your memory or thinking? No   Do you have worsening symptoms of anxiety or depression?  No   For patients who walk, Do you have more difficulty walking?  No   Have you fallen since your last visit?  No   For patients who use wheelchairs: Do you have any skin wounds or breakdown? Not Applicable   Do you have difficulty using your hands?  No   Do you have shooting or burning pain? No   Do you have difficulty with sexual function?  Yes   If you are sexually active, are you using birth control? Y/N  N/A No   Do you often choke when swallowing liquids or solid food?  No   Do you experience worsening symptoms when overheated? No   Do you need any new equipment such as a wheelchair, walker or shower chair? No   Do you receive co-pay financial assistance for your principal  MS medicine? Yes   Would you be interested in participating in an MS research trial in the future? Yes   For patients on Gilenya, Tecfidera, Aubagio, Rituxan, Ocrevus, Tysabri, Lemtrada or Methotrexate, are you aware that you should NOT receive live virus vaccines?  Patient is aware    Do you feel you have adequate family/friend support?  Yes   Do you have health insurance?   Yes   Are you currently employed? Yes   Do you receive SSDI/SSI?  Not Applicable   Do you use marijuana or cannabis products? No   Have you been diagnosed with a urinary tract infection since your last visit here? No   Have you been diagnosed with a respiratory tract infection since your last visit here? No   Have you been to the emergency room since your last visit here? No   Have you been hospitalized since your last visit here?  No              Objective:        1. 25 foot timed walk:      3/10/2025     9:00 AM 7/10/2025     8:30 AM   Timed 25 Foot Walk:   Did patient wear an AFO? No No   Was assistive device used? No No   Time for 25 Foot Walk (seconds)  2.8   Time for 25 Foot Walk (seconds)  3.1       Neurological Exam    Mental Status   Oriented to person, place, and time.   Attention: normal. Concentration: normal.   Speech: speech is normal   Level of consciousness: alert  Knowledge: good.   Normal comprehension.      Cranial Nerves      CN II   Visual acuity: normal with correction     CN III, IV, VI   Extraocular motions are normal.   Nystagmus: none      CN V   Right facial sensation deficit: none  Left facial sensation deficit: none     CN VII   Right facial weakness: none  Left facial weakness: none     CN VIII   Hearing: intact     CN IX, X   Palate: symmetric     CN XI   Right sternocleidomastoid strength: normal  Left sternocleidomastoid strength: normal  Right trapezius strength: normal  Left trapezius strength: normal     CN XII   Tongue deviation: none     Motor Exam   Muscle bulk: normal  Overall muscle tone: normal  Right  arm tone: normal  Left arm tone: normal  Right leg tone: normal  Left leg tone: normal     Strength   Right neck flexion: 5/5  Left neck flexion: 5/5  Right neck extension: 5/5  Left neck extension: 5/5  Right deltoid: 5/5  Left deltoid: 5/5  Right biceps: 5/5  Left biceps: 5/5  Right triceps: 5/5  Left triceps: 5/5  Right wrist flexion: 5/5  Left wrist flexion: 5/5  Right wrist extension: 5/5  Left wrist extension: 5/5  Right interossei: 5/5  Left interossei: 5/5  Right iliopsoas: 5/5  Left iliopsoas: 5/5  Right quadriceps: 5/5  Left quadriceps: 5/5  Right hamstrin/5  Left hamstrin/5  Right anterior tibial: 5/5  Left anterior tibial: 5/5  Right gastroc: 5/5  Left gastroc: 5/5     Sensory Exam   Right arm vibration: normal  Left arm vibration: normal  Right leg vibration: normal  Left leg vibration: normal     Gait, Coordination, and Reflexes      Gait  Gait: normal     Coordination   Romberg: negative  Finger to nose coordination: normal  Heel to shin coordination: normal  Tandem walking coordination: normal     Tremor   Resting tremor: absent     Reflexes   Right brachioradialis: 2+  Left brachioradialis: 2+  Right biceps: 2+  Left biceps: 2+  Right triceps: 2+  Left triceps: 2+  Right patellar: 2+  Left patellar: 2+  Right achilles: 2+  Left achilles: 2+  Right plantar: equivocal  Left plantar: equivocal  He is able to hop on each foot ten times.   Normal rapid sequential movements in upper extremities.    Imaging:       Results for orders placed during the hospital encounter of 25    MRI Brain Demyelinating W W/O Contrast    Impression  1. Redemonstrated stable white matter lesions consistent patient's given history of multiple sclerosis with overall mild plaque burden.  No new or enhancing lesions to suggest recent or active demyelination.  No acute process.      Electronically signed by: Channing Fung  Date:    2025  Time:    10:13    Images were reviewed with the patient.   Labs:     Lab  Results   Component Value Date    DNNFZMAM50AA 59 06/03/2025    DESJZFON90NM 58 08/24/2023    BKXSCNHY84VX 72 06/06/2022     Lab Results   Component Value Date    JCVINDEX 1.55 (H) 12/16/2024    JCVANTIBODY POSITIVE (A) 12/16/2024     Lab Results   Component Value Date    AC0SIUQP 76.3 12/16/2024    ABSOLUTECD3 1825 12/16/2024    SW8UYNJR 27.0 12/16/2024    ABSOLUTECD8 646 12/16/2024    AM6TOMVC 47.9 12/16/2024    ABSOLUTECD4 1145 12/16/2024    LABCD48 1.77 12/16/2024     Lab Results   Component Value Date    WBC 7.02 06/03/2025    RBC 5.97 06/03/2025    HGB 16.6 06/03/2025    HCT 50.7 06/03/2025    MCV 85 06/03/2025    MCH 27.8 06/03/2025    MCHC 32.7 06/03/2025    RDW 12.3 06/03/2025     06/03/2025    MPV 9.8 06/03/2025    GRAN 3.4 12/16/2024    GRAN 54.9 12/16/2024    LYMPH 22.4 06/03/2025    LYMPH 1.57 06/03/2025    MONO 7.4 06/03/2025    MONO 0.52 06/03/2025    EOS 0.7 06/03/2025    EOS 0.05 06/03/2025    BASO 0.07 12/16/2024    EOSINOPHIL 1.1 12/16/2024    BASOPHIL 0.9 06/03/2025    BASOPHIL 0.06 06/03/2025     Sodium   Date Value Ref Range Status   06/03/2025 141 136 - 145 mmol/L Final   12/16/2024 141 136 - 145 mmol/L Final     Potassium   Date Value Ref Range Status   06/03/2025 4.7 3.5 - 5.1 mmol/L Final   12/16/2024 4.2 3.5 - 5.1 mmol/L Final     Chloride   Date Value Ref Range Status   06/03/2025 105 95 - 110 mmol/L Final   12/16/2024 105 95 - 110 mmol/L Final     CO2   Date Value Ref Range Status   06/03/2025 28 23 - 29 mmol/L Final   12/16/2024 26 23 - 29 mmol/L Final     Glucose   Date Value Ref Range Status   06/03/2025 95 70 - 110 mg/dL Final   12/16/2024 88 70 - 110 mg/dL Final     BUN   Date Value Ref Range Status   06/03/2025 11 6 - 20 mg/dL Final     Creatinine   Date Value Ref Range Status   06/03/2025 1.0 0.5 - 1.4 mg/dL Final     Calcium   Date Value Ref Range Status   06/03/2025 9.5 8.7 - 10.5 mg/dL Final   12/16/2024 9.3 8.7 - 10.5 mg/dL Final     Protein Total   Date Value Ref Range  Status   06/03/2025 6.7 6.0 - 8.4 gm/dL Final     Total Protein   Date Value Ref Range Status   12/16/2024 6.8 6.0 - 8.4 g/dL Final     Albumin   Date Value Ref Range Status   06/03/2025 4.5 3.5 - 5.2 g/dL Final   12/16/2024 4.2 3.5 - 5.2 g/dL Final     Total Bilirubin   Date Value Ref Range Status   12/16/2024 1.1 (H) 0.1 - 1.0 mg/dL Final     Comment:     For infants and newborns, interpretation of results should be based  on gestational age, weight and in agreement with clinical  observations.    Premature Infant recommended reference ranges:  Up to 24 hours.............<8.0 mg/dL  Up to 48 hours............<12.0 mg/dL  3-5 days..................<15.0 mg/dL  6-29 days.................<15.0 mg/dL       Bilirubin Total   Date Value Ref Range Status   06/03/2025 0.8 0.1 - 1.0 mg/dL Final     Comment:     For infants and newborns, interpretation of results should be based   on gestational age, weight and in agreement with clinical   observations.    Premature Infant recommended reference ranges:   0-24 hours:  <8.0 mg/dL   24-48 hours: <12.0 mg/dL   3-5 days:    <15.0 mg/dL   6-29 days:   <15.0 mg/dL     Alkaline Phosphatase   Date Value Ref Range Status   12/16/2024 81 40 - 150 U/L Final     ALP   Date Value Ref Range Status   06/03/2025 73 40 - 150 unit/L Final     AST   Date Value Ref Range Status   06/03/2025 13 11 - 45 unit/L Final   12/16/2024 16 10 - 40 U/L Final     ALT   Date Value Ref Range Status   06/03/2025 13 10 - 44 unit/L Final   12/16/2024 16 10 - 44 U/L Final     Anion Gap   Date Value Ref Range Status   06/03/2025 8 8 - 16 mmol/L Final     eGFR if    Date Value Ref Range Status   11/03/2021 >60.0 >60 mL/min/1.73 m^2 Final     eGFR if non    Date Value Ref Range Status   11/03/2021 >60.0 >60 mL/min/1.73 m^2 Final     Comment:     Calculation used to obtain the estimated glomerular filtration  rate (eGFR) is the CKD-EPI equation.        Lab Results   Component Value  Date    HEPBSAG Non-Reactive 06/03/2025    HEPBCAB Non-Reactive 06/03/2025           MS Impression and Plan:     NEURO MULTIPLE SCLEROSIS IMPRESSION:   Number of relapses in the past year?:  0  Clinical Progression:  Clinically Stable  MRI Progression:  Stable  MS Classification:  Relapsing-Remitting MS  Current DMT: ofatumumab  Current DMT comment: Continue Kesimpta and Vitamin D. Next safety labs are due in December. He is aware of the risks associated with immunosuppressant therapy, including increased risk of infection.     DMT:  No change in management  Symptom Management:  No change in symptom management      Assessment & Plan      MULTIPLE SCLEROSIS:  - Reviewed MRI results from 7/2, noting stable white matter lesions consistent with MS and mild plaque burden.  - Compared current MRI to December scan, confirming no new lesions identified.  - Assessed clinical status, finding stable with no signs of relapse or new symptoms.  - Performed neurological exam, including gait assessment, vision test, and motor function evaluation.  - Continued Kesimpta every 28 days. Next safety labs are due in December.     VITAMIN D MANAGEMENT:  - Continued Vitamin D 5000 IU daily.    OTHER MEDICATIONS:  - Continued Vyvanse as needed.    FOLLOW-UP PLAN:  - Ordered labs for December.  He will follow up with Dr. Hernandez in 6 months.   The visit today is associated with current or anticipated ongoing medical care related to this patient's single serious condition/complex condition of multiple sclerosis.    Total time spent with patient: 25 minutes  Total time spent on encounter: 35 minutes       Problem List Items Addressed This Visit          Neurologic Problems    Multiple sclerosis - Primary    Relevant Orders    CBC Auto Differential    Comprehensive Metabolic Panel    Hepatitis B Core Antibody, Total    Hepatitis B Surface Antigen    Immunoglobulins (IgG, IgA, IgM) Quantitative       Other    Prophylactic immunotherapy     Other  Visit Diagnoses         Counseling regarding goals of care          High risk medication use                 Katrin Sesay APRN, CNS    This note was generated with the assistance of ambient listening technology. Verbal consent was obtained by the patient and accompanying visitor(s) for the recording of patient appointment to facilitate this note. I attest to having reviewed and edited the generated note for accuracy, though some syntax or spelling errors may persist. Please contact the author of this note for any clarification.              [1]   Social History  Tobacco Use    Smoking status: Former     Current packs/day: 0.00     Average packs/day: 1 pack/day for 10.0 years (10.0 ttl pk-yrs)     Types: Cigarettes     Quit date: 2/18/2010     Years since quitting: 15.4    Smokeless tobacco: Never   Substance Use Topics    Alcohol use: Yes     Comment: socially    Drug use: No

## 2025-07-30 ENCOUNTER — PATIENT MESSAGE (OUTPATIENT)
Dept: PSYCHIATRY | Facility: CLINIC | Age: 50
End: 2025-07-30
Payer: COMMERCIAL

## 2025-08-01 ENCOUNTER — PATIENT MESSAGE (OUTPATIENT)
Dept: PSYCHIATRY | Facility: CLINIC | Age: 50
End: 2025-08-01
Payer: COMMERCIAL

## 2025-08-06 ENCOUNTER — PATIENT MESSAGE (OUTPATIENT)
Dept: ADMINISTRATIVE | Facility: OTHER | Age: 50
End: 2025-08-06
Payer: COMMERCIAL

## 2025-08-19 ENCOUNTER — PATIENT MESSAGE (OUTPATIENT)
Dept: ADMINISTRATIVE | Facility: HOSPITAL | Age: 50
End: 2025-08-19
Payer: COMMERCIAL